# Patient Record
Sex: MALE | Race: WHITE
[De-identification: names, ages, dates, MRNs, and addresses within clinical notes are randomized per-mention and may not be internally consistent; named-entity substitution may affect disease eponyms.]

---

## 2017-03-31 ENCOUNTER — HOSPITAL ENCOUNTER (INPATIENT)
Dept: HOSPITAL 74 - JER | Age: 62
LOS: 7 days | Discharge: NURSING, REHAB OR LONG-TERM CARE | DRG: 312 | End: 2017-04-07
Attending: NURSE PRACTITIONER | Admitting: INTERNAL MEDICINE
Payer: COMMERCIAL

## 2017-03-31 VITALS — BODY MASS INDEX: 27.2 KG/M2

## 2017-03-31 DIAGNOSIS — M48.02: ICD-10-CM

## 2017-03-31 DIAGNOSIS — N17.8: ICD-10-CM

## 2017-03-31 DIAGNOSIS — I12.9: ICD-10-CM

## 2017-03-31 DIAGNOSIS — I95.1: Primary | ICD-10-CM

## 2017-03-31 DIAGNOSIS — N18.9: ICD-10-CM

## 2017-03-31 DIAGNOSIS — Z87.891: ICD-10-CM

## 2017-03-31 DIAGNOSIS — E11.40: ICD-10-CM

## 2017-03-31 DIAGNOSIS — E78.00: ICD-10-CM

## 2017-03-31 DIAGNOSIS — E11.649: ICD-10-CM

## 2017-03-31 DIAGNOSIS — I25.118: ICD-10-CM

## 2017-03-31 DIAGNOSIS — F32.9: ICD-10-CM

## 2017-03-31 DIAGNOSIS — Z79.4: ICD-10-CM

## 2017-03-31 DIAGNOSIS — E11.22: ICD-10-CM

## 2017-03-31 DIAGNOSIS — M21.371: ICD-10-CM

## 2017-03-31 LAB
ALBUMIN SERPL-MCNC: 4 G/DL (ref 3.4–5)
ALP SERPL-CCNC: 89 U/L (ref 45–117)
ALT SERPL-CCNC: 22 U/L (ref 12–78)
ANION GAP SERPL CALC-SCNC: 13 MMOL/L (ref 8–16)
AST SERPL-CCNC: 19 U/L (ref 15–37)
BASOPHILS # BLD: 0.5 % (ref 0–2)
BILIRUB SERPL-MCNC: 0.4 MG/DL (ref 0.2–1)
CALCIUM SERPL-MCNC: 8.5 MG/DL (ref 8.5–10.1)
CO2 SERPL-SCNC: 25 MMOL/L (ref 21–32)
CREAT SERPL-MCNC: 2.9 MG/DL (ref 0.7–1.3)
DEPRECATED RDW RBC AUTO: 13.8 % (ref 11.9–15.9)
EOSINOPHIL # BLD: 2.4 % (ref 0–4.5)
GLUCOSE SERPL-MCNC: 75 MG/DL (ref 74–106)
INR BLD: 0.97 (ref 0.82–1.09)
MCH RBC QN AUTO: 31.4 PG (ref 25.7–33.7)
MCHC RBC AUTO-ENTMCNC: 32.9 G/DL (ref 32–35.9)
MCV RBC: 95.3 FL (ref 80–96)
NEUTROPHILS # BLD: 69.2 % (ref 42.8–82.8)
PLATELET # BLD AUTO: 160 K/MM3 (ref 134–434)
PMV BLD: 10.1 FL (ref 7.5–11.1)
PROT SERPL-MCNC: 8 G/DL (ref 6.4–8.2)
PT PNL PPP: 10.7 SEC (ref 9.98–11.88)
TROPONIN I SERPL-MCNC: 0.18 NG/ML (ref 0–0.05)
TROPONIN I SERPL-MCNC: < 0.02 NG/ML (ref 0–0.05)
WBC # BLD AUTO: 8.5 K/MM3 (ref 4–10)

## 2017-03-31 PROCEDURE — A9502 TC99M TETROFOSMIN: HCPCS

## 2017-03-31 RX ADMIN — INSULIN ASPART SCH UNITS: 100 INJECTION, SOLUTION INTRAVENOUS; SUBCUTANEOUS at 20:59

## 2017-03-31 NOTE — PDOC
History of Present Illness





- General


History Source: Patient, EMS


Exam Limitations: No Limitations





- History of Present Illness


Initial Comments: 





03/31/17 11:03


The patient is a 61 year old male, from [facility], with a significant 

past medical history of Type 2 diabetes(on lantus) HTN, neuropathy, cervical 

spinal stenosis, CKD (RTA4), and depression who presents to the ED, via EMS, s/

p loss of consciousness. The patient reports a blood sugar level of 94 this 

morning and was given lantus. Later the patient reports he was sitting in his 

chair waiting to get breakfast when he had a sudden onset of dizziness and loss 

consciousness. As per EMS, the patient had a blood sugar level of 30 upon their 

arrival to [facility] was urnesponsive and was diaphoretic. EMS gave 

the patient a bag of D10 and the patient became more responsive. Patient 

reports right sided chest pain, tremors and stiffness, that started en route 

to the ED. 


The patient also reports a chronic cough in the evening, but it is not worsened 

than usual. 


Denies changes in vision. Denies neck pain or back pain. Denies shortness of 

breath or palpitations. Denies abdominal pain, nausea, vomiting, or diarrhea. 

Denies fevers or chills. Denies any other symptoms.





<Saud Mallory - Last Filed: 03/31/17 13:28>





<Nathalie Rivera - Last Filed: 03/31/17 21:13>





<Anders Tam - Last Filed: 04/04/17 09:14>





- General


Chief Complaint: Blood Sugar Problem


Stated Complaint: Altered Mental Status


Time Seen by Provider: 03/31/17 10:38





Past History





<Saud Mallory - Last Filed: 03/31/17 13:28>





<Nathalie Rivera - Last Filed: 03/31/17 21:13>





- Past Medical History


Cardiac Disorders: Yes (CAD)


Diabetes: Yes


HTN: Yes


Other medical history: renal tubular acidosis





- Psycho/Social/Smoking Cessation Hx


Suicidal Ideation: No


Smoking History: Unknown if ever smoked


Hx Alcohol Use: Yes (QUIT IN 2006)


Substance Use Type: Alcohol


Hx Substance Use Treatment: No





<Anders Tam - Last Filed: 04/04/17 09:14>





- Past Medical History


Allergies/Adverse Reactions: 


 Allergies











Allergy/AdvReac Type Severity Reaction Status Date / Time


 


No Known Drug Allergies Allergy   Verified 11/16/16 09:26











Home Medications: 


Ambulatory Orders





Amlodipine Besylate [Norvasc -] 10 mg PO DAILY 11/15/16 


Calcitriol [Calcitriol -] 0.25 mcg PO DAILY 11/15/16 


Clonidine HCl 0.1 mg PO TID 11/15/16 


Furosemide [Lasix -] 40 mg PO DAILY 11/15/16 


Gabapentin [Neurontin] 300 mg PO BID 11/15/16 


Hypromellose 0.5% Opth Soln [Artificial Tears] 1 drop OU PRN PRN 11/15/16 


Insulin Glargine,Hum.rec.anlog [Lantus Solostar PEN -] 36 units SQ DAILY 11/15/

16 


Latanoprost 0.005% Eye Drops [Xalatan 0.005% Eye Drops -] 1 drop OU HS 11/15/16 


Meclizine HCl [Antivert -] 12.5 mg PO PRN PRN 11/15/16 


Multivitamins [Multivit ([facility] Formulary)] 1 tab PO DAILY 11/15/16 


Pantoprazole Sodium [Protonix] 40 mg PO DAILY 11/15/16 


Acetaminophen [Tylenol Extra Strength] 500 mg PO PRN PRN #0  11/16/16 


Ammonium Lactate Cream [Lac-Hydrin 12% Cream -] 0.01 unit TP DAILY 03/31/17 


Bacitracin - [Bacitracin Topical Ointment -] 1 applic TP DAILY 03/31/17 


Duloxetine HCl [Cymbalta] 30 mg PO DAILY 03/31/17 


Hydrocodone/Acetaminophen [Hydrocodon-Acetaminoph 7.5-325] 0 each PO Q6H MDD 4 

03/31/17 


Lactulose [Cephulac -] 30 ml PO DAILY 03/31/17 


Linagliptin [Tradjenta] 5 mg PO DAILY 03/31/17 


Sodium Polystyrene Sulfonate 15 gm PO DAILY 03/31/17 


Synalar Ts 0.01% Kit 0.01 drop TP DAILY 03/31/17 











**Review of Systems





- Review of Systems


Able to Perform ROS?: Yes


Comments:: 





03/31/17 11:03


CONSTITUTIONAL:


No reported: Fever, Chills, Diaphoresis, Generalized Weakness, Malaise, Loss of 

Appetite


HEENT:


No reported: Rhinorrhea, Nasal Congestion, Throat Pain, Throat Swelling, 

Difficulty Swallowing, Mouth Swelling, Ear Pain, Eye Pain, Visual Changes


CARDIOVASCULAR: + chest pain 


No reported: Palpitations, Irregular Heart Rate, Lightheadedness, Peripheral 

Edema


RESPIRATORY: +chronic evening cough


No reported:  Shortness of Breath, SOB with Exertion, Orthopnea, Wheezing, 

Stridor, Hemoptysis


GASTROINTESTINAL:


No reported: Abdominal pain, Abdominal Distension, Nausea, Vomiting, Diarrhea, 

Constipation, Melena, Hematochezia


GENITOURINARY:


No reported: Dysuria, Frequency, Urgency, Hesitancy, Flank Pain, Genital Pain


MUSCULOSKELETAL:


No reported: Myalgia, Arthralgia, Joint Swelling, Back pain, Neck Pain


SKIN:


No reported: Rash, Itching, Pallor


HEMEATOLOGIC/IMMUNOLOGIC:


No reported: Easy Bleeding, Easy Bruising, Lymphadenopathy, Frequent infections


ENDOCRINE:


No reported: Unexplained Weight Gain, Unexplained Weight Loss, Heat Intolerance

, Cold Intolerance


NEUROLOGIC: + loss of consciousness, tremors 


No reported: Headache, Focal Weakness, Paresthesias, Vertigo, Lightheadedness, 

Unsteady Gait, Seizure, Mental Status Changes, Incontinence


PSYCHIATRIC:


No reported: Anxiety, Depression  





All Other Systems: Reviewed and Negative





<Saud Mallory - Last Filed: 03/31/17 13:28>





*Physical Exam





- Vital Signs


 Last Vital Signs











Temp Pulse Resp BP Pulse Ox


 


 98.4 F   80   18   165/80   100 


 


 03/31/17 10:33  03/31/17 10:33  03/31/17 10:33  03/31/17 10:33  03/31/17 10:33














- Physical Exam


Comments: 





03/31/17 11:04


GENERAL: The patient is awake, alert, and fully oriented, Nontoxic - in no 

acute distress.


HEAD: Normocephalic, atraumatic.


EYES: extraocular movements intact, sclera anicteric, conjunctiva clear, pupils 

2mm bilaterally.


ENT: Normal voice,  Moist mucous membranes.


NECK: Normal range of motion, supple


LUNGS: Breath sounds equal, clear to auscultation bilaterally.  No wheezes, no 

rhonchi, no rales.


HEART: Regular rate and rhythm, normal S1 and S2 without murmur, rub or gallop.


ABDOMEN: Soft, nontender, normoactive bowel sounds.  No guarding, no rebound.  

. No CVA tenderness


EXTREMITIES: atrophy of intrinsic hand muscles R>L, R foot drop (brace in place)

,


NEUROLOGICAL: No facial assymetry, Normal speech, tremulous, 


PSYCH: Normal mood, normal affect.


SKIN: Warm, Dry, normal turgor,





<Saud Mallory - Last Filed: 03/31/17 13:28>





- Vital Signs


 Last Vital Signs











Temp Pulse Resp BP Pulse Ox


 


 97.7 F   73   19   163/89   97 


 


 03/31/17 19:49  03/31/17 19:49  03/31/17 19:49  03/31/17 19:49  03/31/17 19:49














<Nathalie Rivera - Last Filed: 03/31/17 21:13>





- Vital Signs


 Last Vital Signs











Temp Pulse Resp BP Pulse Ox


 


 98.4 F   80   18   165/80   100 


 


 03/31/17 10:33  03/31/17 10:33  03/31/17 10:33  03/31/17 10:33  03/31/17 10:33














<Anders Tam - Last Filed: 04/04/17 09:14>





**Heart Score/ECG Review





- ECG Impressions


Comment:: 


03/31/17 11:04


Twelve-lead EKG was performed and reviewed by me. 


There is normal sinus rhythm with a normal rate. 


Rate of 75


The axis is normal. 


Q waves in lead 3


T wave inversion in aVL











03/31/17 16:49


Twelve-lead EKG was performed and reviewed by me. 


There is normal sinus rhythm with a normal rate. 


Rate of 75


The axis is normal. 


T wave inversion in aVL


No significant changes when compared with prior EKG





<Anders Tam - Last Filed: 04/04/17 09:14>





ED Treatment Course





- LABORATORY


CBC & Chemistry Diagram: 


 03/31/17 10:43





 03/31/17 10:43





- ADDITIONAL ORDERS


Additional order review: 


 











  03/31/17





  10:43


 


RBC  4.66


 


MCV  95.3


 


MCHC  32.9


 


RDW  13.8


 


MPV  10.1


 


Neutrophils %  69.2


 


Lymphocytes %  20.7


 


Monocytes %  7.2


 


Eosinophils %  2.4


 


Basophils %  0.5














- RADIOLOGY


Radiograph Interpretation: 





03/31/17 11:09


RAD/CHEST X-RAY PORTABLE


Impression: No evidence of active pulmonary disease.


Reported by: Martín Clifford 





<Saud Mallory - Last Filed: 03/31/17 13:28>





- LABORATORY


CBC & Chemistry Diagram: 


 03/31/17 10:43





 03/31/17 10:43





- ADDITIONAL ORDERS


Additional order review: 


 Laboratory  Results











  03/31/17 03/31/17 03/31/17





  20:21 17:00 12:19


 


INR   


 


Sodium   


 


Potassium   


 


Chloride   


 


Carbon Dioxide   


 


Anion Gap   


 


BUN   


 


Creatinine   


 


Creat Clearance w eGFR   


 


POC Glucometer  [phone]   235.03701


 


Random Glucose   


 


Calcium   


 


Magnesium   


 


Total Bilirubin   


 


AST   


 


ALT   


 


Alkaline Phosphatase   


 


Creatine Kinase   140 


 


Creatine Kinase Index   


 


CK-MB (CK-2)   


 


CK-MB (CK-2) Rel Index   


 


Troponin I   0.18 H D 


 


Total Protein   


 


Albumin   














  03/31/17 03/31/17 03/31/17





  10:43 10:43 10:43


 


INR    0.97


 


Sodium   


 


Potassium   


 


Chloride   


 


Carbon Dioxide   


 


Anion Gap   


 


BUN   


 


Creatinine   


 


Creat Clearance w eGFR   


 


POC Glucometer   


 


Random Glucose   


 


Calcium   


 


Magnesium   2.2 


 


Total Bilirubin   


 


AST   


 


ALT   


 


Alkaline Phosphatase   


 


Creatine Kinase   


 


Creatine Kinase Index   


 


CK-MB (CK-2)   


 


CK-MB (CK-2) Rel Index  Cancelled  


 


Troponin I   


 


Total Protein   


 


Albumin   














  03/31/17





  10:43


 


INR 


 


Sodium  141


 


Potassium  3.4 L


 


Chloride  103


 


Carbon Dioxide  25


 


Anion Gap  13


 


BUN  31 H


 


Creatinine  2.9 H


 


Creat Clearance w eGFR  22.31


 


POC Glucometer 


 


Random Glucose  75


 


Calcium  8.5


 


Magnesium 


 


Total Bilirubin  0.4


 


AST  19


 


ALT  22


 


Alkaline Phosphatase  89


 


Creatine Kinase  151


 


Creatine Kinase Index  1.4


 


CK-MB (CK-2)  2.264


 


CK-MB (CK-2) Rel Index 


 


Troponin I  < 0.02


 


Total Protein  8.0


 


Albumin  4.0








 











  03/31/17 03/31/17 03/31/17





  20:21 12:19 10:43


 


RBC    4.66


 


MCV    95.3


 


MCHC    32.9


 


RDW    13.8


 


MPV    10.1


 


Neutrophils %    69.2


 


Lymphocytes %    20.7


 


Monocytes %    7.2


 


Eosinophils %    2.4


 


Basophils %    0.5


 


POC Glucometer  350.06893  235.30007 














- Medications


Given in the ED: 


ED Medications














Discontinued Medications














Generic Name Dose Route Start Last Admin





  Trade Name Freq  PRN Reason Stop Dose Admin


 


Aspirin  162 mg 03/31/17 18:14 03/31/17 18:27





  Asa -  PO 03/31/17 18:15  162 mg





  ONCE ONE   Administration














<Nathalie Rivera - Last Filed: 03/31/17 21:13>





- LABORATORY


CBC & Chemistry Diagram: 


 04/02/17 06:02





 04/04/17 05:35





- RADIOLOGY


Radiology Studies Ordered: 














 Category Date Time Status


 


 CHEST X-RAY PORTABLE* [RAD] Stat Radiology  03/31/17 10:44 Ordered














<Anders Tam - Last Filed: 04/04/17 09:14>





Medical Decision Making





- Medical Decision Making





03/31/17 13:28


Case discussed with Leda Shane at 12:40.





<Saud Mallory - Last Filed: 03/31/17 13:28>





- Medical Decision Making


03/31/17 10:47


61y M hx of spinal stenosis, dm2, depression, glaucoma, RTA4, presents with 

episode of nonresponsiveness. Per EMS and patient report, the patient was found 

unresponsive this morning, upon EMS arrival, his BGM was 30 and he was 

diaphoretic and responded to D10. The patient was noted to be mildly hypoxic to 

93% on RA and he does complain of a chornic night cough but isnt really worse 

than usual, the pt did have some R sided chest pain that started enrout to the 

hospital.  On exam the pt is alert/oriented, had some chronic atrophy of his 

intrinsic hand muscles. Exam otherwise unremarakble.





differential for the patients ams - likely hypoglycemia, unclear trigger -?no 

infectious complaints - but will send a UA, will ck trops, ekg, labs, cxr


will monitor bgm














03/31/17 12:43


The pts blood work is notable for cr of 2.9 - 


ua/tox pending


pts bgm is holding up





case dw dr. Burns (PMD) states pt has ckd and his cr is in the 2-3 range


will obtain 2nd trop, and repeat bgm - if normal, will d/c without patient 

management











03/31/17 18:13


second 2kg unchanged


trop trending higher, at 0.18


will admit to obs for further management


currently cp free





<Anders Tam - Last Filed: 04/04/17 09:14>





*DC/Admit/Observation/Transfer





- Attestations


Scribe Attestion: 





03/31/17 11:04





Documentation prepared by Saud aMllory, acting as medical scribe for Anders Tam MD





<Saud Mallory - Last Filed: 03/31/17 13:28>





- Discharge Dispostion


Admit: Yes





<Nathalie Rivera - Last Filed: 03/31/17 21:13>





<Anders Tam - Last Filed: 04/04/17 09:14>


Diagnosis at time of Disposition: 


 Chest pain, CKD (chronic kidney disease)





Diabetes mellitus


Qualifiers:


 Diabetes mellitus type: type 2 Diabetes mellitus complication status: without 

complication Diabetes mellitus long term insulin use: without long term use 

Qualified Code(s): E11.9 - Type 2 diabetes mellitus without complications





- Referrals

## 2017-04-01 LAB
ANION GAP SERPL CALC-SCNC: 11 MMOL/L (ref 8–16)
APPEARANCE UR: CLEAR
BASOPHILS # BLD: 0.4 % (ref 0–2)
BILIRUB UR STRIP.AUTO-MCNC: NEGATIVE MG/DL
CALCIUM SERPL-MCNC: 8.3 MG/DL (ref 8.5–10.1)
CHOLEST SERPL-MCNC: 127 MG/DL (ref 50–200)
CO2 SERPL-SCNC: 23 MMOL/L (ref 21–32)
COLOR UR: (no result)
CREAT SERPL-MCNC: 2.4 MG/DL (ref 0.7–1.3)
DEPRECATED RDW RBC AUTO: 13.7 % (ref 11.9–15.9)
EOSINOPHIL # BLD: 3 % (ref 0–4.5)
GLUCOSE SERPL-MCNC: 149 MG/DL (ref 74–106)
KETONES UR QL STRIP: NEGATIVE
LDLC SERPL CALC-MCNC: 69 MG/DL (ref 5–100)
LEUKOCYTE ESTERASE UR QL STRIP.AUTO: NEGATIVE
MAGNESIUM SERPL-MCNC: 2 MG/DL (ref 1.8–2.4)
MCH RBC QN AUTO: 32 PG (ref 25.7–33.7)
MCHC RBC AUTO-ENTMCNC: 33.6 G/DL (ref 32–35.9)
MCV RBC: 95.3 FL (ref 80–96)
NEUTROPHILS # BLD: 69.3 % (ref 42.8–82.8)
NITRITE UR QL STRIP: NEGATIVE
PH UR: 6 [PH] (ref 5–8)
PHOSPHATE SERPL-MCNC: 2.5 MG/DL (ref 2.5–4.9)
PLATELET # BLD AUTO: 147 K/MM3 (ref 134–434)
PMV BLD: 10.1 FL (ref 7.5–11.1)
PROT UR QL STRIP: (no result)
PROT UR QL STRIP: NEGATIVE
RBC # UR STRIP: NEGATIVE /UL
SP GR UR: 1 (ref 1–1.03)
TROPONIN I SERPL-MCNC: 0.13 NG/ML (ref 0–0.05)
UROBILINOGEN UR STRIP-MCNC: NEGATIVE E.U./DL (ref 0.2–1)
WBC # BLD AUTO: 7.8 K/MM3 (ref 4–10)

## 2017-04-01 RX ADMIN — PANTOPRAZOLE SODIUM SCH MG: 40 TABLET, DELAYED RELEASE ORAL at 09:03

## 2017-04-01 RX ADMIN — INSULIN ASPART SCH UNITS: 100 INJECTION, SOLUTION INTRAVENOUS; SUBCUTANEOUS at 11:28

## 2017-04-01 RX ADMIN — INSULIN ASPART SCH UNITS: 100 INJECTION, SOLUTION INTRAVENOUS; SUBCUTANEOUS at 16:32

## 2017-04-01 RX ADMIN — INSULIN ASPART SCH: 100 INJECTION, SOLUTION INTRAVENOUS; SUBCUTANEOUS at 06:32

## 2017-04-01 RX ADMIN — AMLODIPINE BESYLATE SCH MG: 10 TABLET ORAL at 09:04

## 2017-04-01 RX ADMIN — LATANOPROST SCH DRP: 50 SOLUTION OPHTHALMIC at 22:11

## 2017-04-01 RX ADMIN — CALCITRIOL SCH MCG: 0.25 CAPSULE ORAL at 09:05

## 2017-04-01 RX ADMIN — CARVEDILOL SCH MG: 6.25 TABLET, FILM COATED ORAL at 11:30

## 2017-04-01 RX ADMIN — CARVEDILOL SCH MG: 6.25 TABLET, FILM COATED ORAL at 22:11

## 2017-04-01 RX ADMIN — HEPARIN SODIUM SCH UNIT: 5000 INJECTION, SOLUTION INTRAVENOUS; SUBCUTANEOUS at 22:11

## 2017-04-01 RX ADMIN — GABAPENTIN SCH MG: 300 CAPSULE ORAL at 22:11

## 2017-04-01 RX ADMIN — INSULIN ASPART SCH UNITS: 100 INJECTION, SOLUTION INTRAVENOUS; SUBCUTANEOUS at 22:11

## 2017-04-01 RX ADMIN — DULOXETINE SCH MG: 30 CAPSULE, DELAYED RELEASE ORAL at 09:04

## 2017-04-01 RX ADMIN — FUROSEMIDE SCH MG: 40 TABLET ORAL at 09:04

## 2017-04-01 RX ADMIN — HEPARIN SODIUM SCH UNIT: 5000 INJECTION, SOLUTION INTRAVENOUS; SUBCUTANEOUS at 09:04

## 2017-04-01 RX ADMIN — ASPIRIN 81 MG SCH MG: 81 TABLET ORAL at 09:04

## 2017-04-01 RX ADMIN — BACITRACIN ZINC SCH APPLIC: 500 OINTMENT TOPICAL at 14:20

## 2017-04-01 RX ADMIN — GABAPENTIN SCH MG: 300 CAPSULE ORAL at 09:03

## 2017-04-01 RX ADMIN — MULTIVITAMIN TABLET SCH TAB: TABLET at 09:02

## 2017-04-01 NOTE — CONS
DATE OF CONSULTATION:  04/01/2017

 

Consultation requested by hospitalist.

 

CHIEF COMPLAINT:  Evaluation of chest discomfort, elevated troponin I.

 

History was obtained from the patient.  A 61-year-old male of  descent, with

known history of probable diastolic left ventricular dysfunction, with class 0 [place]

[place] Heart Association classification left ventricular failure, hypertensive

cardiovascular disease, insulin-dependent diabetes mellitus, hypercholesterolemia,

cervical spinal stenosis, chronic kidney disease, peripheral neuropathy, right foot

drop, who presented to [facility] with unresponsiveness, and he

was noted to have hypoglycemia.  The patient in addition was noted to have elevated

troponin I.  The patient has been complaining of left-sided chest wall discomfort,

which has been noted for the last several days, exacerbated by cough or deep

inspiration.  The patient denied any dyspnea, orthopnea, paroxysmal nocturnal

dyspnea, or peripheral edema.  The patient denied any palpitations, dizziness,

lightheadedness.  The patient reports fatigue and tiredness.

 

PAST MEDICAL HISTORY:  Probable diastolic left ventricular dysfunction, with class 0

[place] Heart Association classification left ventricular failure; hypertensive

cardiovascular disease; diabetes mellitus; hypercholesterolemia; cervical spinal

stenosis; chronic kidney disease; peripheral neuropathy.

 

PAST SURGICAL HISTORY:  Right heel surgery for management of an abscess.

 

SOCIAL HISTORY:  Prior history of tobacco abuse.

 

FAMILY HISTORY:  Positive coronary artery disease.

 

ALLERGIES:  No known medical allergies.

 

MEDICAL THERAPY:  At home included Norvasc 10 mg once a day, calcitriol of 0.25 mcg

once a day, clonidine 0.1 mg 3 times a day, Lasix 40 mg once a day, Neurontin 300 mg

twice a day, insulin as prescribed, meclizine 12.5-mg tablet as needed for benign

positional vertigo, multivitamin 1 tablet once a day, Protonix 40 mg once a day,

Cymbalta 30 mg once a day, hydrocodone/acetaminophen 7.5/325 mg-tablet 1 tablet every

4 hours as needed, Tradjenta 5 mg once a day.

 

REVIEW OF SYSTEMS:

Head and neck:  Denies headache, photophobia, blurring of vision.

Respiratory:  No cough or sputum production.

Cardiovascular:  As noted above.

Gastrointestinal:  Denies nausea, vomiting, diarrhea, abdominal discomfort.

Genitourinary:  No symptoms reported.

Musculoskeletal:  Chest wall discomfort, as noted above.

 

PHYSICAL EXAMINATION:

Vital signs:  Blood pressure is 144/68 mmHg; pulse rate is 70 beats per minute.

Head and neck:  Pupils equal and react to light and accommodation.  Extraocular

muscles are intact.  Anicteric sclerae.  Negative JVD.  No bruit appreciated.

Chest:  Clear to auscultation and percussion.

Cardiovascular:  S1, S2 regular.  No murmur, clicks, or gallops.

Abdomen:  Soft.  Benign.  Normoactive bowel sounds.

Extremities:  Diminished distal pulses.  Bilateral femoral pulses 1+ to 2+.  No calf

tenderness.

 

Electrocardiogram reveals sinus rhythm, with nonspecific T-wave abnormality, and

nondiagnostic inferior Q wave.

 

CBC reveals a white cell count of 7.8, hemoglobin 13.5, platelet count 147.  Basic

metabolic profile reveals sodium 141, potassium 3.4, BUN of 31, creatinine 2.9,

glucose 75.  Troponin initially less than 0.02; repeat 0.18, 0.21 and 0.13.

 

ASSESSMENT:

1.  Chest pain syndrome, coronary artery disease, angina pectoris, with borderline

troponin I elevation.  Not indicative of acute coronary syndrome, clinically, since

there are no electrocardiographic changes, and the above-noted presentation is

atypical.

2.  Diastolic left ventricular dysfunction, with class 0 to 1 [place] Heart

Association classification left ventricular failure.

3.  Insulin-dependent diabetes mellitus.

4.  Hypertensive cardiovascular disease.

5.  Hypercholesterolemia.

6.  Cervical spinal stenosis.

7.  Chronic kidney disease.

8.  History of peripheral neuropathy.

 

RECOMMENDATIONS:

1.  Addition of beta-blockers, Coreg.

2.  Continuation of Norvasc therapy.

3.  Ideally, the patient should be on an ACE inhibitor or angiotensin-receptor

blocker therapy, unless it is absolutely contraindicated, pending renal function

stabilization.

4.  Continuation of clonidine therapy.

5.  Continuation of Lasix therapy.

6.  Continuation of aspirin therapy.

7.  Echocardiography for evaluation of left ventricular size and function.

8.  Pharmacologic myocardial perfusion imaging study for evaluation of coronary

artery disease ischemic defect severity.

 

Thank you for the kind referral.

 

 

CONNIE DOMINGUEZ M.D.

 

[id number]

DD: 04/01/2017 11:04

DT: 04/01/2017 13:57

Job #:  [id number]

## 2017-04-01 NOTE — EKG
Test Reason : 

Blood Pressure : ***/*** mmHG

Vent. Rate : 066 BPM     Atrial Rate : 066 BPM

   P-R Int : 176 ms          QRS Dur : 086 ms

    QT Int : 414 ms       P-R-T Axes : 036 027 098 degrees

   QTc Int : 434 ms

 

NORMAL SINUS RHYTHM

POSSIBLE INFERIOR INFARCT , AGE UNDETERMINED

ABNORMAL ECG

WHEN COMPARED WITH ECG OF 31-MAR-2017 16:41,

BORDERLINE CRITERIA FOR INFERIOR INFARCT ARE NOW PRESENT

Confirmed by SABRINA FARNSWORTH MD ([id number]) on 4/1/2017 12:14:05 PM

 

Referred By:             Confirmed By:SABRINA FARNSWORTH MD

## 2017-04-01 NOTE — EKG
Test Reason : 

Blood Pressure : ***/*** mmHG

Vent. Rate : 075 BPM     Atrial Rate : 075 BPM

   P-R Int : 174 ms          QRS Dur : 086 ms

    QT Int : 394 ms       P-R-T Axes : 040 -06 091 degrees

   QTc Int : 439 ms

 

NORMAL SINUS RHYTHM WITH SINUS ARRHYTHMIA

ABNORMAL QRS-T ANGLE, CONSIDER PRIMARY T WAVE ABNORMALITY

ABNORMAL ECG

WHEN COMPARED WITH ECG OF 31-MAR-2017 10:50,

CRITERIA FOR INFERIOR INFARCT ARE NO LONGER PRESENT

Confirmed by SABRINA FARNSWORTH MD ([id number]) on 4/1/2017 12:20:46 PM

 

Referred By:             Confirmed By:SABRINA FARNSWORTH MD

## 2017-04-01 NOTE — PN
Progress Note (short form)





- Note


Progress Note: 


Chief Complaint: Events noted, notes reviewed, Complaining of left sided chest 

pain, intermittent, denies any dyspnea 





History of Present Illness: 


Seen and examined on telemetry. Full consult 





- Current Medication List





 Current Medications





Acetaminophen (Tylenol -)  500 mg PO Q6H PRN


   PRN Reason: PAIN


Amlodipine Besylate (Norvasc -)  10 mg PO DAILY [facility]


   Last Admin: 04/01/17 09:04 Dose:  10 mg


Aspirin (Asa -)  81 mg PO DAILY [facility]


   Last Admin: 04/01/17 09:04 Dose:  81 mg


Bacitracin (Bacitracin -)  1 applic TP DAILY [facility]


Calcitriol (Rocaltrol -)  0.25 mcg PO DAILY [facility]


   Last Admin: 04/01/17 09:05 Dose:  0.25 mcg


Clonidine (Catapres -)  0.1 mg PO TID [facility]


   Last Admin: 04/01/17 06:09 Dose:  0.1 mg


Duloxetine HCl (Cymbalta -)  30 mg PO DAILY [facility]


   Last Admin: 04/01/17 09:04 Dose:  30 mg


Furosemide (Lasix -)  40 mg PO DAILY [facility]


   Last Admin: 04/01/17 09:04 Dose:  40 mg


Gabapentin (Neurontin -)  300 mg PO BID [facility]


   Last Admin: 04/01/17 09:03 Dose:  300 mg


Heparin Sodium (Porcine) (Heparin -)  5,000 unit SQ BID [facility]


   Last Admin: 04/01/17 09:04 Dose:  5,000 unit


Insulin Aspart (Novolog Vial Sliding Scale -)  0 vial SQ ACHS [facility]


   PRN Reason: Protocol


   Last Admin: 04/01/17 06:32 Dose:  Not Given


Latanoprost (Xalatan 0.005% Eye Drops -)  1 drop OU HS [facility]


Multivitamins/Minerals/Vitamin C (Tab-A-Vit -)  1 tab PO DAILY [facility]


   Last Admin: 04/01/17 09:02 Dose:  1 tab


Pantoprazole Sodium (Protonix -)  40 mg PO DAILY [facility]


   Last Admin: 04/01/17 09:03 Dose:  40 mg





- Review of Systems


Cardiovascular: As noted above


Respiratory: denies: Cough or Sputum Production


Gastrointestinal: denies: Nausea, Vomiting, Diarrhea, Constipation or Abdominal 

Pain  


Musculoskeletal:  No symptoms reported 


Neurological: Foot drop and spinal stenosis 





- Objective


Vital Signs: 





 Last Vital Signs











Temp Pulse Resp BP Pulse Ox


 


 98 F   70   16   144/68   98 


 


 04/01/17 09:30  04/01/17 09:30  04/01/17 09:30  04/01/17 09:30  03/31/17 23:35








Neck: Supple Negative JVD 


Cardiovascular: S1 S2 Regular Rate Rhythm 


Respiratory: Clear to A&P


Gastrointestinal: Soft Benign Normal Bowel Sounds


Ext: Negative Edema





Labs: 





 CBC, BMP





 04/01/17 06:07 





 03/31/17 10:43 





 Hepatic Panel











Total Bilirubin  0.4 mg/dL (0.2-1.0)   03/31/17  10:43    


 


AST  19 U/L (15-37)   03/31/17  10:43    


 


ALT  22 U/L (12-78)   03/31/17  10:43    


 


Alkaline Phosphatase  89 U/L ()   03/31/17  10:43    


 


Albumin  4.0 g/dl (3.4-5.0)   03/31/17  10:43    








 Troponin, BNP











  03/31/17 03/31/17 04/01/17





  10:43 17:00 00:01


 


Troponin I  < 0.02  0.18 H D  0.21 H














  04/01/17 04/01/17





  06:07 06:07


 


Troponin I  0.13 H D  Cancelled











Assessment/Plan





ASSESSMENT:





1. Chest pain syndrome, CAD angina pectoris with borderline Troponin I 

elevation not indicative of ACS clinically, no EKG acute  changes


2. Diastolic LV dysfunction with class 0-I NYHA classification LV failure


3. IDDM


4. HTN


5. Hypercholesterolemia


6. Cervical Spinal Stenosis


7. CKD 


8. Neuropathy 





PLAN:





1. Add B-Blockers, Coreg


2. Continue Norvasc and Clonidine


3. Ideally should be on ACEI or ARBS unless contraindicated, pending renal 

function stabilization


4. Continue PO Lasix


5. Continue ASA


6. Echocardiography for evaluation of LV function


7. Pharmacologic MPI study to evaluate CAD/ischemia severity 














Debibe Bartholomew MD

## 2017-04-01 NOTE — PN
Progress Note (short form)





- Note


Progress Note: 


Subjective: The patient was seen at the bedside, he states he doesn't want to 

talk and asked me to leave the room. 





 Current Medications











Generic Name Dose Route Start Last Admin





  Trade Name Nixon  PRN Reason Stop Dose Admin


 


Acetaminophen  500 mg 04/01/17 00:50  





  Tylenol -  PO   





  Q6H PRN   





  PAIN   


 


Amlodipine Besylate  10 mg 04/01/17 10:00 04/01/17 09:04





  Norvasc -  PO   10 mg





  DAILY VICKIE   Administration


 


Aspirin  81 mg 04/01/17 10:00 04/01/17 09:04





  Asa -  PO   81 mg





  DAILY VICKIE   Administration


 


Bacitracin  1 applic 04/01/17 10:00  





  Bacitracin -  TP   





  DAILY VICKIE   


 


Calcitriol  0.25 mcg 04/01/17 10:00 04/01/17 09:05





  Rocaltrol -  PO   0.25 mcg





  DAILY VICKIE   Administration


 


Carvedilol  6.25 mg 04/01/17 11:15 04/01/17 11:30





  Coreg -  PO   6.25 mg





  BID VICKIE   Administration


 


Clonidine  0.1 mg 04/01/17 22:00  





  Catapres -  PO   





  BID VICKIE   


 


Duloxetine HCl  30 mg 04/01/17 10:00 04/01/17 09:04





  Cymbalta -  PO   30 mg





  DAILY VICKIE   Administration


 


Furosemide  40 mg 04/01/17 10:00 04/01/17 09:04





  Lasix -  PO   40 mg





  DAILY VICKIE   Administration


 


Gabapentin  300 mg 04/01/17 10:00 04/01/17 09:03





  Neurontin -  PO   300 mg





  BID VICKIE   Administration


 


Heparin Sodium (Porcine)  5,000 unit 04/01/17 10:00 04/01/17 09:04





  Heparin -  SQ   5,000 unit





  BID VICKIE   Administration


 


Insulin Aspart  0 vial 03/31/17 22:00 04/01/17 11:28





  Novolog Vial Sliding Scale -  SQ   4 units





  [facility] VICKIE   Administration





  Protocol   


 


Latanoprost  1 drop 04/01/17 22:00  





  Xalatan 0.005% Eye Drops -  OU   





  HS VICKIE   


 


Multivitamins/Minerals/Vitamin C  1 tab 04/01/17 10:00 04/01/17 09:02





  Tab-A-Vit -  PO   1 tab





  DAILY VICKIE   Administration


 


Pantoprazole Sodium  40 mg 04/01/17 10:00 04/01/17 09:03





  Protonix -  PO   40 mg





  DAILY VICKIE   Administration








Objective: 


 Vital Signs











 Period  Temp  Pulse  Resp  BP Sys/Bellamy  Pulse Ox


 


 Last 24 Hr  97.7 F-98.3 F  70-78  16-19  137-163/62-89  96-98








Physical Exam: 


Patient refused





 CBCD











WBC  7.8 K/mm3 (4.0-10.0)   04/01/17  06:07    


 


RBC  4.21 M/mm3 (4.00-5.60)   04/01/17  06:07    


 


Hgb  13.5 GM/dL (11.7-16.9)   04/01/17  06:07    


 


Hct  40.1 % (35.4-49)   04/01/17  06:07    


 


MCV  95.3 fl (80-96)   04/01/17  06:07    


 


MCHC  33.6 g/dl (32.0-35.9)   04/01/17  06:07    


 


RDW  13.7 % (11.9-15.9)   04/01/17  06:07    


 


Plt Count  147 K/MM3 (134-434)   04/01/17  06:07    


 


MPV  10.1 fl (7.5-11.1)   04/01/17  06:07    








 CMP











Sodium  143 mmol/L (136-145)   04/01/17  06:07    


 


Potassium  4.2 mmol/L (3.5-5.1)  D 04/01/17  06:07    


 


Chloride  109 mmol/L ()  H  04/01/17  06:07    


 


Carbon Dioxide  23 mmol/L (21-32)   04/01/17  06:07    


 


Anion Gap  11  (8-16)   04/01/17  06:07    


 


BUN  28 mg/dL (7-18)  H  04/01/17  06:07    


 


Creatinine  2.4 mg/dL (0.7-1.3)  H  04/01/17  06:07    


 


Creat Clearance w eGFR  22.31  (>60)   03/31/17  10:43    


 


Random Glucose  149 mg/dL ()  H D 04/01/17  06:07    


 


Calcium  8.3 mg/dL (8.5-10.1)  L  04/01/17  06:07    


 


Total Bilirubin  0.4 mg/dL (0.2-1.0)   03/31/17  10:43    


 


AST  19 U/L (15-37)   03/31/17  10:43    


 


ALT  22 U/L (12-78)   03/31/17  10:43    


 


Alkaline Phosphatase  89 U/L ()   03/31/17  10:43    


 


Total Protein  8.0 g/dl (6.4-8.2)   03/31/17  10:43    


 


Albumin  4.0 g/dl (3.4-5.0)   03/31/17  10:43    








 CARDIAC ENZYMES











Creatine Kinase  156 IU/L ()   04/01/17  06:07    


 


Troponin I  0.13 ng/ml (0.00-0.05)  H D 04/01/17  06:07    








Assessment: This is a 61 year old male with PMHx of IDDM, HTN, CKD (RTA4), 

cervical spinal stenosis, neuropathy, R- Foot Drop, atrophy, depression who 

presented to the ED after being found unresponsive with a BGM of 30 (responded 

to D10). 





Plan:


1) Cardiology: Chest pain


- Trops elevated, however trending down. Per cardiology, not indicative of ACS 

clinically. No EKG acute changes


- F/u ECHO


- F/u pharmacologic MPI


Diastolic LV dysfunction


- Continue Carvedilol


- Continue Lasix


- Continue Norvasc


- Continue ASA


- Appreciate cardiology consult


HTN


- As above





2) : CKD


- Baseline Cr 2.5-2.6


- Cr today 2.4


- Continue to monitor





3) Endocrine: IDDM


- Period of hypoglycemia requiring D10 prior to ED arrival


- BGM ACHS


- ISS ACHS


- Patient takes Glargine 36u sq daily at home, will hold for now and monitor 

novolog requirements





4) F/E/N:


- Monitor electrolytes


- Sodium controlled diet





5) Prophylaxis: 


- OOB ambulating


- Heparin 5,000u sq bid





6) Dispo:


- Requires continued inpatient care





CODE STATUS: FULL CODE





Visit type





- Emergency Visit


Emergency Visit: Yes


ED Registration Date: 03/31/17


Care time: The patient presented to the Emergency Department on the above date 

and was hospitalized for further evaluation of their emergent condition.





- New Patient


This patient is new to me today: Yes


Date on this admission: 04/01/17





- Critical Care


Critical Care patient: No

## 2017-04-02 LAB
ALBUMIN SERPL-MCNC: 3.5 G/DL (ref 3.4–5)
ALP SERPL-CCNC: 80 U/L (ref 45–117)
ALT SERPL-CCNC: 19 U/L (ref 12–78)
ANION GAP SERPL CALC-SCNC: 9 MMOL/L (ref 8–16)
AST SERPL-CCNC: 17 U/L (ref 15–37)
BASOPHILS # BLD: 0.4 % (ref 0–2)
BILIRUB SERPL-MCNC: 0.6 MG/DL (ref 0.2–1)
CALCIUM SERPL-MCNC: 8.7 MG/DL (ref 8.5–10.1)
CO2 SERPL-SCNC: 28 MMOL/L (ref 21–32)
CREAT SERPL-MCNC: 2.5 MG/DL (ref 0.7–1.3)
DEPRECATED RDW RBC AUTO: 13.7 % (ref 11.9–15.9)
EOSINOPHIL # BLD: 5.7 % (ref 0–4.5)
GLUCOSE SERPL-MCNC: 215 MG/DL (ref 74–106)
MCH RBC QN AUTO: 31.6 PG (ref 25.7–33.7)
MCHC RBC AUTO-ENTMCNC: 33 G/DL (ref 32–35.9)
MCV RBC: 95.6 FL (ref 80–96)
NEUTROPHILS # BLD: 58.7 % (ref 42.8–82.8)
PLATELET # BLD AUTO: 145 K/MM3 (ref 134–434)
PMV BLD: 10.4 FL (ref 7.5–11.1)
PROT SERPL-MCNC: 7.1 G/DL (ref 6.4–8.2)
WBC # BLD AUTO: 5.6 K/MM3 (ref 4–10)

## 2017-04-02 RX ADMIN — HEPARIN SODIUM SCH UNIT: 5000 INJECTION, SOLUTION INTRAVENOUS; SUBCUTANEOUS at 22:30

## 2017-04-02 RX ADMIN — DULOXETINE SCH MG: 30 CAPSULE, DELAYED RELEASE ORAL at 11:38

## 2017-04-02 RX ADMIN — INSULIN ASPART SCH UNITS: 100 INJECTION, SOLUTION INTRAVENOUS; SUBCUTANEOUS at 17:36

## 2017-04-02 RX ADMIN — GABAPENTIN SCH MG: 300 CAPSULE ORAL at 12:10

## 2017-04-02 RX ADMIN — MULTIVITAMIN TABLET SCH TAB: TABLET at 11:39

## 2017-04-02 RX ADMIN — INSULIN ASPART SCH UNITS: 100 INJECTION, SOLUTION INTRAVENOUS; SUBCUTANEOUS at 22:31

## 2017-04-02 RX ADMIN — INSULIN ASPART SCH UNITS: 100 INJECTION, SOLUTION INTRAVENOUS; SUBCUTANEOUS at 06:06

## 2017-04-02 RX ADMIN — FUROSEMIDE SCH MG: 40 TABLET ORAL at 11:40

## 2017-04-02 RX ADMIN — INSULIN ASPART SCH UNITS: 100 INJECTION, SOLUTION INTRAVENOUS; SUBCUTANEOUS at 12:11

## 2017-04-02 RX ADMIN — PANTOPRAZOLE SODIUM SCH MG: 40 TABLET, DELAYED RELEASE ORAL at 11:42

## 2017-04-02 RX ADMIN — HEPARIN SODIUM SCH UNIT: 5000 INJECTION, SOLUTION INTRAVENOUS; SUBCUTANEOUS at 11:42

## 2017-04-02 RX ADMIN — BACITRACIN ZINC SCH APPLIC: 500 OINTMENT TOPICAL at 12:40

## 2017-04-02 RX ADMIN — ASPIRIN 81 MG SCH MG: 81 TABLET ORAL at 11:40

## 2017-04-02 RX ADMIN — CALCITRIOL SCH MCG: 0.25 CAPSULE ORAL at 11:40

## 2017-04-02 RX ADMIN — LATANOPROST SCH DRP: 50 SOLUTION OPHTHALMIC at 22:31

## 2017-04-02 RX ADMIN — AMLODIPINE BESYLATE SCH MG: 10 TABLET ORAL at 11:38

## 2017-04-02 RX ADMIN — CARVEDILOL SCH MG: 12.5 TABLET, FILM COATED ORAL at 22:30

## 2017-04-02 RX ADMIN — CARVEDILOL SCH MG: 12.5 TABLET, FILM COATED ORAL at 11:41

## 2017-04-02 RX ADMIN — GABAPENTIN SCH MG: 300 CAPSULE ORAL at 22:30

## 2017-04-02 NOTE — PN
Progress Note (short form)





- Note


Progress Note: 


Chief Complaint: Events noted, notes reviewed, continues to report vague left 

sided chest pain, intermittent, denies any dyspnea 





History of Present Illness: 


Seen and examined on telemetry. Events noted, notes reviewed, continues to 

report vague left sided chest pain, intermittent, denies any dyspnea 





Plan to proceed with echocardiography and MPI study in AM





- Current Medication List





 Current Medications





Acetaminophen (Tylenol -)  500 mg PO Q6H PRN


   PRN Reason: PAIN


Amlodipine Besylate (Norvasc -)  10 mg PO DAILY [facility]


   Last Admin: 04/01/17 09:04 Dose:  10 mg


Aspirin (Asa -)  81 mg PO DAILY [facility]


   Last Admin: 04/01/17 09:04 Dose:  81 mg


Bacitracin (Bacitracin -)  1 applic TP DAILY [facility]


   Last Admin: 04/01/17 14:20 Dose:  1 applic


Calcitriol (Rocaltrol -)  0.25 mcg PO DAILY [facility]


   Last Admin: 04/01/17 09:05 Dose:  0.25 mcg


Carvedilol (Coreg -)  6.25 mg PO BID [facility]


   Last Admin: 04/01/17 22:11 Dose:  6.25 mg


Clonidine (Catapres -)  0.1 mg PO BID [facility]


   Last Admin: 04/01/17 22:11 Dose:  0.1 mg


Duloxetine HCl (Cymbalta -)  30 mg PO DAILY [facility]


   Last Admin: 04/01/17 09:04 Dose:  30 mg


Furosemide (Lasix -)  40 mg PO DAILY [facility]


   Last Admin: 04/01/17 09:04 Dose:  40 mg


Gabapentin (Neurontin -)  300 mg PO BID [facility]


   Last Admin: 04/01/17 22:11 Dose:  300 mg


Heparin Sodium (Porcine) (Heparin -)  5,000 unit SQ BID [facility]


   Last Admin: 04/01/17 22:11 Dose:  5,000 unit


Insulin Aspart (Novolog Vial Sliding Scale -)  0 vial SQ ACHS [facility]


   PRN Reason: Protocol


   Last Admin: 04/02/17 06:06 Dose:  4 units


Latanoprost (Xalatan 0.005% Eye Drops -)  1 drop OU HS [facility]


   Last Admin: 04/01/17 22:11 Dose:  1 drp


Multivitamins/Minerals/Vitamin C (Tab-A-Vit -)  1 tab PO DAILY [facility]


   Last Admin: 04/01/17 09:02 Dose:  1 tab


Pantoprazole Sodium (Protonix -)  40 mg PO DAILY [facility]


   Last Admin: 04/01/17 09:03 Dose:  40 mg





- Review of Systems


Cardiovascular: As noted above


Respiratory: denies: Cough or Sputum Production


Gastrointestinal: denies: Nausea, Vomiting, Diarrhea, Constipation or Abdominal 

Pain  


Musculoskeletal:  No symptoms reported 


Neurological: Foot drop and spinal stenosis 





- Objective


Vital Signs: 





 Last Vital Signs











Temp Pulse Resp BP Pulse Ox


 


 98.4 F   85   20   139/66   96 


 


 04/02/17 06:00  04/02/17 08:14  04/02/17 08:14  04/02/17 08:14  04/01/17 21:00











Neck: Supple Negative JVD 


Cardiovascular: S1 S2 Regular Rate Rhythm 


Respiratory: Clear to A&P


Gastrointestinal: Soft Benign Normal Bowel Sounds


Ext: Negative Edema





Labs: 





 CBC, BMP





 04/02/17 06:02 





 04/02/17 06:02 





 INR, PTT











INR  0.97  (0.82-1.09)   03/31/17  10:43    








 Troponin, BNP











  04/01/17 04/01/17





  06:07 06:07


 


Troponin I  0.13 H D  Cancelled











Assessment/Plan





ASSESSMENT:





1. Chest pain syndrome, CAD angina pectoris with borderline Troponin I 

elevation not indicative of ACS clinically, no acute EKG changes


2. Diastolic LV dysfunction with class 0-I NYHA classification LV failure


3. HTN


4. IDDM


5. Hypercholesterolemia


6. Cervical Spinal Stenosis


7. CKD 


8. Neuropathy 





PLAN:





1. Continue Coreg and titrate dosage as tolerated, hemodynamics permitting


2. Continue Norvasc and Clonidine (down titrate)


3. Ideally should be on ACEI or ARBS unless contraindicated, pending renal 

function improvement and stabilization


4. Continue PO Lasix


5. Continue ASA


6. Echocardiography for evaluation of LV function


7. Pharmacologic MPI study to evaluate CAD/ischemia severity 














Debbie Bartholomew MD

## 2017-04-02 NOTE — PN
Progress Note (short form)





- Note


Progress Note: 


Subjective: The patient was seen at the bedside, he states he did not sleep 

well last night because it was cold in his room and that he is tired. 





 


 Current Medications











Generic Name Dose Route Start Last Admin





  Trade Name Freq  PRN Reason Stop Dose Admin


 


Acetaminophen  500 mg 04/01/17 00:50  





  Tylenol -  PO   





  Q6H PRN   





  PAIN   


 


Amlodipine Besylate  10 mg 04/01/17 10:00 04/01/17 09:04





  Norvasc -  PO   10 mg





  DAILY VICKIE   Administration


 


Aspirin  81 mg 04/01/17 10:00 04/01/17 09:04





  Asa -  PO   81 mg





  DAILY VICKIE   Administration


 


Bacitracin  1 applic 04/01/17 10:00 04/01/17 14:20





  Bacitracin -  TP   1 applic





  DAILY VICKIE   Administration


 


Calcitriol  0.25 mcg 04/01/17 10:00 04/01/17 09:05





  Rocaltrol -  PO   0.25 mcg





  DAILY VICKIE   Administration


 


Carvedilol  6.25 mg 04/01/17 11:15 04/01/17 22:11





  Coreg -  PO   6.25 mg





  BID VICKIE   Administration


 


Clonidine  0.1 mg 04/01/17 22:00 04/01/17 22:11





  Catapres -  PO   0.1 mg





  BID VICKIE   Administration


 


Duloxetine HCl  30 mg 04/01/17 10:00 04/01/17 09:04





  Cymbalta -  PO   30 mg





  DAILY VICKIE   Administration


 


Furosemide  40 mg 04/01/17 10:00 04/01/17 09:04





  Lasix -  PO   40 mg





  DAILY VICKIE   Administration


 


Gabapentin  300 mg 04/01/17 10:00 04/01/17 22:11





  Neurontin -  PO   300 mg





  BID VICKIE   Administration


 


Heparin Sodium (Porcine)  5,000 unit 04/01/17 10:00 04/01/17 22:11





  Heparin -  SQ   5,000 unit





  BID VICKIE   Administration


 


Insulin Aspart  0 vial 03/31/17 22:00 04/02/17 06:06





  Novolog Vial Sliding Scale -  SQ   4 units





  [facility] VICK[facility]   Administration





  Protocol   


 


Latanoprost  1 drop 04/01/17 22:00 04/01/17 22:11





  Xalatan 0.005% Eye Drops -  OU   1 drp





  HS VICKIE   Administration


 


Multivitamins/Minerals/Vitamin C  1 tab 04/01/17 10:00 04/01/17 09:02





  Tab-A-Vit -  PO   1 tab





  DAILY VICKIE   Administration


 


Pantoprazole Sodium  40 mg 04/01/17 10:00 04/01/17 09:03





  Protonix -  PO   40 mg





  DAILY VICKIE   Administration








Objective: 


 


 Vital Signs











 Period  Temp  Pulse  Resp  BP Sys/Bellamy  Pulse Ox


 


 Last 24 Hr  97.6 F-98.4 F  58-85  16-20  116-148/66-88  96








Physical Exam: 


General: NAD, A&Ox3


Lungs: CTA bilaterally


Heart: RRR, S1S2


Abd: Soft, non-tender, non-distended. Normoactive bowel sounds


Ext: Warm, well-perfused. 2+ DP/PT bilaterally


Neuro: Patient refused neuro exam





 


 CBCD











WBC  5.6 K/mm3 (4.0-10.0)   04/02/17  06:02    


 


RBC  4.36 M/mm3 (4.00-5.60)   04/02/17  06:02    


 


Hgb  13.7 GM/dL (11.7-16.9)   04/02/17  06:02    


 


Hct  41.7 % (35.4-49)   04/02/17  06:02    


 


MCV  95.6 fl (80-96)   04/02/17  06:02    


 


MCHC  33.0 g/dl (32.0-35.9)   04/02/17  06:02    


 


RDW  13.7 % (11.9-15.9)   04/02/17  06:02    


 


Plt Count  145 K/MM3 (134-434)   04/02/17  06:02    


 


MPV  10.4 fl (7.5-11.1)   04/02/17  06:02    








 CMP











Sodium  140 mmol/L (136-145)   04/02/17  06:02    


 


Potassium  4.4 mmol/L (3.5-5.1)   04/02/17  06:02    


 


Chloride  103 mmol/L ()   04/02/17  06:02    


 


Carbon Dioxide  28 mmol/L (21-32)  D 04/02/17  06:02    


 


Anion Gap  9  (8-16)   04/02/17  06:02    


 


BUN  33 mg/dL (7-18)  H  04/02/17  06:02    


 


Creatinine  2.5 mg/dL (0.7-1.3)  H  04/02/17  06:02    


 


Creat Clearance w eGFR  26.48  (>60)   04/02/17  06:02    


 


Random Glucose  215 mg/dL ()  H D 04/02/17  06:02    


 


Calcium  8.7 mg/dL (8.5-10.1)   04/02/17  06:02    


 


Total Bilirubin  0.6 mg/dL (0.2-1.0)  D 04/02/17  06:02    


 


AST  17 U/L (15-37)   04/02/17  06:02    


 


ALT  19 U/L (12-78)   04/02/17  06:02    


 


Alkaline Phosphatase  80 U/L ()   04/02/17  06:02    


 


Total Protein  7.1 g/dl (6.4-8.2)   04/02/17  06:02    


 


Albumin  3.5 g/dl (3.4-5.0)   04/02/17  06:02    








 CARDIAC ENZYMES











Creatine Kinase  156 IU/L ()   04/01/17  06:07    


 


Troponin I  0.13 ng/ml (0.00-0.05)  H D 04/01/17  06:07    











Assessment: This is a 61 year old male with PMHx of IDDM, HTN, CKD (RTA4), 

cervical spinal stenosis, neuropathy, R- Foot Drop, atrophy, depression who 

presented to the ED after being found unresponsive with a BGM of 30 (responded 

to D10). 





Plan:


1) Cardiology: Chest pain


- Trops elevated, however trended down. Per cardiology, not indicative of ACS 

clinically. No EKG acute changes


- F/u ECHO


- F/u pharmacologic MPI


Diastolic LV dysfunction


- Continue Carvedilol


- Continue Lasix


- Continue Norvasc


- Continue ASA


- Appreciate cardiology consult


HTN


- As above





2) : CKD


- Baseline Cr 2.5-2.6


- Cr today 2.5


- Continue to monitor


- F/u outpatient nephrologist





3) Endocrine: IDDM


- Period of hypoglycemia requiring D10 prior to ED arrival


- BGM ACHS


- ISS ACHS


- Patient takes Glargine 36u sq daily at home, will hold for now and monitor 

novolog requirements





4) F/E/N:


- Monitor electrolytes


- Sodium controlled diet





5) Prophylaxis: 


- OOB ambulating


- Heparin 5,000u sq bid





6) Dispo:


- Requires continued inpatient care





CODE STATUS: FULL CODE





Visit type





- Emergency Visit


Emergency Visit: Yes


ED Registration Date: 03/31/17


Care time: The patient presented to the Emergency Department on the above date 

and was hospitalized for further evaluation of their emergent condition.





- New Patient


This patient is new to me today: No





- Critical Care


Critical Care patient: No

## 2017-04-03 LAB
ANION GAP SERPL CALC-SCNC: 11 MMOL/L (ref 8–16)
ANION GAP SERPL CALC-SCNC: 12 MMOL/L (ref 8–16)
CALCIUM SERPL-MCNC: 8.6 MG/DL (ref 8.5–10.1)
CALCIUM SERPL-MCNC: 9 MG/DL (ref 8.5–10.1)
CO2 SERPL-SCNC: 24 MMOL/L (ref 21–32)
CO2 SERPL-SCNC: 25 MMOL/L (ref 21–32)
CREAT SERPL-MCNC: 2.9 MG/DL (ref 0.7–1.3)
CREAT SERPL-MCNC: 3.3 MG/DL (ref 0.7–1.3)
GLUCOSE SERPL-MCNC: 298 MG/DL (ref 74–106)
GLUCOSE SERPL-MCNC: 304 MG/DL (ref 74–106)

## 2017-04-03 RX ADMIN — ASPIRIN 81 MG SCH MG: 81 TABLET ORAL at 13:01

## 2017-04-03 RX ADMIN — BACITRACIN ZINC SCH APPLIC: 500 OINTMENT TOPICAL at 13:01

## 2017-04-03 RX ADMIN — INSULIN ASPART SCH UNITS: 100 INJECTION, SOLUTION INTRAVENOUS; SUBCUTANEOUS at 17:12

## 2017-04-03 RX ADMIN — DULOXETINE SCH MG: 30 CAPSULE, DELAYED RELEASE ORAL at 13:01

## 2017-04-03 RX ADMIN — HEPARIN SODIUM SCH UNIT: 5000 INJECTION, SOLUTION INTRAVENOUS; SUBCUTANEOUS at 13:02

## 2017-04-03 RX ADMIN — PANTOPRAZOLE SODIUM SCH MG: 40 TABLET, DELAYED RELEASE ORAL at 12:59

## 2017-04-03 RX ADMIN — INSULIN ASPART SCH: 100 INJECTION, SOLUTION INTRAVENOUS; SUBCUTANEOUS at 07:15

## 2017-04-03 RX ADMIN — CARVEDILOL SCH MG: 12.5 TABLET, FILM COATED ORAL at 22:19

## 2017-04-03 RX ADMIN — CALCITRIOL SCH MCG: 0.25 CAPSULE ORAL at 13:00

## 2017-04-03 RX ADMIN — FUROSEMIDE SCH: 40 TABLET ORAL at 14:00

## 2017-04-03 RX ADMIN — AMLODIPINE BESYLATE SCH: 10 TABLET ORAL at 14:00

## 2017-04-03 RX ADMIN — LATANOPROST SCH DRP: 50 SOLUTION OPHTHALMIC at 22:27

## 2017-04-03 RX ADMIN — GABAPENTIN SCH MG: 300 CAPSULE ORAL at 13:00

## 2017-04-03 RX ADMIN — HEPARIN SODIUM SCH UNIT: 5000 INJECTION, SOLUTION INTRAVENOUS; SUBCUTANEOUS at 22:19

## 2017-04-03 RX ADMIN — INSULIN ASPART SCH UNITS: 100 INJECTION, SOLUTION INTRAVENOUS; SUBCUTANEOUS at 13:00

## 2017-04-03 RX ADMIN — GABAPENTIN SCH MG: 300 CAPSULE ORAL at 22:20

## 2017-04-03 RX ADMIN — MULTIVITAMIN TABLET SCH TAB: TABLET at 12:59

## 2017-04-03 RX ADMIN — INSULIN ASPART SCH UNITS: 100 INJECTION, SOLUTION INTRAVENOUS; SUBCUTANEOUS at 22:27

## 2017-04-03 RX ADMIN — CARVEDILOL SCH: 12.5 TABLET, FILM COATED ORAL at 10:00

## 2017-04-03 NOTE — PN
[id number] has just returned from stress test at which he became hypotensive 

and had an episode of vomiting. IV fluid bolus given with improvement in his 

BP. He reports feeling better now. 


Awaiting ECHO report


Stress test overall negative persantine stress, EF 67%


 


 Current Medications











Generic Name Dose Route Start Last Admin





  Trade Name Freq  PRN Reason Stop Dose Admin


 


Acetaminophen  500 mg 04/01/17 00:50 04/02/17 12:40





  Tylenol -  PO   500 mg





  Q6H PRN   Administration





  PAIN   


 


Amlodipine Besylate  10 mg 04/01/17 10:00 04/03/17 14:00





  Norvasc -  PO   Not Given





  DAILY VICKIE   


 


Aspirin  81 mg 04/01/17 10:00 04/03/17 13:01





  Asa -  PO   81 mg





  DAILY VICKIE   Administration


 


Bacitracin  1 applic 04/01/17 10:00 04/03/17 13:01





  Bacitracin -  TP   1 applic





  DAILY VICKIE   Administration


 


Calcitriol  0.25 mcg 04/01/17 10:00 04/03/17 13:00





  Rocaltrol -  PO   0.25 mcg





  DAILY VICKIE   Administration


 


Carvedilol  12.5 mg 04/02/17 09:59 04/03/17 10:00





  Coreg -  PO   Not Given





  BID [facility]   


 


Clonidine  0.1 mg 04/02/17 10:00 04/03/17 17:48





  Catapres -  PO   Not Given





  DAILY VICKIE   


 


Duloxetine HCl  30 mg 04/01/17 10:00 04/03/17 13:01





  Cymbalta -  PO   30 mg





  DAILY VICKIE   Administration


 


Furosemide  40 mg 04/01/17 10:00 04/03/17 14:00





  Lasix -  PO   Not Given





  DAILY VICKIE   


 


Gabapentin  300 mg 04/01/17 10:00 04/03/17 13:00





  Neurontin -  PO   300 mg





  BID [facility]   Administration


 


Heparin Sodium (Porcine)  5,000 unit 04/01/17 10:00 04/03/17 13:02





  Heparin -  SQ   5,000 unit





  BID [facility]   Administration


 


Insulin Aspart  0 vial 03/31/17 22:00 04/03/17 17:12





  Novolog Vial Sliding Scale -  SQ   8 units





  [facility]   Administration





  Protocol   


 


Insulin Detemir  10 units 04/03/17 22:00  





  Levemir Vial  SQ   





  HS VICKIE   


 


Latanoprost  1 drop 04/01/17 22:00 04/02/17 22:31





  Xalatan 0.005% Eye Drops -  OU   1 drp





  HS [facility]   Administration


 


Multivitamins/Minerals/Vitamin C  1 tab 04/01/17 10:00 04/03/17 12:59





  Tab-A-Vit -  PO   1 tab





  DAILY [facility]   Administration


 


Pantoprazole Sodium  40 mg 04/01/17 10:00 04/03/17 12:59





  Protonix -  PO   40 mg





  DAILY VICKIE   Administration








Objective: 


 


 


 Vital Signs











 Period  Temp  Pulse  Resp  BP Sys/Bellamy  Pulse Ox


 


 Last 24 Hr  97.6 F-98.2 F  57-96  16-20  /60-82  97-98








Physical Exam: 


General: NAD, A&Ox3


Lungs: CTA bilaterally


Heart: RRR, S1S2


Abd: Soft, non-tender, non-distended. Normoactive bowel sounds


Ext: Warm, well-perfused. 2+ DP/PT bilaterally


Neuro: CN 2-12 intact





 


 CBCD











WBC  5.6 K/mm3 (4.0-10.0)   04/02/17  06:02    


 


RBC  4.36 M/mm3 (4.00-5.60)   04/02/17  06:02    


 


Hgb  13.7 GM/dL (11.7-16.9)   04/02/17  06:02    


 


Hct  41.7 % (35.4-49)   04/02/17  06:02    


 


MCV  95.6 fl (80-96)   04/02/17  06:02    


 


MCHC  33.0 g/dl (32.0-35.9)   04/02/17  06:02    


 


RDW  13.7 % (11.9-15.9)   04/02/17  06:02    


 


Plt Count  145 K/MM3 (134-434)   04/02/17  06:02    


 


MPV  10.4 fl (7.5-11.1)   04/02/17  06:02    








 CMP











Sodium  136 mmol/L (136-145)   04/03/17  15:15    


 


Potassium  5.0 mmol/L (3.5-5.1)   04/03/17  15:15    


 


Chloride  100 mmol/L ()   04/03/17  15:15    


 


Carbon Dioxide  24 mmol/L (21-32)   04/03/17  15:15    


 


Anion Gap  12  (8-16)   04/03/17  15:15    


 


BUN  45 mg/dL (7-18)  H  04/03/17  15:15    


 


Creatinine  3.3 mg/dL (0.7-1.3)  H  04/03/17  15:15    


 


Creat Clearance w eGFR  26.48  (>60)   04/02/17  06:02    


 


Random Glucose  304 mg/dL ()  H*  04/03/17  15:15    


 


Calcium  8.6 mg/dL (8.5-10.1)   04/03/17  15:15    


 


Total Bilirubin  0.6 mg/dL (0.2-1.0)  D 04/02/17  06:02    


 


AST  17 U/L (15-37)   04/02/17  06:02    


 


ALT  19 U/L (12-78)   04/02/17  06:02    


 


Alkaline Phosphatase  80 U/L ()   04/02/17  06:02    


 


Total Protein  7.1 g/dl (6.4-8.2)   04/02/17  06:02    


 


Albumin  3.5 g/dl (3.4-5.0)   04/02/17  06:02    








 CARDIAC ENZYMES











Creatine Kinase  156 IU/L ()   04/01/17  06:07    


 


Troponin I  0.13 ng/ml (0.00-0.05)  H D 04/01/17  06:07    











Assessment: This is a 61 year old male with PMHx of IDDM, HTN, CKD (RTA4), 

cervical spinal stenosis, neuropathy, R- Foot Drop, atrophy, depression who 

presented to the ED after being found unresponsive with a BGM of 30 (responded 

to D10). 





Plan:


1) Cardiology: Chest pain


- Trops elevated, however trended down. Per cardiology, not indicative of ACS 

clinically. No EKG acute changes


- F/u ECHO (performed today)


- Persantine stress overall negative. EF 67%


Diastolic LV dysfunction


- Continue Carvedilol


- Continue Lasix


- Continue Norvasc


- Continue ASA


- Appreciate cardiology consult


HTN


- As above





2) : CKD


- Baseline Cr 2.5-2.6


- Cr today increasing 2.9->3.3 (patient reports urinating with no issues)


- Called [facility] for outpatient 

nephrologist information and to discuss outpatient creatinine


- Monitor kidney function overnight, if continues to worsen, f/u kidney/bladder 

ultrasound, urine studies


- F/u outpatient nephrologist





3) Endocrine: IDDM


- Period of hypoglycemia requiring D10 prior to ED arrival


- BGM ACHS


- ISS ACHS


- Patient takes Glargine 36u sq daily at home, will start Levemir 10u sq qhs 

and monitor bgms





4) F/E/N:


- Monitor electrolytes


- Sodium controlled diet





5) Prophylaxis: 


- OOB ambulating


- Heparin 5,000u sq bid





6) Dispo:


- Requires continued inpatient care





CODE STATUS: FULL CODE





Visit type





- Emergency Visit


Emergency Visit: Yes


ED Registration Date: 03/31/17


Care time: The patient presented to the Emergency Department on the above date 

and was hospitalized for further evaluation of their emergent condition.





- New Patient


This patient is new to me today: No





- Critical Care


Critical Care patient: No

## 2017-04-03 NOTE — PN
Progress Note, Physician


Chief Complaint: 


Events noted


Complains of left sided chest pain


Episode of hypotension and sweating prior to nuclear stress test


Patient was given IV fluids and it resolved. ECG did not reveal any changes


History of Present Illness: 


Patient was seen and examined. Awake and alert. Chart was reviewed


As outlined. Episode of chest discomfort





- Current Medication List


Current Medications: 


Active Medications





Acetaminophen (Tylenol -)  500 mg PO Q6H PRN


   PRN Reason: PAIN


   Last Admin: 04/02/17 12:40 Dose:  500 mg


Amlodipine Besylate (Norvasc -)  10 mg PO DAILY [facility]


   Last Admin: 04/02/17 11:38 Dose:  10 mg


Aspirin (Asa -)  81 mg PO DAILY [facility]


   Last Admin: 04/03/17 13:01 Dose:  81 mg


Bacitracin (Bacitracin -)  1 applic TP DAILY [facility]


   Last Admin: 04/03/17 13:01 Dose:  1 applic


Calcitriol (Rocaltrol -)  0.25 mcg PO DAILY [facility]


   Last Admin: 04/03/17 13:00 Dose:  0.25 mcg


Carvedilol (Coreg -)  12.5 mg PO BID [facility]


   Last Admin: 04/02/17 22:30 Dose:  12.5 mg


Clonidine (Catapres -)  0.1 mg PO DAILY [facility]


   Last Admin: 04/02/17 11:41 Dose:  0.1 mg


Duloxetine HCl (Cymbalta -)  30 mg PO DAILY [facility]


   Last Admin: 04/03/17 13:01 Dose:  30 mg


Furosemide (Lasix -)  40 mg PO DAILY [facility]


   Last Admin: 04/02/17 11:40 Dose:  40 mg


Gabapentin (Neurontin -)  300 mg PO BID [facility]


   Last Admin: 04/03/17 13:00 Dose:  300 mg


Heparin Sodium (Porcine) (Heparin -)  5,000 unit SQ BID [facility]


   Last Admin: 04/03/17 13:02 Dose:  5,000 unit


Insulin Aspart (Novolog Vial Sliding Scale -)  0 vial SQ ACHS [facility]


   PRN Reason: Protocol


   Last Admin: 04/03/17 13:00 Dose:  8 units


Latanoprost (Xalatan 0.005% Eye Drops -)  1 drop OU HS [facility]


   Last Admin: 04/02/17 22:31 Dose:  1 drp


Multivitamins/Minerals/Vitamin C (Tab-A-Vit -)  1 tab PO DAILY [facility]


   Last Admin: 04/03/17 12:59 Dose:  1 tab


Pantoprazole Sodium (Protonix -)  40 mg PO DAILY [facility]


   Last Admin: 04/03/17 12:59 Dose:  40 mg











- Objective


Vital Signs: 


 Vital Signs











Temperature  97.8 F   04/03/17 13:46


 


Pulse Rate  57 L  04/03/17 13:46


 


Respiratory Rate  20   04/03/17 13:46


 


Blood Pressure  132/60   04/03/17 13:46


 


O2 Sat by Pulse Oximetry (%)  98   04/03/17 09:00











Neck: Yes: Supple


Cardiovascular: Yes: Regular Rate and Rhythm, S1, S2


Respiratory: Yes: Regular, CTA Bilaterally


Gastrointestinal: Yes: Normal Bowel Sounds, Soft.  No: Tenderness


Edema: No


Additional Findings/Remarks: 








- Review of Systems


Cardiovascular: As noted above


Respiratory: denies: Cough or Sputum Production


Gastrointestinal: denies: Nausea, Vomiting, Diarrhea, Constipation or Abdominal 

Pain  


Musculoskeletal:  No symptoms reported 


Neurological: Foot drop and spinal stenosis 








Labs: 


 CBC, BMP





 04/02/17 06:02 





 04/03/17 06:00 





 INR, PTT











INR  0.97  (0.82-1.09)   03/31/17  10:43    














Problem List





- Problems


(1) CKD (chronic kidney disease)


Code(s): N18.9 - CHRONIC KIDNEY DISEASE, UNSPECIFIED   





(2) Chest pain


Code(s): R07.9 - CHEST PAIN, UNSPECIFIED   





(3) Diabetes mellitus


Code(s): E11.9 - TYPE 2 DIABETES MELLITUS WITHOUT COMPLICATIONS   Qualifiers: 


     Diabetes mellitus type: type 2     Diabetes mellitus complication status: 

without complication     Diabetes mellitus long term insulin use: without long 

term use        Qualified Code(s): E11.9 - Type 2 diabetes mellitus without 

complications  





(4) HTN (hypertension)


Code(s): I10 - ESSENTIAL (PRIMARY) HYPERTENSION   Qualifiers: 


     Hypertension type: essential hypertension        Qualified Code(s): I10 - 

Essential (primary) hypertension  





(5) Neuropathy due to type 2 diabetes mellitus


Code(s): E11.40 - TYPE 2 DIABETES MELLITUS WITH DIABETIC NEUROPATHY, UNSP








Assessment/Plan


1. Chest pain syndrome, CAD angina pectoris with borderline Troponin I 

elevation not indicative of ACS clinically


2. Diastolic LV dysfunction with class 0-I NYHA classification LV failure


3. HTN


4. IDDM


5. Hypercholesterolemia


6. Cervical Spinal Stenosis


7. CKD 


8. Neuropathy 





PLAN:


1. Continue Coreg and titrate dosage as tolerated and continue Norvasc and 

Clonidine (down titrate)


2. Ideally should be on ACEI or ARB unless contraindicated, pending renal 

function improvement and stabilization


3. Continue PO Lasix


4. Continue ASA


5. Transthoracic echocardiography for evaluation of LV and valvular function


6. Pharmacologic nuclear myocardial perfusion study to evaluate CAD/ischemia 

severity





Further plans are to follow


Mejia Nunez MD

## 2017-04-04 LAB
ANION GAP SERPL CALC-SCNC: 13 MMOL/L (ref 8–16)
APPEARANCE UR: CLEAR
BASOPHILS # BLD: 0.5 % (ref 0–2)
BILIRUB UR STRIP.AUTO-MCNC: NEGATIVE MG/DL
CALCIUM SERPL-MCNC: 8.8 MG/DL (ref 8.5–10.1)
CO2 SERPL-SCNC: 25 MMOL/L (ref 21–32)
COLOR UR: (no result)
CREAT SERPL-MCNC: 3.3 MG/DL (ref 0.7–1.3)
DEPRECATED RDW RBC AUTO: 13.8 % (ref 11.9–15.9)
EOSINOPHIL # BLD: 3.5 % (ref 0–4.5)
GLUCOSE SERPL-MCNC: 261 MG/DL (ref 74–106)
KETONES UR QL STRIP: NEGATIVE
LEUKOCYTE ESTERASE UR QL STRIP.AUTO: NEGATIVE
MCH RBC QN AUTO: 31.7 PG (ref 25.7–33.7)
MCHC RBC AUTO-ENTMCNC: 32.6 G/DL (ref 32–35.9)
MCV RBC: 97.1 FL (ref 80–96)
NEUTROPHILS # BLD: 64.9 % (ref 42.8–82.8)
NITRITE UR QL STRIP: NEGATIVE
PH UR: 5 [PH] (ref 5–8)
PLATELET # BLD AUTO: 138 K/MM3 (ref 134–434)
PMV BLD: 10.2 FL (ref 7.5–11.1)
PROT UR QL STRIP: (no result)
PROT UR QL STRIP: NEGATIVE
RBC # UR STRIP: NEGATIVE /UL
SP GR UR: 1 (ref 1–1.03)
UROBILINOGEN UR STRIP-MCNC: NEGATIVE E.U./DL (ref 0.2–1)
WBC # BLD AUTO: 6.4 K/MM3 (ref 4–10)

## 2017-04-04 RX ADMIN — INSULIN DETEMIR SCH UNITS: 100 INJECTION, SOLUTION SUBCUTANEOUS at 22:16

## 2017-04-04 RX ADMIN — DULOXETINE SCH MG: 30 CAPSULE, DELAYED RELEASE ORAL at 10:01

## 2017-04-04 RX ADMIN — INSULIN ASPART SCH UNITS: 100 INJECTION, SOLUTION INTRAVENOUS; SUBCUTANEOUS at 18:01

## 2017-04-04 RX ADMIN — FUROSEMIDE SCH MG: 40 TABLET ORAL at 10:02

## 2017-04-04 RX ADMIN — AMLODIPINE BESYLATE SCH MG: 10 TABLET ORAL at 10:01

## 2017-04-04 RX ADMIN — CARVEDILOL SCH MG: 12.5 TABLET, FILM COATED ORAL at 22:15

## 2017-04-04 RX ADMIN — INSULIN ASPART SCH UNITS: 100 INJECTION, SOLUTION INTRAVENOUS; SUBCUTANEOUS at 22:16

## 2017-04-04 RX ADMIN — GABAPENTIN SCH MG: 300 CAPSULE ORAL at 22:15

## 2017-04-04 RX ADMIN — BACITRACIN ZINC SCH APPLIC: 500 OINTMENT TOPICAL at 10:02

## 2017-04-04 RX ADMIN — HEPARIN SODIUM SCH UNIT: 5000 INJECTION, SOLUTION INTRAVENOUS; SUBCUTANEOUS at 22:16

## 2017-04-04 RX ADMIN — HEPARIN SODIUM SCH UNIT: 5000 INJECTION, SOLUTION INTRAVENOUS; SUBCUTANEOUS at 10:02

## 2017-04-04 RX ADMIN — INSULIN ASPART SCH UNITS: 100 INJECTION, SOLUTION INTRAVENOUS; SUBCUTANEOUS at 06:03

## 2017-04-04 RX ADMIN — CARVEDILOL SCH MG: 12.5 TABLET, FILM COATED ORAL at 10:01

## 2017-04-04 RX ADMIN — GABAPENTIN SCH MG: 300 CAPSULE ORAL at 10:01

## 2017-04-04 RX ADMIN — ASPIRIN 81 MG SCH MG: 81 TABLET ORAL at 10:01

## 2017-04-04 RX ADMIN — MULTIVITAMIN TABLET SCH TAB: TABLET at 10:01

## 2017-04-04 RX ADMIN — CALCITRIOL SCH MCG: 0.25 CAPSULE ORAL at 10:03

## 2017-04-04 RX ADMIN — PANTOPRAZOLE SODIUM SCH MG: 40 TABLET, DELAYED RELEASE ORAL at 10:02

## 2017-04-04 RX ADMIN — INSULIN ASPART SCH UNITS: 100 INJECTION, SOLUTION INTRAVENOUS; SUBCUTANEOUS at 12:32

## 2017-04-04 RX ADMIN — LATANOPROST SCH: 50 SOLUTION OPHTHALMIC at 23:45

## 2017-04-04 NOTE — PN
Physical Exam: 


SUBJECTIVE: Patient seen and examined. Reports dizzy spell just prior to my 

attending him. Tele monitor checked, no acute events noted. Pt reports he 

became dizzy while standing at the sink fixing his hair after getting up from 

the toilet. He had voided and had a BM at the time. Received a TC from lab, 

ammonia level 255, but is from 3/31.








OBJECTIVE:


Vital Signs - 24 hr





 3





  17





  18:00 21:00 22:00


 


Temperature 97.7 F  97.8 F


 


Pulse Rate 96 H  72


 


Respiratory 20  20





Rate   


 


Blood Pressure 109/66  140/89


 


O2 Sat by Pulse  98 





Oximetry (%)   








 3





  17





  02:00 05:52 10:00


 


Temperature  98 F 97.0 F L


 


Pulse Rate 68 72 76


 


Respiratory 20 18 18





Rate   


 


Blood Pressure 136/72 112/54 155/88


 


O2 Sat by Pulse   





Oximetry (%)   








Orthostatic vitals:


layin/74, P65


sitting 124/83, P74


standing 79/51, P76





GENERAL: The patient is awake, alert, and fully oriented, in no acute distress.


HEAD: Normal with no signs of trauma.


EYES: PERRL, extraocular movements intact, sclera anicteric, conjunctiva clear. 

No ptosis. 


ENT: Ears normal, nares patent, oropharynx clear without exudates, moist mucous 


membranes.


NECK: Trachea midline, full range of motion, supple. 


LUNGS: Breath sounds equal, clear to auscultation bilaterally, no wheezes, no 

crackles, no 


accessory muscle use. 


HEART: Regular rate and rhythm, S1, S2 without murmur, rub or gallop.


ABDOMEN: Soft, nontender, nondistended, normoactive bowel sounds, no guarding, 

no 


rebound, no hepatosplenomegaly, no masses.


EXTREMITIES: 2+ pulses, warm, well-perfused, no edema. 


NEUROLOGICAL: Cranial nerves II through XII grossly intact. Normal speech, gait 

not 


observed.


PSYCH: Normal mood, normal affect.


SKIN: Warm, dry, normal turgor, no rashes or lesions noted





Laboratory Results - last 24 hr





 3





  17





  15:15 15:36 22:21


 


WBC   


 


RBC   


 


Hgb   


 


Hct   


 


MCV   


 


MCHC   


 


RDW   


 


Plt Count   


 


MPV   


 


Neutrophils %   


 


Lymphocytes %   


 


Monocytes %   


 


Eosinophils %   


 


Basophils %   


 


Sodium  136  


 


Potassium  5.0  


 


Chloride  100  


 


Carbon Dioxide  24  


 


Anion Gap  12  


 


BUN  45 H  


 


Creatinine  3.3 H  


 


POC Glucometer   333  209


 


Random Glucose  304 H*  


 


Calcium  8.6  








 3





  17





  05:35 06:01 12:14


 


WBC   


 


RBC   


 


Hgb   


 


Hct   


 


MCV   


 


MCHC   


 


RDW   


 


Plt Count   


 


MPV   


 


Neutrophils %   


 


Lymphocytes %   


 


Monocytes %   


 


Eosinophils %   


 


Basophils %   


 


Sodium  137  


 


Potassium  4.4  


 


Chloride  99  


 


Carbon Dioxide  25  


 


Anion Gap  13  


 


BUN  47 H  


 


Creatinine  3.3 H  


 


POC Glucometer   264  247


 


Random Glucose  261 H  


 


Calcium  8.8  








 3





  17





  12:45


 


WBC  6.4


 


RBC  4.44


 


Hgb  14.1


 


Hct  43.1


 


MCV  97.1 H


 


MCHC  32.6


 


RDW  13.8


 


Plt Count  138


 


MPV  10.2


 


Neutrophils %  64.9


 


Lymphocytes %  23.2


 


Monocytes %  7.9


 


Eosinophils %  3.5


 


Basophils %  0.5


 


Sodium 


 


Potassium 


 


Chloride 


 


Carbon Dioxide 


 


Anion Gap 


 


BUN 


 


Creatinine 


 


POC Glucometer 


 


Random Glucose 


 


Calcium 








Active Medications





 3





Generic Name Dose Route Start Last Admin





  Trade Name Freq  PRN Reason Stop Dose Admin


 


Acetaminophen  500 mg 17 00:50 17 12:40





  Tylenol -  PO   500 mg





  Q6H PRN   Administration





  PAIN   


 


Amlodipine Besylate  10 mg 17 10:00 17 10:01





  Norvasc -  PO   10 mg





  DAILY [facility]   Administration


 


Aspirin  81 mg 17 10:00 17 10:01





  Asa -  PO   81 mg





  DAILY [facility]   Administration


 


Bacitracin  1 applic 17 10:00 17 10:02





  Bacitracin -  TP   1 applic





  DAILY [facility]   Administration


 


Calcitriol  0.25 mcg 17 10:00 17 10:03





  Rocaltrol -  PO   0.25 mcg





  DAILY [facility]   Administration


 


Carvedilol  12.5 mg 17 09:59 17 10:01





  Coreg -  PO   12.5 mg





  BID [facility]   Administration


 


Clonidine  0.1 mg 17 10:00 17 10:02





  Catapres -  PO   0.1 mg





  DAILY [facility]   Administration


 


Duloxetine HCl  30 mg 17 10:00 17 10:01





  Cymbalta -  PO   30 mg





  DAILY [facility]   Administration


 


Furosemide  40 mg 17 10:00 17 10:02





  Lasix -  PO   40 mg





  DAILY [facility]   Administration


 


Gabapentin  300 mg 17 10:00 17 10:01





  Neurontin -  PO   300 mg





  BID [facility]   Administration


 


Heparin Sodium (Porcine)  5,000 unit 17 10:00 17 10:02





  Heparin -  SQ   5,000 unit





  BID [facility]   Administration


 


Insulin Aspart  0 vial 17 22:00 17 12:32





  Novolog Vial Sliding Scale -  SQ   4 units





  [facility]   Administration





  Protocol   


 


Insulin Detemir  10 units 17 22:00 17 22:27





  Levemir Vial  SQ   10 units





  HS [facility]   Administration


 


Latanoprost  1 drop 17 22:00 17 22:27





  Xalatan 0.005% Eye Drops -  OU   1 drp





  HS [facility]   Administration


 


Multivitamins/Minerals/Vitamin C  1 tab 17 10:00 17 10:01





  Tab-A-Vit -  PO   1 tab





  DAILY [facility]   Administration


 


Pantoprazole Sodium  40 mg 17 10:00 17 10:02





  Protonix -  PO   40 mg





  DAILY [facility]   Administration








Echo 4/3/17:


Interpretation Summary


The left ventricle is normal in size


LV syst function is normal


No wall motion abnl


Suggesion of impaired LV relaxation


Mild mitral annular calcification


mild tricuspid regurgitation


no pericardial effusion








ASSESSMENT/PLAN:


This is a 61 year old male with PMHx of DM, HTN, CKD (RTA4), cervical spinal 

stenosis, neuropathy, R- Foot Drop, atrophy, depression who presented to the ED 

after being found unresponsive with a BGM of 30 (responded to D10). He also 

reported chest pain and thus was admitted for further evaluation. 





Orthostatic hypotension


   - dc clonidine, decrease norvasc to 5mg QD, maintain coreg for now


   - repeat orthostatics in AM. 


   - cardiology consulted, aware of findings and in agreement with plan.





DM


   - hypoglycemic episode resolved


   - restarted levemir 10u last night, BGM remain in 200s. Will increase to BID 

and follow sugars. (previously on lantus 36u)


   - cont novolog SS





chest pain


   - ACS ruled out, stress test grossly normal


   - cont coreg, ASA





CKD (RTA4) with LUIS ENRIQUE


   - baseline Cr 2.5-2.6 as per nurse at [facility], no improvement, 

renal consult ordered


   - hold lasix as recommended


   - sono as ordered.





elevated ammonia level


   - doubt accurate given pt mental status is fine


   - repeat ordered stat





Depression


   - cont home meds.





FEN


   - tolerating po


   - monitor BMP in am


   - sodium/diabetic diet





Dispo: Pt continues to require inpatient care. 





Visit type





- Emergency Visit


Emergency Visit: Yes


ED Registration Date: 17


Care time: The patient presented to the Emergency Department on the above date 

and was hospitalized for further evaluation of their emergent condition.





- New Patient


This patient is new to me today: Yes


Date on this admission: 17





- Critical Care


Critical Care patient: No





- Discharge Referral


Referred to [facility] No

## 2017-04-04 NOTE — CONSULT
Consult


Consult Specialty:: Nephrology


Reason for Consultation:: CKD





- History of Present Illness


Chief Complaint: initially presented s/p LOC


History of Present Illness: 


Pt is a 61 year old male with pmhx of HTN, DM, CKD, liver disease, and 

depression who presented to the ER after LOC. He also remembers having chest 

pain. He had a cardiac workup while in the hospital. His creatinine however has 

been steadily rising. He says she does have history of kidney disease which he 

says is attributed to his DM. He has seen a nephrologist in the [place] in the 

past. He does not recall getting a kidney biopsy. He denies hematuria or 

dysuria. 





- History Source


History Provided By: Patient, Medical Record





- Past Medical History


Cardio/Vascular: Yes: HTN


Renal/: Yes: Renal Inusuff


Endocrine: Yes: Diabetes Mellitus





- Alcohol/Substance Use


Hx Alcohol Use: Yes (QUIT IN 2006)





- Smoking History


Smoking history: Unknown if ever smoked





Home Medications





- Allergies


Allergies/Adverse Reactions: 


 Allergies











Allergy/AdvReac Type Severity Reaction Status Date / Time


 


No Known Drug Allergies Allergy   Verified 11/16/16 09:26














- Home Medications


Home Medications: 


Ambulatory Orders





Amlodipine Besylate [Norvasc -] 10 mg PO DAILY 11/15/16 


Calcitriol [Calcitriol -] 0.25 mcg PO DAILY 11/15/16 


Clonidine HCl 0.1 mg PO TID 11/15/16 


Furosemide [Lasix -] 40 mg PO DAILY 11/15/16 


Gabapentin [Neurontin] 300 mg PO BID 11/15/16 


Hypromellose 0.5% Opth Soln [Artificial Tears] 1 drop OU PRN PRN 11/15/16 


Insulin Glargine,Hum.rec.anlog [Lantus Solostar PEN -] 36 units SQ DAILY 11/15/

16 


Latanoprost 0.005% Eye Drops [Xalatan 0.005% Eye Drops -] 1 drop OU HS 11/15/16 


Meclizine HCl [Antivert -] 12.5 mg PO PRN PRN 11/15/16 


Multivitamins [Multivit ([facility] Formulary)] 1 tab PO DAILY 11/15/16 


Pantoprazole Sodium [Protonix] 40 mg PO DAILY 11/15/16 


Acetaminophen [Tylenol Extra Strength] 500 mg PO PRN PRN #0  11/16/16 


Ammonium Lactate Cream [Lac-Hydrin 12% Cream -] 0.01 unit TP DAILY 03/31/17 


Bacitracin - [Bacitracin Topical Ointment -] 1 applic TP DAILY 03/31/17 


Duloxetine HCl [Cymbalta] 30 mg PO DAILY 03/31/17 


Hydrocodone/Acetaminophen [Hydrocodon-Acetaminoph 7.5-325] 0 each PO Q6H MDD 4 

03/31/17 


Lactulose [Cephulac -] 30 ml PO DAILY 03/31/17 


Linagliptin [Tradjenta] 5 mg PO DAILY 03/31/17 


Sodium Polystyrene Sulfonate 15 gm PO DAILY 03/31/17 


Synalar Ts 0.01% Kit 0.01 drop TP DAILY 03/31/17 











Family Disease History





- Family Disease History


Family History: Denies





Review of Systems





- Review of Systems


Constitutional: reports: No Symptoms


Eyes: reports: No Symptoms


HENT: reports: No Symptoms


Neck: reports: No Symptoms


Cardiovascular: reports: No Symptoms


Respiratory: reports: No Symptoms


Gastrointestinal: reports: No Symptoms


Genitourinary: reports: No Symptoms


Musculoskeletal: reports: No Symptoms


Integumentary: reports: No Symptoms


Neurological: reports: No Symptoms


Endocrine: reports: No Symptoms


Hematology/Lymphatic: reports: No Symptoms


Psychiatric: reports: No Symptoms





Physical Exam


Vital Signs: 


 Vital Signs











Temperature  97.0 F L  04/04/17 10:00


 


Pulse Rate  76   04/04/17 10:00


 


Respiratory Rate  18   04/04/17 10:00


 


Blood Pressure  155/88   04/04/17 10:00


 


O2 Sat by Pulse Oximetry (%)  98   04/03/17 21:00











Constitutional: Yes: Calm


Eyes: Yes: Conjunctiva Clear


HENT: Yes: Atraumatic


Neck: Yes: Supple


Cardiovascular: Yes: S1, S2


Respiratory: Yes: CTA Bilaterally


Gastrointestinal: Yes: Normal Bowel Sounds, Soft


Renal/: Yes: WNL


Musculoskeletal: Yes: WNL


Edema: No


Neurological: Yes: Oriented


Psychiatric: Yes: Oriented


Labs: 


 CBC, BMP





 04/04/17 12:45 





 04/04/17 05:35 





 Laboratory Tests











  03/31/17 03/31/17 04/01/17





  10:43 23:40 06:07


 


WBC   


 


Hgb   


 


MPV   


 


Creatinine  2.9 H   2.4 H


 


Urine Color   Straw 


 


Urine Appearance   Clear 


 


Urine pH   6.0 


 


Ur Specific Gravity   1.005 


 


Urine Protein   Negative 


 


Urine Glucose (UA)   3+ H 


 


Urine Ketones   Negative 


 


Urine Blood   Negative 


 


Urine Nitrite   Negative 


 


Urine Bilirubin   Negative 


 


Urine Urobilinogen   Negative 


 


Ur Leukocyte Esterase   Negative 














  04/02/17 04/03/17 04/03/17





  06:02 06:00 15:15


 


WBC   


 


Hgb   


 


MPV   


 


Creatinine  2.5 H  2.9 H  3.3 H


 


Urine Color   


 


Urine Appearance   


 


Urine pH   


 


Ur Specific Gravity   


 


Urine Protein   


 


Urine Glucose (UA)   


 


Urine Ketones   


 


Urine Blood   


 


Urine Nitrite   


 


Urine Bilirubin   


 


Urine Urobilinogen   


 


Ur Leukocyte Esterase   














  04/04/17 04/04/17





  05:35 12:45


 


WBC   6.4


 


Hgb   14.1


 


MPV   10.2


 


Creatinine  3.3 H 


 


Urine Color  


 


Urine Appearance  


 


Urine pH  


 


Ur Specific Gravity  


 


Urine Protein  


 


Urine Glucose (UA)  


 


Urine Ketones  


 


Urine Blood  


 


Urine Nitrite  


 


Urine Bilirubin  


 


Urine Urobilinogen  


 


Ur Leukocyte Esterase  














Problem List





- Problems


(1) CKD (chronic kidney disease)


Code(s): N18.9 - CHRONIC KIDNEY DISEASE, UNSPECIFIED   





(2) Diabetes mellitus


Code(s): E11.9 - TYPE 2 DIABETES MELLITUS WITHOUT COMPLICATIONS   Qualifiers: 


     Diabetes mellitus type: type 2     Diabetes mellitus complication status: 

without complication     Diabetes mellitus long term insulin use: without long 

term use        Qualified Code(s): E11.9 - Type 2 diabetes mellitus without 

complications  





(3) HTN (hypertension)


Code(s): I10 - ESSENTIAL (PRIMARY) HYPERTENSION   Qualifiers: 


     Hypertension type: essential hypertension        Qualified Code(s): I10 - 

Essential (primary) hypertension  








Assessment/Plan


 Current Medications











Generic Name Dose Route Start Last Admin





  Trade Name Freq  PRN Reason Stop Dose Admin


 


Acetaminophen  500 mg 04/01/17 00:50 04/02/17 12:40





  Tylenol -  PO   500 mg





  Q6H PRN   Administration





  PAIN   


 


Amlodipine Besylate  10 mg 04/01/17 10:00 04/04/17 10:01





  Norvasc -  PO   10 mg





  DAILY [facility]   Administration


 


Aspirin  81 mg 04/01/17 10:00 04/04/17 10:01





  Asa -  PO   81 mg





  DAILY [facility]   Administration


 


Bacitracin  1 applic 04/01/17 10:00 04/04/17 10:02





  Bacitracin -  TP   1 applic





  DAILY [facility]   Administration


 


Calcitriol  0.25 mcg 04/01/17 10:00 04/04/17 10:03





  Rocaltrol -  PO   0.25 mcg





  DAILY [facility]   Administration


 


Carvedilol  12.5 mg 04/02/17 09:59 04/04/17 10:01





  Coreg -  PO   12.5 mg





  BID [facility]   Administration


 


Clonidine  0.1 mg 04/02/17 10:00 04/04/17 10:02





  Catapres -  PO   0.1 mg





  DAILY [facility]   Administration


 


Duloxetine HCl  30 mg 04/01/17 10:00 04/04/17 10:01





  Cymbalta -  PO   30 mg





  DAILY [facility]   Administration


 


Furosemide  40 mg 04/01/17 10:00 04/04/17 10:02





  Lasix -  PO   40 mg





  DAILY [facility]   Administration


 


Gabapentin  300 mg 04/01/17 10:00 04/04/17 10:01





  Neurontin -  PO   300 mg





  BID [facility]   Administration


 


Heparin Sodium (Porcine)  5,000 unit 04/01/17 10:00 04/04/17 10:02





  Heparin -  SQ   5,000 unit





  BID [facility]   Administration


 


Insulin Aspart  0 vial 03/31/17 22:00 04/04/17 12:32





  Novolog Vial Sliding Scale -  SQ   4 units





  [facility]   Administration





  Protocol   


 


Insulin Detemir  10 units 04/03/17 22:00 04/03/17 22:27





  Levemir Vial  SQ   10 units





  HS [facility]   Administration


 


Latanoprost  1 drop 04/01/17 22:00 04/03/17 22:27





  Xalatan 0.005% Eye Drops -  OU   1 drp





  HS [facility]   Administration


 


Multivitamins/Minerals/Vitamin C  1 tab 04/01/17 10:00 04/04/17 10:01





  Tab-A-Vit -  PO   1 tab





  DAILY [facility]   Administration


 


Pantoprazole Sodium  40 mg 04/01/17 10:00 04/04/17 10:02





  Protonix -  PO   40 mg





  DAILY VICKIE   Administration








Impression


1. CKD


2. HTN


3. hyperlipidemia


4. r/o acs


5. DM





Plan


- unclear what pts baseline renal function is


- can monitor for now and if stable will see as outpt


- check bladder ultrasound


- cont current meds


- can hold lasix tomorrow until labs reviewed


- put a call out to PMD


- unclear what liver status is, he says he has a history of etoh abuse many 

years ago

## 2017-04-04 NOTE — EKG
Test Reason : 

Blood Pressure : ***/*** mmHG

Vent. Rate : 056 BPM     Atrial Rate : 056 BPM

   P-R Int : 136 ms          QRS Dur : 080 ms

    QT Int : 436 ms       P-R-T Axes : 018 008 081 degrees

   QTc Int : 420 ms

 

SINUS BRADYCARDIA

WHEN COMPARED WITH ECG OF 01-APR-2017 02:53,

T WAVE VARIATION

Confirmed by LISSETH LLANOS MD ([id number]) on 4/4/2017 5:40:01 PM

 

Referred By: LISSETH LLANOS           Confirmed By:LISSETH LLANOS MD

## 2017-04-04 NOTE — PN
Progress Note (short form)





- Note


Progress Note: 


Chief Complaint: Events noted, notes reviewed, continues to report vague left 

sided chest pain, intermittent, denies any dyspnea 





History of Present Illness: 


Seen and examined on telemetry. Events noted, notes reviewed, continues to 

report vague left sided chest pain, intermittent, denies any dyspnea 





Echocardiography revealed normal LV size and function, with mild TR





MPI study revealed inferior attenuation with normal LV function, LV EF of 67%





- Current Medication List





 Current Medications





Acetaminophen (Tylenol -)  500 mg PO Q6H PRN


   PRN Reason: PAIN


   Last Admin: 04/02/17 12:40 Dose:  500 mg


Amlodipine Besylate (Norvasc -)  10 mg PO DAILY [facility]


   Last Admin: 04/03/17 14:00 Dose:  Not Given


Aspirin (Asa -)  81 mg PO DAILY [facility]


   Last Admin: 04/03/17 13:01 Dose:  81 mg


Bacitracin (Bacitracin -)  1 applic TP DAILY [facility]


   Last Admin: 04/03/17 13:01 Dose:  1 applic


Calcitriol (Rocaltrol -)  0.25 mcg PO DAILY [facility]


   Last Admin: 04/03/17 13:00 Dose:  0.25 mcg


Carvedilol (Coreg -)  12.5 mg PO BID [facility]


   Last Admin: 04/03/17 22:19 Dose:  12.5 mg


Clonidine (Catapres -)  0.1 mg PO DAILY [facility]


   Last Admin: 04/03/17 17:48 Dose:  Not Given


Duloxetine HCl (Cymbalta -)  30 mg PO DAILY [facility]


   Last Admin: 04/03/17 13:01 Dose:  30 mg


Furosemide (Lasix -)  40 mg PO DAILY [facility]


   Last Admin: 04/03/17 14:00 Dose:  Not Given


Gabapentin (Neurontin -)  300 mg PO BID [facility]


   Last Admin: 04/03/17 22:20 Dose:  300 mg


Heparin Sodium (Porcine) (Heparin -)  5,000 unit SQ BID [facility]


   Last Admin: 04/03/17 22:19 Dose:  5,000 unit


Insulin Aspart (Novolog Vial Sliding Scale -)  0 vial SQ ACHS [facility]


   PRN Reason: Protocol


   Last Admin: 04/04/17 06:03 Dose:  6 units


Insulin Detemir (Levemir Vial)  10 units SQ HS [facility]


   Last Admin: 04/03/17 22:27 Dose:  10 units


Latanoprost (Xalatan 0.005% Eye Drops -)  1 drop OU HS [facility]


   Last Admin: 04/03/17 22:27 Dose:  1 drp


Multivitamins/Minerals/Vitamin C (Tab-A-Vit -)  1 tab PO DAILY [facility]


   Last Admin: 04/03/17 12:59 Dose:  1 tab


Pantoprazole Sodium (Protonix -)  40 mg PO DAILY [facility]


   Last Admin: 04/03/17 12:59 Dose:  40 mg





- Review of Systems


Cardiovascular: As noted above


Respiratory: denies: Cough or Sputum Production


Gastrointestinal: denies: Nausea, Vomiting, Diarrhea, Constipation or Abdominal 

Pain  


Musculoskeletal:  No symptoms reported 


Neurological: Foot drop and spinal stenosis 





- Objective


Vital Signs: 





 Last Vital Signs











Temp Pulse Resp BP Pulse Ox


 


 98 F   72   18   112/54   98 


 


 04/04/17 05:52  04/04/17 05:52  04/04/17 05:52  04/04/17 05:52  04/03/17 21:00











Neck: Supple Negative JVD 


Cardiovascular: S1 S2 Regular Rate Rhythm 


Respiratory: Clear to A&P


Gastrointestinal: Soft Benign Normal Bowel Sounds


Ext: Negative Edema





Labs: 





 CBC, BMP





 04/02/17 06:02 





 04/04/17 05:35 





 INR, PTT











INR  0.97  (0.82-1.09)   03/31/17  10:43    











Assessment/Plan





ASSESSMENT:





1. Chest pain syndrome, CAD angina pectoris with borderline Troponin I 

elevation not indicative of ACS clinically, negative MPI study for medical 

management


2. Diastolic LV dysfunction with class 0-I NYHA classification LV failure


3. HTN


4. IDDM


5. Hypercholesterolemia


6. Cervical Spinal Stenosis


7. CKD 


8. Neuropathy 





PLAN:





1. Continue Coreg 


2. Continue Norvasc and Clonidine


3. Ideally should be on ACEI or ARBS unless contraindicated, pending renal 

function improvement and stabilization, can be done as outpatient


4. Continue PO Lasix


5. Continue ASA


6. May be D/C home from cardiovascular point of view














Debbie Bartholomew MD

## 2017-04-05 LAB
ANION GAP SERPL CALC-SCNC: 11 MMOL/L (ref 8–16)
CALCIUM SERPL-MCNC: 8.3 MG/DL (ref 8.5–10.1)
CO2 SERPL-SCNC: 24 MMOL/L (ref 21–32)
CREAT SERPL-MCNC: 3.1 MG/DL (ref 0.7–1.3)
GLUCOSE SERPL-MCNC: 326 MG/DL (ref 74–106)

## 2017-04-05 RX ADMIN — INSULIN DETEMIR SCH UNITS: 100 INJECTION, SOLUTION SUBCUTANEOUS at 06:49

## 2017-04-05 RX ADMIN — CARVEDILOL SCH MG: 12.5 TABLET, FILM COATED ORAL at 22:07

## 2017-04-05 RX ADMIN — LATANOPROST SCH: 50 SOLUTION OPHTHALMIC at 22:08

## 2017-04-05 RX ADMIN — BACITRACIN ZINC SCH APPLIC: 500 OINTMENT TOPICAL at 09:42

## 2017-04-05 RX ADMIN — LATANOPROST SCH: 50 SOLUTION OPHTHALMIC at 22:17

## 2017-04-05 RX ADMIN — INSULIN ASPART SCH: 100 INJECTION, SOLUTION INTRAVENOUS; SUBCUTANEOUS at 16:42

## 2017-04-05 RX ADMIN — ASPIRIN 81 MG SCH MG: 81 TABLET ORAL at 09:42

## 2017-04-05 RX ADMIN — PANTOPRAZOLE SODIUM SCH MG: 40 TABLET, DELAYED RELEASE ORAL at 09:42

## 2017-04-05 RX ADMIN — CARVEDILOL SCH MG: 12.5 TABLET, FILM COATED ORAL at 09:42

## 2017-04-05 RX ADMIN — INSULIN ASPART SCH UNITS: 100 INJECTION, SOLUTION INTRAVENOUS; SUBCUTANEOUS at 06:49

## 2017-04-05 RX ADMIN — DULOXETINE SCH MG: 30 CAPSULE, DELAYED RELEASE ORAL at 09:42

## 2017-04-05 RX ADMIN — HEPARIN SODIUM SCH UNIT: 5000 INJECTION, SOLUTION INTRAVENOUS; SUBCUTANEOUS at 22:07

## 2017-04-05 RX ADMIN — CALCITRIOL SCH MCG: 0.25 CAPSULE ORAL at 09:43

## 2017-04-05 RX ADMIN — HEPARIN SODIUM SCH UNIT: 5000 INJECTION, SOLUTION INTRAVENOUS; SUBCUTANEOUS at 09:43

## 2017-04-05 RX ADMIN — INSULIN ASPART SCH UNITS: 100 INJECTION, SOLUTION INTRAVENOUS; SUBCUTANEOUS at 12:12

## 2017-04-05 RX ADMIN — GABAPENTIN SCH MG: 300 CAPSULE ORAL at 22:07

## 2017-04-05 RX ADMIN — INSULIN ASPART SCH UNITS: 100 INJECTION, SOLUTION INTRAVENOUS; SUBCUTANEOUS at 22:08

## 2017-04-05 RX ADMIN — MULTIVITAMIN TABLET SCH TAB: TABLET at 09:42

## 2017-04-05 RX ADMIN — INSULIN DETEMIR SCH UNITS: 100 INJECTION, SOLUTION SUBCUTANEOUS at 22:07

## 2017-04-05 RX ADMIN — GABAPENTIN SCH MG: 300 CAPSULE ORAL at 09:42

## 2017-04-05 NOTE — PN
Progress Note, Physician


Chief Complaint: 


Events noted


Denies chest pain


History of Present Illness: 


Patient was seen and examined. Awake and alert. Chart was reviewed


Denies chest pain, SOB or palpitations


Echocardiography and nuclear myocardial perfusion imaging result noted.


Orthostasis yesterday and medication was adjusted





- Current Medication List


Current Medications: 


Active Medications





Acetaminophen (Tylenol -)  500 mg PO Q6H PRN


   PRN Reason: PAIN


   Last Admin: 04/02/17 12:40 Dose:  500 mg


Amlodipine Besylate (Norvasc -)  5 mg PO DAILY [facility]


Aspirin (Asa -)  81 mg PO DAILY [facility]


   Last Admin: 04/04/17 10:01 Dose:  81 mg


Bacitracin (Bacitracin -)  1 applic TP DAILY [facility]


   Last Admin: 04/04/17 10:02 Dose:  1 applic


Calcitriol (Rocaltrol -)  0.25 mcg PO DAILY [facility]


   Last Admin: 04/04/17 10:03 Dose:  0.25 mcg


Carvedilol (Coreg -)  12.5 mg PO BID [facility]


   Last Admin: 04/04/17 22:15 Dose:  12.5 mg


Duloxetine HCl (Cymbalta -)  30 mg PO DAILY [facility]


   Last Admin: 04/04/17 10:01 Dose:  30 mg


Furosemide (Lasix -)  40 mg PO DAILY [facility]


   Last Admin: 04/04/17 10:02 Dose:  40 mg


Gabapentin (Neurontin -)  300 mg PO BID [facility]


   Last Admin: 04/04/17 22:15 Dose:  300 mg


Heparin Sodium (Porcine) (Heparin -)  5,000 unit SQ BID [facility]


   Last Admin: 04/04/17 22:16 Dose:  5,000 unit


Insulin Aspart (Novolog Vial Sliding Scale -)  0 vial SQ ACHS [facility]


   PRN Reason: Protocol


   Last Admin: 04/05/17 06:49 Dose:  6 units


Insulin Detemir (Levemir Vial)  10 units SQ BID@0700,2200 [facility]


   Last Admin: 04/05/17 06:49 Dose:  10 units


Latanoprost (Xalatan 0.005% Eye Drops -)  1 drop OU HS [facility]


   Last Admin: 04/04/17 23:45 Dose:  Not Given


Multivitamins/Minerals/Vitamin C (Tab-A-Vit -)  1 tab PO DAILY [facility]


   Last Admin: 04/04/17 10:01 Dose:  1 tab


Pantoprazole Sodium (Protonix -)  40 mg PO DAILY [facility]


   Last Admin: 04/04/17 10:02 Dose:  40 mg











- Objective


Vital Signs: 


 Vital Signs











Temperature  98.1 F   04/05/17 06:00


 


Pulse Rate  64   04/05/17 06:00


 


Respiratory Rate  18   04/05/17 06:00


 


Blood Pressure  136/75   04/05/17 06:00


 


O2 Sat by Pulse Oximetry (%)  94 L  04/04/17 21:00











Neck: Yes: Supple


Cardiovascular: Yes: Regular Rate and Rhythm, S1, S2


Respiratory: Yes: CTA Bilaterally


Gastrointestinal: Yes: Normal Bowel Sounds, Soft.  No: Tenderness


Edema: No


Additional Findings/Remarks: 








- Review of Systems


Cardiovascular: As noted above


Respiratory: denies: Cough or Sputum Production


Gastrointestinal: denies: Nausea, Vomiting, Diarrhea, Constipation or Abdominal 

Pain  


Musculoskeletal:  No symptoms reported 


Neurological: Foot drop and spinal stenosis 











Labs: 


 CBC, BMP





 04/04/17 12:45 





 INR, PTT











INR  0.97  (0.82-1.09)   03/31/17  10:43    














Problem List





- Problems


(1) CKD (chronic kidney disease)


Code(s): N18.9 - CHRONIC KIDNEY DISEASE, UNSPECIFIED   





(2) Chest pain


Code(s): R07.9 - CHEST PAIN, UNSPECIFIED   Qualifiers: 


     Chest pain type: unspecified        Qualified Code(s): R07.9 - Chest pain, 

unspecified  





(3) Diabetes mellitus


Code(s): E11.9 - TYPE 2 DIABETES MELLITUS WITHOUT COMPLICATIONS   Qualifiers: 


     Diabetes mellitus type: type 2     Diabetes mellitus complication status: 

without complication     Diabetes mellitus long term insulin use: without long 

term use        Qualified Code(s): E11.9 - Type 2 diabetes mellitus without 

complications  





(4) HTN (hypertension)


Code(s): I10 - ESSENTIAL (PRIMARY) HYPERTENSION   Qualifiers: 


     Hypertension type: essential hypertension        Qualified Code(s): I10 - 

Essential (primary) hypertension  





(5) Neuropathy due to type 2 diabetes mellitus


Code(s): E11.40 - TYPE 2 DIABETES MELLITUS WITH DIABETIC NEUROPATHY, UNSP








Assessment/Plan


1. Chest pain syndrome, CAD angina pectoris with borderline Troponin I 

elevation not indicative of ACS clinically


2. Diastolic LV dysfunction with class 0-I NYHA classification LV failure


3. HTN


4. IDDM


5. Hypercholesterolemia


6. Cervical Spinal Stenosis


7. CKD 


8. Neuropathy 


9. Orthostasis





PLAN:


1. Continue Coreg and titrate dosage as tolerated and continue Norvasc at a 

lower dose. Clonidine was stopped


2. Ideally should be on ACEI or ARB unless contraindicated, pending renal 

function improvement and stabilization and if BP permits


3. Continue PO Lasix as tolerated


4. Continue ASA


5. Cardiac testing result reviewed





Further plans are to follow. If not orthostatic, discharge planning


Mejia Nunez MD

## 2017-04-05 NOTE — PN
Progress Note, Physician


History of Present Illness: 


Pt seen and examined at bedside. He is awake and alert. He denies shortness of 

breath. 





- Current Medication List


Current Medications: 


Active Medications





Acetaminophen (Tylenol -)  500 mg PO Q6H PRN


   PRN Reason: PAIN


   Last Admin: 04/02/17 12:40 Dose:  500 mg


Amlodipine Besylate (Norvasc -)  5 mg PO DAILY [facility]


   Last Admin: 04/05/17 09:42 Dose:  5 mg


Aspirin (Asa -)  81 mg PO DAILY [facility]


   Last Admin: 04/05/17 09:42 Dose:  81 mg


Bacitracin (Bacitracin -)  1 applic TP DAILY [facility]


   Last Admin: 04/05/17 09:42 Dose:  1 applic


Calcitriol (Rocaltrol -)  0.25 mcg PO DAILY [facility]


   Last Admin: 04/05/17 09:43 Dose:  0.25 mcg


Carvedilol (Coreg -)  12.5 mg PO BID [facility]


   Last Admin: 04/05/17 09:42 Dose:  12.5 mg


Duloxetine HCl (Cymbalta -)  30 mg PO DAILY [facility]


   Last Admin: 04/05/17 09:42 Dose:  30 mg


Furosemide (Lasix -)  40 mg PO DAILY [facility]


   Last Admin: 04/04/17 10:02 Dose:  40 mg


Gabapentin (Neurontin -)  300 mg PO BID [facility]


   Last Admin: 04/05/17 09:42 Dose:  300 mg


Heparin Sodium (Porcine) (Heparin -)  5,000 unit SQ BID [facility]


   Last Admin: 04/05/17 09:43 Dose:  5,000 unit


Insulin Aspart (Novolog Vial Sliding Scale -)  0 vial SQ ACHS [facility]


   PRN Reason: Protocol


   Last Admin: 04/05/17 06:49 Dose:  6 units


Insulin Detemir (Levemir Vial)  10 units SQ BID@0700,2200 [facility]


   Last Admin: 04/05/17 06:49 Dose:  10 units


Latanoprost (Xalatan 0.005% Eye Drops -)  1 drop OU HS [facility]


   Last Admin: 04/04/17 23:45 Dose:  Not Given


Multivitamins/Minerals/Vitamin C (Tab-A-Vit -)  1 tab PO DAILY [facility]


   Last Admin: 04/05/17 09:42 Dose:  1 tab


Pantoprazole Sodium (Protonix -)  40 mg PO DAILY [facility]


   Last Admin: 04/05/17 09:42 Dose:  40 mg











- Objective


Vital Signs: 


 Vital Signs











Temperature  98.1 F   04/05/17 06:00


 


Pulse Rate  64   04/05/17 06:00


 


Respiratory Rate  18   04/05/17 06:00


 


Blood Pressure  136/75   04/05/17 06:00


 


O2 Sat by Pulse Oximetry (%)  94 L  04/04/17 21:00











Constitutional: Yes: Calm


Eyes: Yes: WNL


Cardiovascular: Yes: S1, S2


Respiratory: Yes: CTA Bilaterally


Gastrointestinal: Yes: Soft


Genitourinary: Yes: WNL


Musculoskeletal: Yes: WNL


Extremities: Yes: WNL


Edema: No


Neurological: Yes: Oriented


Psychiatric: Yes: Oriented


Labs: 


 CBC, BMP





 04/04/17 12:45 





 04/05/17 05:35 





 INR, PTT











INR  0.97  (0.82-1.09)   03/31/17  10:43    














Problem List





- Problems


(1) CKD (chronic kidney disease)


Code(s): N18.9 - CHRONIC KIDNEY DISEASE, UNSPECIFIED   





(2) Diabetes mellitus


Code(s): E11.9 - TYPE 2 DIABETES MELLITUS WITHOUT COMPLICATIONS   Qualifiers: 


     Diabetes mellitus type: type 2     Diabetes mellitus complication status: 

without complication     Diabetes mellitus long term insulin use: without long 

term use        Qualified Code(s): E11.9 - Type 2 diabetes mellitus without 

complications  





(3) HTN (hypertension)


Code(s): I10 - ESSENTIAL (PRIMARY) HYPERTENSION   Qualifiers: 


     Hypertension type: essential hypertension        Qualified Code(s): I10 - 

Essential (primary) hypertension  








Assessment/Plan


 Current Medications











Generic Name Dose Route Start Last Admin





  Trade Name Freq  PRN Reason Stop Dose Admin


 


Acetaminophen  500 mg 04/01/17 00:50 04/02/17 12:40





  Tylenol -  PO   500 mg





  Q6H PRN   Administration





  PAIN   


 


Amlodipine Besylate  5 mg 04/05/17 10:00 04/05/17 09:42





  Norvasc -  PO   5 mg





  DAILY VICKIE   Administration


 


Aspirin  81 mg 04/01/17 10:00 04/05/17 09:42





  Asa -  PO   81 mg





  DAILY VICKIE   Administration


 


Bacitracin  1 applic 04/01/17 10:00 04/05/17 09:42





  Bacitracin -  TP   1 applic





  DAILY VICKIE   Administration


 


Calcitriol  0.25 mcg 04/01/17 10:00 04/05/17 09:43





  Rocaltrol -  PO   0.25 mcg





  DAILY VICKIE   Administration


 


Carvedilol  12.5 mg 04/02/17 09:59 04/05/17 09:42





  Coreg -  PO   12.5 mg





  BID VICKIE   Administration


 


Duloxetine HCl  30 mg 04/01/17 10:00 04/05/17 09:42





  Cymbalta -  PO   30 mg





  DAILY VICKIE   Administration


 


Furosemide  40 mg 04/01/17 10:00 04/04/17 10:02





  Lasix -  PO   40 mg





  DAILY VICKIE   Administration


 


Gabapentin  300 mg 04/01/17 10:00 04/05/17 09:42





  Neurontin -  PO   300 mg





  BID [facility]ICK[facility]   Administration


 


Heparin Sodium (Porcine)  5,000 unit 04/01/17 10:00 04/05/17 09:43





  Heparin -  SQ   5,000 unit





  BID [facility]ICKIE   Administration


 


Insulin Aspart  0 vial 03/31/17 22:00 04/05/17 12:12





  Novolog Vial Sliding Scale -  SQ   8 units





  [facility]   Administration





  Protocol   


 


Insulin Detemir  10 units 04/04/17 22:00 04/05/17 06:49





  Levemir Vial  SQ   10 units





  BID@0700,2200 [facility]   Administration


 


Latanoprost  1 drop 04/01/17 22:00 04/04/17 23:45





  Xalatan 0.005% Eye Drops -  OU   Not Given





  HS [facility]   


 


Multivitamins/Minerals/Vitamin C  1 tab 04/01/17 10:00 04/05/17 09:42





  Tab-A-Vit -  PO   1 tab





  DAILY VICKIE   Administration


 


Pantoprazole Sodium  40 mg 04/01/17 10:00 04/05/17 09:42





  Protonix -  PO   40 mg





  DAILY VICKIE   Administration











Impression


1. CKD


2. HTN


3. hyperlipidemia


4. r/o acs


5. DM





Plan


- pt has baseline creatinine of about 2.5


- renal function is better today


- will need outpt follow up


- resume lasix


- renal ultrasound reviewed, consistent with CKD


- cardio input appreciated


Dr Welsh

## 2017-04-05 NOTE — PN
Physical Exam: 


SUBJECTIVE: Patient seen and examined. Pt reports feeling much better. Denies 

dizziness upon standing. Reports still with some left side/rib pain at times.








OBJECTIVE:


Vital Signs - 24 hr





 3





  17





  16:16 17:00 17:13


 


Temperature 98.2 F 97.7 F 


 


Pulse Rate 72 63 


 


Pulse Rate [   74





Left side   





Sitting]   


 


Pulse Rate [   76





Left side   





Standing]   


 


Pulse Rate [   78





Left side   





Supine]   


 


Respiratory 20 18 





Rate   


 


Blood Pressure 130/81 120/70 


 


Blood Pressure   124/83





[Left side   





Sitting]   


 


Blood Pressure   79/51





[Left side   





Standing]   


 


Blood Pressure   137/74





[Left side   





Supine]   


 


O2 Sat by Pulse   





Oximetry (%)   








 3





  17





  20:00 22:00 02:00 06:00 14:54


 


Temperature 97.2 F L 97.7 F 97.2 98.1 97.8 F


 


Pulse Rate 70 62 69 64 65


 


Pulse Rate [     





Left side     





Sitting]     


 


Pulse Rate [     





Left side     





Standing]     


 


Pulse Rate [     





Left side     





Supine]     


 


Respiratory 20 18   20





Rate   18 18 


 


Blood Pressure 92/65 112/65 100/54 136/75 138/60


 


Blood Pressure     





[Left side     





Sitting]     


 


Blood Pressure     





[Left side     





Standing]     


 


Blood Pressure     





[Left side     





Supine]     


 


O2 Sat by Pulse     





Oximetry (%)     








Orthostatics: 4PM


layin/77 P62


sitting 135/66 P67


Standin/40 P72





GENERAL: The patient is awake, alert, and fully oriented, in no acute distress.


HEAD: Normal with no signs of trauma.


EYES: PERRL, extraocular movements intact, sclera anicteric, conjunctiva clear. 

No ptosis. 


ENT: Ears normal, nares patent, oropharynx clear without exudates, moist mucous 


membranes.


NECK: Trachea midline, full range of motion, supple. 


LUNGS: Breath sounds equal, clear to auscultation bilaterally, no wheezes, no 

crackles, no 


accessory muscle use. 


HEART: Regular rate and rhythm, S1, S2 without murmur, rub or gallop.


ABDOMEN: Soft, nontender, nondistended, normoactive bowel sounds, no guarding, 

no 


rebound, no hepatosplenomegaly, no masses.


EXTREMITIES: 2+ pulses, warm, well-perfused, no edema. 


NEUROLOGICAL: Cranial nerves II through XII grossly intact. Normal speech, gait 

not 


observed.


PSYCH: Normal mood, normal affect.


SKIN: Warm, dry, normal turgor, no rashes or lesions noted





Laboratory Results - last 24 hr





 3





  17





  11:35 17:31 22:14


 


Sodium   


 


Potassium   


 


Chloride   


 


Carbon Dioxide   


 


Anion Gap   


 


BUN   


 


Creatinine   


 


POC Glucometer   299  226


 


Random Glucose   


 


Calcium   


 


Ammonia  < 9 L  


 


Urine Color   


 


Urine Appearance   


 


Urine pH   


 


Ur Specific Gravity   


 


Urine Protein   


 


Urine Glucose (UA)   


 


Urine Ketones   


 


Urine Blood   


 


Urine Nitrite   


 


Urine Bilirubin   


 


Urine Urobilinogen   


 


Ur Leukocyte Esterase   








 3





  17





  23:01 05:35 05:59 11:59


 


Sodium   134 L  


 


Potassium   4.4  


 


Chloride   99  


 


Carbon Dioxide   24  


 


Anion Gap   11  


 


BUN   50 H  


 


Creatinine   3.1 H  


 


POC Glucometer    282  321


 


Random Glucose   326 H* D  


 


Calcium   8.3 L  


 


Ammonia    


 


Urine Color  Straw   


 


Urine Appearance  Clear   


 


Urine pH  5.0   


 


Ur Specific Gravity  1.004   


 


Urine Protein  Negative   


 


Urine Glucose (UA)  1+ H   


 


Urine Ketones  Negative   


 


Urine Blood  Negative   


 


Urine Nitrite  Negative   


 


Urine Bilirubin  Negative   


 


Urine Urobilinogen  Negative   


 


Ur Leukocyte Esterase  Negative   








Active Medications





 3





Generic Name Dose Route Start Last Admin





  Trade Name Freq  PRN Reason Stop Dose Admin


 


Acetaminophen  500 mg 17 00:50 17 12:40





  Tylenol -  PO   500 mg





  Q6H PRN   Administration





  PAIN   


 


Amlodipine Besylate  5 mg 17 10:00 17 09:42





  Norvasc -  PO   5 mg





  DAILY VICKIE   Administration


 


Aspirin  81 mg 17 10:00 17 09:42





  Asa -  PO   81 mg





  DAILY VICKIE   Administration


 


Bacitracin  1 applic 17 10:00 17 09:42





  Bacitracin -  TP   1 applic





  DAILY VICKIE   Administration


 


Calcitriol  0.25 mcg 17 10:00 17 09:43





  Rocaltrol -  PO   0.25 mcg





  DAILY VICKIE   Administration


 


Carvedilol  12.5 mg 17 09:59 17 09:42





  Coreg -  PO   12.5 mg





  BID [facility]IE   Administration


 


Duloxetine HCl  30 mg 17 10:00 17 09:42





  Cymbalta -  PO   30 mg





  DAILY VICKIE   Administration


 


Furosemide  40 mg 17 10:00 17 10:02





  Lasix -  PO   40 mg





  DAILY VICKIE   Administration


 


Gabapentin  300 mg 17 10:00 17 09:42





  Neurontin -  PO   300 mg





  BID [facility]   Administration


 


Heparin Sodium (Porcine)  5,000 unit 17 10:00 17 09:43





  Heparin -  SQ   5,000 unit





  BID [facility]   Administration


 


Insulin Aspart  0 vial 17 22:00 17 12:12





  Novolog Vial Sliding Scale -  SQ   8 units





  [facility]   Administration





  Protocol   


 


Insulin Detemir  10 units 17 22:00 17 06:49





  Levemir Vial  SQ   10 units





  BID@0700,2200 [facility]   Administration


 


Latanoprost  1 drop 17 22:00 17 23:45





  Xalatan 0.005% Eye Drops -  OU   Not Given





  HS [facility]   


 


Multivitamins/Minerals/Vitamin C  1 tab 17 10:00 17 09:42





  Tab-A-Vit -  PO   1 tab





  DAILY VICKIE   Administration


 


Pantoprazole Sodium  40 mg 17 10:00 17 09:42





  Protonix -  PO   40 mg





  DAILY VICKIE   Administration











ASSESSMENT/PLAN:


This is a 61 year old male with PMHx of DM, HTN, CKD (RTA4), cervical spinal 

stenosis, neuropathy, R- Foot Drop, atrophy, depression who presented to the ED 

after being found unresponsive with a BGM of 30 (responded to D10). He also 

reported chest pain and thus was admitted for further evaluation. 





Orthostatic hypotension


   - clonidine DC yesterday, norvasc decreased to 5 yesterday


   - remains orthostatic despite medication changes yesterday. Will dc norvasc


   - repeat orthostatics in AM. 


   - cardiology consulted, aware of findings and in agreement with plan.





DM


   - hypoglycemic episode resolved


   - blood sugars remain in 200s, will increase levemir to 12u BID


   - cont to monitor BGM


   - cont novolog SS





chest pain


   - ACS ruled out, stress test grossly normal


   - cont coreg, ASA





CKD (RTA4) with LUIS ENRIQUE


   - baseline Cr 2.5-2.6 as per nurse at [facility], slight improvement

, renal consult appreciated


   - creatinine trending down off lasix. No s/s fluid overload. COnt to hold 

lasix


   - renal sono c/w CKD


   - will need outpatient renal f/u upon DC





elevated ammonia level


   - repeat normal, no further testing needed unless change in mental status





Depression


   - cont home meds.





FEN


   - tolerating po


   - monitor BMP in am


   - sodium/diabetic diet





Dispo: Pt continues to require inpatient care. 





Visit type





- Emergency Visit


Emergency Visit: Yes


ED Registration Date: 17


Care time: The patient presented to the Emergency Department on the above date 

and was hospitalized for further evaluation of their emergent condition.





- New Patient


This patient is new to me today: No





- Critical Care


Critical Care patient: No





- Discharge Referral


Referred to [facility] No

## 2017-04-06 LAB
ANION GAP SERPL CALC-SCNC: 13 MMOL/L (ref 8–16)
BASOPHILS # BLD: 0.5 % (ref 0–2)
CALCIUM SERPL-MCNC: 8.7 MG/DL (ref 8.5–10.1)
CO2 SERPL-SCNC: 22 MMOL/L (ref 21–32)
CREAT SERPL-MCNC: 3.2 MG/DL (ref 0.7–1.3)
DEPRECATED RDW RBC AUTO: 13.9 % (ref 11.9–15.9)
EOSINOPHIL # BLD: 3.5 % (ref 0–4.5)
GLUCOSE SERPL-MCNC: 267 MG/DL (ref 74–106)
MAGNESIUM SERPL-MCNC: 2.3 MG/DL (ref 1.8–2.4)
MCH RBC QN AUTO: 31.9 PG (ref 25.7–33.7)
MCHC RBC AUTO-ENTMCNC: 33.2 G/DL (ref 32–35.9)
MCV RBC: 96.1 FL (ref 80–96)
NEUTROPHILS # BLD: 67.1 % (ref 42.8–82.8)
PHOSPHATE SERPL-MCNC: 3.2 MG/DL (ref 2.5–4.9)
PLATELET # BLD AUTO: 142 K/MM3 (ref 134–434)
PMV BLD: 10.5 FL (ref 7.5–11.1)
WBC # BLD AUTO: 7.1 K/MM3 (ref 4–10)

## 2017-04-06 RX ADMIN — MULTIVITAMIN TABLET SCH TAB: TABLET at 09:55

## 2017-04-06 RX ADMIN — LATANOPROST SCH DRP: 50 SOLUTION OPHTHALMIC at 22:55

## 2017-04-06 RX ADMIN — INSULIN ASPART SCH: 100 INJECTION, SOLUTION INTRAVENOUS; SUBCUTANEOUS at 18:05

## 2017-04-06 RX ADMIN — PANTOPRAZOLE SODIUM SCH MG: 40 TABLET, DELAYED RELEASE ORAL at 09:54

## 2017-04-06 RX ADMIN — BACITRACIN ZINC SCH APPLIC: 500 OINTMENT TOPICAL at 09:55

## 2017-04-06 RX ADMIN — INSULIN ASPART SCH UNITS: 100 INJECTION, SOLUTION INTRAVENOUS; SUBCUTANEOUS at 12:45

## 2017-04-06 RX ADMIN — CARVEDILOL SCH: 12.5 TABLET, FILM COATED ORAL at 22:48

## 2017-04-06 RX ADMIN — INSULIN DETEMIR SCH UNITS: 100 INJECTION, SOLUTION SUBCUTANEOUS at 22:49

## 2017-04-06 RX ADMIN — DULOXETINE SCH MG: 30 CAPSULE, DELAYED RELEASE ORAL at 09:54

## 2017-04-06 RX ADMIN — GABAPENTIN SCH MG: 300 CAPSULE ORAL at 09:54

## 2017-04-06 RX ADMIN — GABAPENTIN SCH MG: 300 CAPSULE ORAL at 22:49

## 2017-04-06 RX ADMIN — INSULIN DETEMIR SCH UNITS: 100 INJECTION, SOLUTION SUBCUTANEOUS at 06:33

## 2017-04-06 RX ADMIN — INSULIN ASPART SCH UNITS: 100 INJECTION, SOLUTION INTRAVENOUS; SUBCUTANEOUS at 22:49

## 2017-04-06 RX ADMIN — ASPIRIN 81 MG SCH MG: 81 TABLET ORAL at 09:54

## 2017-04-06 RX ADMIN — INSULIN ASPART SCH UNITS: 100 INJECTION, SOLUTION INTRAVENOUS; SUBCUTANEOUS at 06:34

## 2017-04-06 RX ADMIN — HEPARIN SODIUM SCH UNIT: 5000 INJECTION, SOLUTION INTRAVENOUS; SUBCUTANEOUS at 09:55

## 2017-04-06 RX ADMIN — CALCITRIOL SCH MCG: 0.25 CAPSULE ORAL at 09:55

## 2017-04-06 NOTE — PN
Progress Note, Physician


History of Present Illness: 


Pt seen and examined at bedside. He was hypotensive yesterday and BP meds 

adjusted. He is awake and alert. He denies dysuria. 





- Current Medication List


Current Medications: 


Active Medications





Acetaminophen (Tylenol -)  500 mg PO Q6H PRN


   PRN Reason: PAIN


   Last Admin: 04/02/17 12:40 Dose:  500 mg


Aspirin (Asa -)  81 mg PO DAILY [facility]


   Last Admin: 04/06/17 09:54 Dose:  81 mg


Bacitracin (Bacitracin -)  1 applic TP DAILY [facility]


   Last Admin: 04/06/17 09:55 Dose:  1 applic


Calcitriol (Rocaltrol -)  0.25 mcg PO DAILY [facility]


   Last Admin: 04/06/17 09:55 Dose:  0.25 mcg


Carvedilol (Coreg -)  12.5 mg PO BID [facility]


   Last Admin: 04/05/17 22:07 Dose:  12.5 mg


Gabapentin (Neurontin -)  300 mg PO BID [facility]


   Last Admin: 04/06/17 09:54 Dose:  300 mg


Insulin Aspart (Novolog Vial Sliding Scale -)  0 vial SQ ACHS [facility]


   PRN Reason: Protocol


   Last Admin: 04/06/17 12:45 Dose:  6 units


Insulin Detemir (Levemir Vial)  12 units SQ BID@0700,2200 [facility]


   Last Admin: 04/06/17 06:33 Dose:  12 units


Latanoprost (Xalatan 0.005% Eye Drops -)  1 drop OU HS [facility]


   Last Admin: 04/05/17 22:17 Dose:  Not Given


Multivitamins/Minerals/Vitamin C (Tab-A-Vit -)  1 tab PO DAILY [facility]


   Last Admin: 04/06/17 09:55 Dose:  1 tab


Pantoprazole Sodium (Protonix -)  40 mg PO DAILY [facility]


   Last Admin: 04/06/17 09:54 Dose:  40 mg











- Objective


Vital Signs: 


 Vital Signs











Temperature  98 F   04/06/17 05:00


 


Pulse Rate  66   04/06/17 09:23


 


Respiratory Rate  18   04/06/17 05:00


 


Blood Pressure  123/66   04/06/17 09:23


 


O2 Sat by Pulse Oximetry (%)  95   04/05/17 21:00











Constitutional: Yes: Calm


Eyes: Yes: Conjunctiva Clear


HENT: Yes: Atraumatic


Neck: Yes: Supple


Cardiovascular: Yes: S1, S2


Respiratory: Yes: CTA Bilaterally


Gastrointestinal: Yes: Soft


Musculoskeletal: Yes: WNL


Edema: No


Neurological: Yes: Oriented


Psychiatric: Yes: Oriented


Labs: 


 CBC, BMP





 04/06/17 05:50 





 04/06/17 05:50 





 INR, PTT











INR  0.97  (0.82-1.09)   03/31/17  10:43    














Problem List





- Problems


(1) CKD (chronic kidney disease)


Code(s): N18.9 - CHRONIC KIDNEY DISEASE, UNSPECIFIED   





(2) Diabetes mellitus


Code(s): E11.9 - TYPE 2 DIABETES MELLITUS WITHOUT COMPLICATIONS   Qualifiers: 


     Diabetes mellitus type: type 2     Diabetes mellitus complication status: 

without complication     Diabetes mellitus long term insulin use: without long 

term use        Qualified Code(s): E11.9 - Type 2 diabetes mellitus without 

complications  





(3) HTN (hypertension)


Code(s): I10 - ESSENTIAL (PRIMARY) HYPERTENSION   Qualifiers: 


     Hypertension type: essential hypertension        Qualified Code(s): I10 - 

Essential (primary) hypertension  








Assessment/Plan


 Current Medications











Generic Name Dose Route Start Last Admin





  Trade Name Freq  PRN Reason Stop Dose Admin


 


Acetaminophen  500 mg 04/01/17 00:50 04/02/17 12:40





  Tylenol -  PO   500 mg





  Q6H PRN   Administration





  PAIN   


 


Aspirin  81 mg 04/01/17 10:00 04/06/17 09:54





  Asa -  PO   81 mg





  DAILY VICKIE   Administration


 


Bacitracin  1 applic 04/01/17 10:00 04/06/17 09:55





  Bacitracin -  TP   1 applic





  DAILY VICKIE   Administration


 


Calcitriol  0.25 mcg 04/01/17 10:00 04/06/17 09:55





  Rocaltrol -  PO   0.25 mcg





  DAILY VICKIE   Administration


 


Carvedilol  12.5 mg 04/02/17 09:59 04/05/17 22:07





  Coreg -  PO   12.5 mg





  BID V[facility]   Administration


 


Gabapentin  300 mg 04/01/17 10:00 04/06/17 09:54





  Neurontin -  PO   300 mg





  BID [facility]   Administration


 


Insulin Aspart  0 vial 03/31/17 22:00 04/06/17 12:45





  Novolog Vial Sliding Scale -  SQ   6 units





  [facility]   Administration





  Protocol   


 


Insulin Detemir  12 units 04/05/17 16:20 04/06/17 06:33





  Levemir Vial  SQ   12 units





  BID@0700,2200 [facility]   Administration


 


Latanoprost  1 drop 04/01/17 22:00 04/05/17 22:17





  Xalatan 0.005% Eye Drops -  OU   Not Given





  HS [facility]   


 


Multivitamins/Minerals/Vitamin C  1 tab 04/01/17 10:00 04/06/17 09:55





  Tab-A-Vit -  PO   1 tab





  DAILY VICKIE   Administration


 


Pantoprazole Sodium  40 mg 04/01/17 10:00 04/06/17 09:54





  Protonix -  PO   40 mg





  DAILY VICKIE   Administration














Impression


1. CKD


2. HTN


3. hyperlipidemia


4. r/o acs


5. DM





Plan


- clonidine and norvasc held as pt was hypotensive


- cont with coreg and can decrease dose if needed


- lasix on hold for now


- monitor renal function, he will need outpt follow up


- will follow pt while in hospital


- cont to monitor orthostatic vitals


Dr Welsh

## 2017-04-06 NOTE — PN
Progress Note (short form)





- Note


Progress Note: 


Subjective: The patient was seen at the bedside, he states he felt "woozy" this 

AM. 


Remains orthostatic - supine 123/66 mmHg -> sitting 88/61 mmHg -> standing 57/

39 mmHg 


 


 Current Medications











Generic Name Dose Route Start Last Admin





  Trade Name Freq  PRN Reason Stop Dose Admin


 


Acetaminophen  500 mg 04/01/17 00:50 04/02/17 12:40





  Tylenol -  PO   500 mg





  Q6H PRN   Administration





  PAIN   


 


Aspirin  81 mg 04/01/17 10:00 04/06/17 09:54





  Asa -  PO   81 mg





  DAILY VICKIE   Administration


 


Bacitracin  1 applic 04/01/17 10:00 04/06/17 09:55





  Bacitracin -  TP   1 applic





  DAILY VICKIE   Administration


 


Calcitriol  0.25 mcg 04/01/17 10:00 04/06/17 09:55





  Rocaltrol -  PO   0.25 mcg





  DAILY VICKIE   Administration


 


Carvedilol  12.5 mg 04/02/17 09:59 04/05/17 22:07





  Coreg -  PO   12.5 mg





  BID [facility]   Administration


 


Gabapentin  300 mg 04/01/17 10:00 04/06/17 09:54





  Neurontin -  PO   300 mg





  BID [facility]   Administration


 


Insulin Aspart  0 vial 03/31/17 22:00 04/06/17 06:34





  Novolog Vial Sliding Scale -  SQ   4 units





  [facility]   Administration





  Protocol   


 


Insulin Detemir  12 units 04/05/17 16:20 04/06/17 06:33





  Levemir Vial  SQ   12 units





  BID@0700,2200 [facility]   Administration


 


Latanoprost  1 drop 04/01/17 22:00 04/05/17 22:17





  Xalatan 0.005% Eye Drops -  OU   Not Given





  HS [facility]   


 


Multivitamins/Minerals/Vitamin C  1 tab 04/01/17 10:00 04/06/17 09:55





  Tab-A-Vit -  PO   1 tab





  DAILY VICKIE   Administration


 


Pantoprazole Sodium  40 mg 04/01/17 10:00 04/06/17 09:54





  Protonix -  PO   40 mg





  DAILY VICKIE   Administration








Objective: 


 


 


 Vital Signs











 Period  Temp  Pulse  Resp  BP Sys/Bellamy  Pulse Ox


 


 Last 24 Hr  97.8 F-98.8 F  59-80  17-20  /39-74  95








Physical Exam: 


General: NAD, A&Ox3


Lungs: CTA bilaterally


Heart: RRR, S1S2


Abd: Soft, non-tender, non-distended. Normoactive bowel sounds


Ext: Warm, well-perfused. 2+ DP/PT bilaterally


Neuro: CN 2-12 intact





 


 


 CBCD











WBC  7.1 K/mm3 (4.0-10.0)   04/06/17  05:50    


 


RBC  4.25 M/mm3 (4.00-5.60)   04/06/17  05:50    


 


Hgb  13.5 GM/dL (11.7-16.9)   04/06/17  05:50    


 


Hct  40.8 % (35.4-49)   04/06/17  05:50    


 


MCV  96.1 fl (80-96)  H  04/06/17  05:50    


 


MCHC  33.2 g/dl (32.0-35.9)   04/06/17  05:50    


 


RDW  13.9 % (11.9-15.9)   04/06/17  05:50    


 


Plt Count  142 K/MM3 (134-434)   04/06/17  05:50    


 


MPV  10.5 fl (7.5-11.1)   04/06/17  05:50    








 CMP











Sodium  136 mmol/L (136-145)   04/06/17  05:50    


 


Potassium  4.2 mmol/L (3.5-5.1)   04/06/17  05:50    


 


Chloride  101 mmol/L ()   04/06/17  05:50    


 


Carbon Dioxide  22 mmol/L (21-32)   04/06/17  05:50    


 


Anion Gap  13  (8-16)   04/06/17  05:50    


 


BUN  53 mg/dL (7-18)  H  04/06/17  05:50    


 


Creatinine  3.2 mg/dL (0.7-1.3)  H  04/06/17  05:50    


 


Creat Clearance w eGFR  26.48  (>60)   04/02/17  06:02    


 


Random Glucose  267 mg/dL ()  H  04/06/17  05:50    


 


Calcium  8.7 mg/dL (8.5-10.1)   04/06/17  05:50    


 


Total Bilirubin  0.6 mg/dL (0.2-1.0)  D 04/02/17  06:02    


 


AST  17 U/L (15-37)   04/02/17  06:02    


 


ALT  19 U/L (12-78)   04/02/17  06:02    


 


Alkaline Phosphatase  80 U/L ()   04/02/17  06:02    


 


Total Protein  7.1 g/dl (6.4-8.2)   04/02/17  06:02    


 


Albumin  3.5 g/dl (3.4-5.0)   04/02/17  06:02    








 CARDIAC ENZYMES











Creatine Kinase  156 IU/L ()   04/01/17  06:07    


 


Troponin I  0.13 ng/ml (0.00-0.05)  H D 04/01/17  06:07    








Assessment: This is a 61 year old male with PMHx of IDDM, HTN, CKD (RTA4), 

cervical spinal stenosis, neuropathy, R- Foot Drop, atrophy, depression who 

presented to the ED after being found unresponsive with a BGM of 30 (responded 

to D10). 





Plan:


1) Cardiology: Chest pain


- Trops elevated, however trended down. Per cardiology, not indicative of ACS 

clinically. No EKG acute changes


- ECHO reviewed


- Persantine stress overall negative. EF 67%


Diastolic LV dysfunction


- Hold Coreg, Lasix, Norvasc, Clonidine 2/2 significant orthostatic hypotension


- Continue ASA


- Appreciate cardiology consult


HTN


- As above





2) : CKD


- Baseline Cr 2.5-2.6


- Cr remains elevated 3.2 (patient reports urinating with no issues)


- Renal ultrasound consistent with CKD


- Continue to monitor


- Appreciate nephrology consult





3) Endocrine: IDDM


- BGM ACHS


- ISS ACHS


- Continue Levemir 12u sq bid





4) F/E/N:


- Monitor electrolytes


- Sodium controlled diet





5) Prophylaxis: 


- OOB ambulating


- Heparin 5,000u sq bid





6) Dispo:


- Requires continued inpatient care





CODE STATUS: FULL CODE





Visit type





- Emergency Visit


Emergency Visit: Yes


ED Registration Date: 03/31/17


Care time: The patient presented to the Emergency Department on the above date 

and was hospitalized for further evaluation of their emergent condition.





- New Patient


This patient is new to me today: No





- Critical Care


Critical Care patient: No

## 2017-04-06 NOTE — PN
Progress Note, Physician


Chief Complaint: 


Events noted


Remains orthostatic - supine 123/66 mmHg --> sitting 88/61 mmHg --> standing 57/

39 mmHg


History of Present Illness: 


Patient was seen and examined. Awake and alert. Chart was reviewed


As outlined. Denies chest pain, SOB or palpitation





- Current Medication List


Current Medications: 


Active Medications





Acetaminophen (Tylenol -)  500 mg PO Q6H PRN


   PRN Reason: PAIN


   Last Admin: 04/02/17 12:40 Dose:  500 mg


Aspirin (Asa -)  81 mg PO DAILY [facility]


   Last Admin: 04/06/17 09:54 Dose:  81 mg


Bacitracin (Bacitracin -)  1 applic TP DAILY [facility]


   Last Admin: 04/06/17 09:55 Dose:  1 applic


Calcitriol (Rocaltrol -)  0.25 mcg PO DAILY [facility]


   Last Admin: 04/06/17 09:55 Dose:  0.25 mcg


Carvedilol (Coreg -)  12.5 mg PO BID [facility]


   Last Admin: 04/05/17 22:07 Dose:  12.5 mg


Duloxetine HCl (Cymbalta -)  30 mg PO DAILY [facility]


   Last Admin: 04/06/17 09:54 Dose:  30 mg


Gabapentin (Neurontin -)  300 mg PO BID [facility]


   Last Admin: 04/06/17 09:54 Dose:  300 mg


Heparin Sodium (Porcine) (Heparin -)  5,000 unit SQ BID [facility]


   Last Admin: 04/06/17 09:55 Dose:  5,000 unit


Insulin Aspart (Novolog Vial Sliding Scale -)  0 vial SQ ACHS [facility]


   PRN Reason: Protocol


   Last Admin: 04/06/17 06:34 Dose:  4 units


Insulin Detemir (Levemir Vial)  12 units SQ BID@0700,2200 [facility]


   Last Admin: 04/06/17 06:33 Dose:  12 units


Latanoprost (Xalatan 0.005% Eye Drops -)  1 drop OU HS [facility]


   Last Admin: 04/05/17 22:17 Dose:  Not Given


Multivitamins/Minerals/Vitamin C (Tab-A-Vit -)  1 tab PO DAILY [facility]


   Last Admin: 04/06/17 09:55 Dose:  1 tab


Pantoprazole Sodium (Protonix -)  40 mg PO DAILY [facility]


   Last Admin: 04/06/17 09:54 Dose:  40 mg











- Objective


Vital Signs: 


 Vital Signs











Temperature  98 F   04/06/17 05:00


 


Pulse Rate  65   04/06/17 05:00


 


Respiratory Rate  18   04/06/17 05:00


 


Blood Pressure  109/59   04/06/17 05:00


 


O2 Sat by Pulse Oximetry (%)  95   04/05/17 21:00











Neck: Yes: Supple


Cardiovascular: Yes: Regular Rate and Rhythm, S1, S2


Respiratory: Yes: CTA Bilaterally


Gastrointestinal: Yes: Normal Bowel Sounds, Soft.  No: Tenderness


Edema: No


Additional Findings/Remarks: 








- Review of Systems


Cardiovascular: As noted above


Respiratory: denies: Cough or Sputum Production


Gastrointestinal: denies: Nausea, Vomiting, Diarrhea, Constipation or Abdominal 

Pain  


Musculoskeletal:  No symptoms reported 


Neurological: Foot drop and spinal stenosis 











Labs: 


 CBC, BMP





 04/06/17 05:50 





 04/06/17 05:50 














Problem List





- Problems


(1) CKD (chronic kidney disease)


Code(s): N18.9 - CHRONIC KIDNEY DISEASE, UNSPECIFIED   





(2) Chest pain


Code(s): R07.9 - CHEST PAIN, UNSPECIFIED   Qualifiers: 


     Chest pain type: unspecified        Qualified Code(s): R07.9 - Chest pain, 

unspecified  





(3) Diabetes mellitus


Code(s): E11.9 - TYPE 2 DIABETES MELLITUS WITHOUT COMPLICATIONS   Qualifiers: 


     Diabetes mellitus type: type 2     Diabetes mellitus complication status: 

without complication     Diabetes mellitus long term insulin use: without long 

term use        Qualified Code(s): E11.9 - Type 2 diabetes mellitus without 

complications  





(4) HTN (hypertension)


Code(s): I10 - ESSENTIAL (PRIMARY) HYPERTENSION   Qualifiers: 


     Hypertension type: essential hypertension        Qualified Code(s): I10 - 

Essential (primary) hypertension  





(5) Neuropathy due to type 2 diabetes mellitus


Code(s): E11.40 - TYPE 2 DIABETES MELLITUS WITH DIABETIC NEUROPATHY, UNSP








Assessment/Plan


1. Chest pain syndrome, CAD angina pectoris with borderline Troponin I 

elevation not indicative of ACS clinically - now resolved


2. Orthostatic hypotension - etiology to be determined


3. Diastolic LV dysfunction with class 0-I NYHA classification LV failure


4. HTN


5. IDDM


6. Hypercholesterolemia


7. Cervical Spinal Stenosis


8. CKD 


9. Neuropathy 





PLAN:


1. Hold Coreg today and consider reducing dose. Amlodipine and Clonidine has 

been stopped.


2. Discontinue Heparin SQ


3. Discontinue Cymbalta as it may cause orthostasis


4. Continue ASA


5. Continue monitoring for orthostasis.  Discharge planning when stable





Further plans are to follow


Mejia Nunez MD

## 2017-04-07 VITALS — TEMPERATURE: 97.8 F

## 2017-04-07 VITALS — DIASTOLIC BLOOD PRESSURE: 92 MMHG | HEART RATE: 71 BPM | SYSTOLIC BLOOD PRESSURE: 160 MMHG

## 2017-04-07 LAB
ALBUMIN SERPL-MCNC: 3.6 G/DL (ref 3.4–5)
ALP SERPL-CCNC: 81 U/L (ref 45–117)
ALT SERPL-CCNC: 25 U/L (ref 12–78)
ANION GAP SERPL CALC-SCNC: 10 MMOL/L (ref 8–16)
AST SERPL-CCNC: 17 U/L (ref 15–37)
BASOPHILS # BLD: 0.6 % (ref 0–2)
BILIRUB SERPL-MCNC: 0.4 MG/DL (ref 0.2–1)
CALCIUM SERPL-MCNC: 8.7 MG/DL (ref 8.5–10.1)
CO2 SERPL-SCNC: 25 MMOL/L (ref 21–32)
COCKROFT - GAULT: 33.02
CREAT SERPL-MCNC: 2.9 MG/DL (ref 0.7–1.3)
DEPRECATED RDW RBC AUTO: 13.8 % (ref 11.9–15.9)
EOSINOPHIL # BLD: 5.5 % (ref 0–4.5)
GLUCOSE SERPL-MCNC: 217 MG/DL (ref 74–106)
MCH RBC QN AUTO: 31.7 PG (ref 25.7–33.7)
MCHC RBC AUTO-ENTMCNC: 33.1 G/DL (ref 32–35.9)
MCV RBC: 95.9 FL (ref 80–96)
NEUTROPHILS # BLD: 60.2 % (ref 42.8–82.8)
PLATELET # BLD AUTO: 147 K/MM3 (ref 134–434)
PMV BLD: 10.8 FL (ref 7.5–11.1)
PROT SERPL-MCNC: 7.1 G/DL (ref 6.4–8.2)
WBC # BLD AUTO: 6.4 K/MM3 (ref 4–10)

## 2017-04-07 RX ADMIN — MULTIVITAMIN TABLET SCH TAB: TABLET at 09:46

## 2017-04-07 RX ADMIN — GABAPENTIN SCH MG: 300 CAPSULE ORAL at 09:46

## 2017-04-07 RX ADMIN — INSULIN ASPART SCH UNITS: 100 INJECTION, SOLUTION INTRAVENOUS; SUBCUTANEOUS at 12:12

## 2017-04-07 RX ADMIN — ASPIRIN 81 MG SCH MG: 81 TABLET ORAL at 09:46

## 2017-04-07 RX ADMIN — BACITRACIN ZINC SCH APPLIC: 500 OINTMENT TOPICAL at 09:47

## 2017-04-07 RX ADMIN — INSULIN ASPART SCH: 100 INJECTION, SOLUTION INTRAVENOUS; SUBCUTANEOUS at 06:42

## 2017-04-07 RX ADMIN — INSULIN DETEMIR SCH UNITS: 100 INJECTION, SOLUTION SUBCUTANEOUS at 06:42

## 2017-04-07 RX ADMIN — PANTOPRAZOLE SODIUM SCH MG: 40 TABLET, DELAYED RELEASE ORAL at 09:46

## 2017-04-07 RX ADMIN — CALCITRIOL SCH MCG: 0.25 CAPSULE ORAL at 09:47

## 2017-04-07 NOTE — PN
Progress Note (short form)





- Note


Progress Note: 


Subjective: The patient was seen at the bedside, he reports feeling better 

today. Denies any dizziness


F/u orthostatics today  


 





 Current Medications











Generic Name Dose Route Start Last Admin





  Trade Name Nixon  PRN Reason Stop Dose Admin


 


Acetaminophen  500 mg 04/01/17 00:50 04/02/17 12:40





  Tylenol -  PO   500 mg





  Q6H PRN   Administration





  PAIN   


 


Aspirin  81 mg 04/01/17 10:00 04/06/17 09:54





  Asa -  PO   81 mg





  DAILY VICKIE   Administration


 


Bacitracin  1 applic 04/01/17 10:00 04/06/17 09:55





  Bacitracin -  TP   1 applic





  DAILY VICKIE   Administration


 


Calcitriol  0.25 mcg 04/01/17 10:00 04/06/17 09:55





  Rocaltrol -  PO   0.25 mcg





  DAILY VICKIE   Administration


 


Carvedilol  12.5 mg 04/02/17 09:59 04/06/17 22:48





  Coreg -  PO   Not Given





  BID VICKIE   


 


Gabapentin  300 mg 04/01/17 10:00 04/06/17 22:49





  Neurontin -  PO   300 mg





  BID VICKIE   Administration


 


Insulin Aspart  0 vial 03/31/17 22:00 04/07/17 06:42





  Novolog Vial Sliding Scale -  SQ   Not Given





  [facility]   





  Protocol   


 


Insulin Detemir  12 units 04/05/17 16:20 04/07/17 06:42





  Levemir Vial  SQ   12 units





  BID@0700,2200 VICK[facility]   Administration


 


Latanoprost  1 drop 04/01/17 22:00 04/06/17 22:55





  Xalatan 0.005% Eye Drops -  OU   1 drp





  HS VICKIE   Administration


 


Multivitamins/Minerals/Vitamin C  1 tab 04/01/17 10:00 04/06/17 09:55





  Tab-A-Vit -  PO   1 tab





  DAILY VICKIE   Administration


 


Pantoprazole Sodium  40 mg 04/01/17 10:00 04/06/17 09:54





  Protonix -  PO   40 mg





  DAILY VICKIE   Administration








Objective: 


 


 Vital Signs











 Period  Temp  Pulse  Resp  BP Sys/Bellamy  Pulse Ox


 


 Last 24 Hr  98.0 F-98.8 F  66-84  18-20  /39-93  98








Physical Exam: 


General: NAD, A&Ox3


Lungs: CTA bilaterally


Heart: RRR, S1S2


Abd: Soft, non-tender, non-distended. Normoactive bowel sounds


Ext: Warm, well-perfused. 2+ DP/PT bilaterally


Neuro: CN 2-12 intact





 


 CBCD











WBC  6.4 K/mm3 (4.0-10.0)   04/07/17  05:35    


 


RBC  4.32 M/mm3 (4.00-5.60)   04/07/17  05:35    


 


Hgb  13.7 GM/dL (11.7-16.9)   04/07/17  05:35    


 


Hct  41.4 % (35.4-49)   04/07/17  05:35    


 


MCV  95.9 fl (80-96)   04/07/17  05:35    


 


MCHC  33.1 g/dl (32.0-35.9)   04/07/17  05:35    


 


RDW  13.8 % (11.9-15.9)   04/07/17  05:35    


 


Plt Count  147 K/MM3 (134-434)   04/07/17  05:35    


 


MPV  10.8 fl (7.5-11.1)   04/07/17  05:35    








 CMP











Sodium  138 mmol/L (136-145)   04/07/17  05:35    


 


Potassium  4.3 mmol/L (3.5-5.1)   04/07/17  05:35    


 


Chloride  103 mmol/L ()   04/07/17  05:35    


 


Carbon Dioxide  25 mmol/L (21-32)   04/07/17  05:35    


 


Anion Gap  10  (8-16)   04/07/17  05:35    


 


BUN  52 mg/dL (7-18)  H  04/07/17  05:35    


 


Creatinine  2.9 mg/dL (0.7-1.3)  H  04/07/17  05:35    


 


Creat Clearance w eGFR  22.31  (>60)   04/07/17  05:35    


 


Random Glucose  217 mg/dL ()  H  04/07/17  05:35    


 


Calcium  8.7 mg/dL (8.5-10.1)   04/07/17  05:35    


 


Total Bilirubin  0.4 mg/dL (0.2-1.0)  D 04/07/17  05:35    


 


AST  17 U/L (15-37)   04/07/17  05:35    


 


ALT  25 U/L (12-78)  D 04/07/17  05:35    


 


Alkaline Phosphatase  81 U/L ()   04/07/17  05:35    


 


Total Protein  7.1 g/dl (6.4-8.2)   04/07/17  05:35    


 


Albumin  3.6 g/dl (3.4-5.0)   04/07/17  05:35    








 CARDIAC ENZYMES











Creatine Kinase  156 IU/L ()   04/01/17  06:07    


 


Troponin I  0.13 ng/ml (0.00-0.05)  H D 04/01/17  06:07    











Assessment: This is a 61 year old male with PMHx of IDDM, HTN, CKD (RTA4), 

cervical spinal stenosis, neuropathy, R- Foot Drop, atrophy, depression who 

presented to the ED after being found unresponsive with a BGM of 30 (responded 

to D10). 





Plan:


1) Cardiology: Chest pain


- Trops elevated, however trended down. Per cardiology, not indicative of ACS 

clinically. No EKG acute changes


- ECHO reviewed


- Persantine stress overall negative. EF 67%


Diastolic LV dysfunction


- Hold Coreg, Lasix, Norvasc, Clonidine 2/2 significant orthostatic hypotension


- Continue ASA


- Appreciate cardiology consult


HTN


- As above





2) : CKD


- Baseline Cr 2.5-2.6


- Cr remains elevated however trending down to 2.9


- Renal ultrasound consistent with CKD


- Continue to monitor


- Appreciate nephrology consult





3) Endocrine: IDDM


- BGM ACHS


- ISS ACHS


- Continue Levemir 12u sq bid





4) F/E/N:


- Monitor electrolytes


- Sodium controlled diet





5) Prophylaxis: 


- OOB ambulating


- Heparin 5,000u sq bid





6) Dispo:


- If orthostatics improve today, will possibly discharge





CODE STATUS: FULL CODE





Visit type





- Emergency Visit


Emergency Visit: Yes


ED Registration Date: 03/31/17


Care time: The patient presented to the Emergency Department on the above date 

and was hospitalized for further evaluation of their emergent condition.





- New Patient


This patient is new to me today: No





- Critical Care


Critical Care patient: No

## 2017-04-07 NOTE — PN
Progress Note, Physician


History of Present Illness: 


No further dizziness, syncope or chest pain, reports peripheral neuropathy on 

Neurontin.





- Current Medication List


Current Medications: 


Active Medications





Acetaminophen (Tylenol -)  500 mg PO Q6H PRN


   PRN Reason: PAIN


   Last Admin: 04/02/17 12:40 Dose:  500 mg


Aspirin (Asa -)  81 mg PO DAILY [facility]


   Last Admin: 04/06/17 09:54 Dose:  81 mg


Bacitracin (Bacitracin -)  1 applic TP DAILY [facility]


   Last Admin: 04/06/17 09:55 Dose:  1 applic


Calcitriol (Rocaltrol -)  0.25 mcg PO DAILY [facility]


   Last Admin: 04/06/17 09:55 Dose:  0.25 mcg


Carvedilol (Coreg -)  12.5 mg PO BID [facility]


   Last Admin: 04/06/17 22:48 Dose:  Not Given


Gabapentin (Neurontin -)  300 mg PO BID [facility]


   Last Admin: 04/06/17 22:49 Dose:  300 mg


Insulin Aspart (Novolog Vial Sliding Scale -)  0 vial SQ ACHS [facility]


   PRN Reason: Protocol


   Last Admin: 04/07/17 06:42 Dose:  Not Given


Insulin Detemir (Levemir Vial)  12 units SQ BID@0700,2200 [facility]


   Last Admin: 04/07/17 06:42 Dose:  12 units


Latanoprost (Xalatan 0.005% Eye Drops -)  1 drop OU HS [facility]


   Last Admin: 04/06/17 22:55 Dose:  1 drp


Multivitamins/Minerals/Vitamin C (Tab-A-Vit -)  1 tab PO DAILY [facility]


   Last Admin: 04/06/17 09:55 Dose:  1 tab


Pantoprazole Sodium (Protonix -)  40 mg PO DAILY [facility]


   Last Admin: 04/06/17 09:54 Dose:  40 mg











- Objective


Vital Signs: 


 Vital Signs











Temperature  98.2 F   04/07/17 01:00


 


Pulse Rate  68   04/07/17 05:00


 


Respiratory Rate  20   04/07/17 05:00


 


Blood Pressure  119/60   04/07/17 05:00


 


O2 Sat by Pulse Oximetry (%)  98   04/06/17 21:00











Constitutional: Yes: No Distress, Calm


Neck: Yes: Supple


Cardiovascular: Yes: Regular Rate and Rhythm


Respiratory: Yes: Regular, CTA Bilaterally


Gastrointestinal: Yes: Normal Bowel Sounds, Soft


Edema: No


Labs: 


 CBC, BMP





 04/07/17 05:35 





 04/07/17 05:35 





 INR, PTT











INR  0.97  (0.82-1.09)   03/31/17  10:43    














Problem List





- Problems


(1) CKD (chronic kidney disease)


Code(s): N18.9 - CHRONIC KIDNEY DISEASE, UNSPECIFIED   Qualifiers: 


     Chronic kidney disease stage: stage 3 (moderate)        Qualified Code(s): 

N18.3 - Chronic kidney disease, stage 3 (moderate)  





(2) Chest pain


Code(s): R07.9 - CHEST PAIN, UNSPECIFIED   Qualifiers: 


     Chest pain type: unspecified        Qualified Code(s): R07.9 - Chest pain, 

unspecified  





(3) Diabetes mellitus


Code(s): E11.9 - TYPE 2 DIABETES MELLITUS WITHOUT COMPLICATIONS   Qualifiers: 


     Diabetes mellitus type: type 2     Diabetes mellitus complication status: 

without complication     Diabetes mellitus long term insulin use: without long 

term use        Qualified Code(s): E11.9 - Type 2 diabetes mellitus without 

complications  





(4) HTN (hypertension)


Code(s): I10 - ESSENTIAL (PRIMARY) HYPERTENSION   Qualifiers: 


     Hypertension type: essential hypertension        Qualified Code(s): I10 - 

Essential (primary) hypertension  





(5) Neuropathy due to type 2 diabetes mellitus


Code(s): E11.40 - TYPE 2 DIABETES MELLITUS WITH DIABETIC NEUROPATHY, UNSP





(6) Orthostatic hypotension


Code(s): I95.1 - ORTHOSTATIC HYPOTENSION





(7) Diastolic dysfunction


Code(s): I51.9 - HEART DISEASE, UNSPECIFIED








Assessment/Plan


Echocardiography revealed normal LV size and function, with mild TR


MPI study revealed inferior attenuation with normal LV function, LV EF of 67%





1. Chest pain syndrome, CAD angina pectoris 


2. Orthostatic hypotension since resolved


3. Diastolic LV dysfunction with class 0-I NYHA classification LV failure


4. HTN


5. IDDM


6. Hypercholesterolemia


7. Cervical Spinal Stenosis


8. CKD 


9. Diabetic neuropathy 





PLAN:


1. Resume carvedilol 6.25 bid


2. ACE-I/ARB once renal fxn stable


3. Discontinued Cymbalta as it may cause orthostasis


4. Continue ASA 81 qd


5. Discharge planning today

## 2017-04-07 NOTE — DS
[id number] Rate  20   04/07/17 05:00


 


Blood Pressure  160/92   04/07/17 08:12


 


O2 Sat by Pulse Oximetry (%)  98   04/06/17 21:00











Findings/Remarks: 


Physical Exam: 


General: NAD, A&Ox3


Lungs: CTA bilaterally


Heart: RRR, S1S2


Abd: Soft, non-tender, non-distended. Normoactive bowel sounds


Ext: Warm, well-perfused. 2+ DP/PT bilaterally


Neuro: CN 2-12 intact





Labs: 


 CBC, BMP





 04/07/17 05:35 





 04/07/17 05:35 











Discharge Summary


Reason For Visit: DIABETES MELLITUS/CHEST PAIN


Current Active Problems





CKD (chronic kidney disease) (Acute) 


Cervical spinal stenosis (Acute) 


Chest pain (Acute) 


DVT prophylaxis (Acute) 


Depression (Acute) 


Diabetes mellitus (Acute) 


Diastolic dysfunction (Acute) 


HTN (hypertension) (Acute) 


Hypoglycemia (Acute) 


Neuropathy due to type 2 diabetes mellitus (Acute) 


Orthostatic hypotension (Acute) 








Hospital Course: 


This is a 61 year old male with PMHx of IDDM, HTN, CKD (RTA4), cervical spinal 

stenosis, neuropathy, R- Foot Drop, atrophy, depression who presented to the ED 

after being found unresponsive with a BGM of 30 (responded to D10). 





Plan:


1) Cardiology: Chest pain


- Trops elevated, however trended down. Per cardiology, not indicative of ACS 

clinically. No EKG acute changes


- ECHO reviewed


- Persantine stress overall negative. EF 67%


Diastolic LV dysfunction


- Resume Carvedilol 6.25mg bid


- Continue ASA


- Appreciate cardiology consult


HTN


- As above





2) : CKD


- Baseline Cr 2.5-2.6


- Cr remains elevated however trending down to 2.9


- Renal ultrasound consistent with CKD


- Continue to monitor


- Appreciate nephrology consult





3) Endocrine: IDDM


- BGM ACHS


- ISS ACHS


- Continue Levemir 12u sq bid


Condition: Improved





- Instructions


Diet, Activity, Other Instructions: 


Please return to the ED with new, persistent, or worsening symptoms. Please 

follow-up with providers as indicated. 





Please have your orthostatic vital signs checked daily. 


Referrals: 


Yesenia Burns MD [Primary Care Provider] - 1 Week


Javi Soliz MD [Staff Physician] - 1 Week


Casey Welsh MD [Staff Physician] -  (Please follow-up with nephrology 

within 1 week to have your kidney function re-checked. )


Disposition: SKILLED NURSING FACILITY





- Home Medications


Comprehensive Discharge Medication List: 


Ambulatory Orders





Calcitriol [Calcitriol -] 0.25 mcg PO DAILY 11/15/16 


Gabapentin [Neurontin] 300 mg PO BID 11/15/16 


Hypromellose 0.5% Opth Soln [Artificial Tears] 1 drop OU PRN PRN 11/15/16 


Latanoprost 0.005% Eye Drops [Xalatan 0.005% Eye Drops -] 1 drop OU HS 11/15/16 


Multivitamins [Multivit ([facility] Formulary)] 1 tab PO DAILY 11/15/16 


Pantoprazole Sodium [Protonix] 40 mg PO DAILY 11/15/16 


Acetaminophen [Tylenol Extra Strength] 500 mg PO PRN PRN #0  11/16/16 


Ammonium Lactate Cream [Lac-Hydrin 12% Cream -] 0.01 unit TP DAILY 03/31/17 


Bacitracin - [Bacitracin Topical Ointment -] 1 applic TP DAILY 03/31/17 


Hydrocodone/Acetaminophen [Hydrocodon-Acetaminoph 7.5-325] 0 each PO Q6H MDD 4 

03/31/17 


Lactulose [Cephulac -] 30 ml PO DAILY 03/31/17 


Linagliptin [Tradjenta] 5 mg PO DAILY 03/31/17 


Sodium Polystyrene Sulfonate 15 gm PO DAILY 03/31/17 


Synalar Ts 0.01% Kit 0.01 drop TP DAILY 03/31/17 


Aspirin [ASA -] 81 mg PO DAILY #30 tab.chew 04/07/17 


Carvedilol [Coreg -] 6.25 mg PO BID #60 tablet 04/07/17 


Insulin (Levemir) [Levemir Vial] 12 units SQ BID@0700,2200 #1 ml 04/07/17 








This patient is new to me today: No


Emergency Visit: Yes


ED Registration Date: 03/31/17


Care time: The patient presented to the Emergency Department on the above date 

and was hospitalized for further evaluation of their emergent condition.


Critical Care patient: No





- Discharge Referral


Referred to [facility] No

## 2017-12-20 ENCOUNTER — HOSPITAL ENCOUNTER (EMERGENCY)
Dept: HOSPITAL 74 - JER | Age: 62
Discharge: HOME | End: 2017-12-20
Payer: COMMERCIAL

## 2017-12-20 VITALS — HEART RATE: 72 BPM | TEMPERATURE: 98.4 F | DIASTOLIC BLOOD PRESSURE: 86 MMHG | SYSTOLIC BLOOD PRESSURE: 128 MMHG

## 2017-12-20 VITALS — BODY MASS INDEX: 27.3 KG/M2

## 2017-12-20 DIAGNOSIS — N18.9: ICD-10-CM

## 2017-12-20 DIAGNOSIS — Z79.4: ICD-10-CM

## 2017-12-20 DIAGNOSIS — E74.39: Primary | ICD-10-CM

## 2017-12-20 DIAGNOSIS — I12.9: ICD-10-CM

## 2017-12-20 LAB
ACETONE SERPL-MCNC: NEGATIVE MG/DL
ALBUMIN SERPL-MCNC: 3.6 G/DL (ref 3.4–5)
ALP SERPL-CCNC: 78 U/L (ref 45–117)
ALT SERPL-CCNC: 20 U/L (ref 12–78)
ANION GAP SERPL CALC-SCNC: 9 MMOL/L (ref 8–16)
AST SERPL-CCNC: 19 U/L (ref 15–37)
BASOPHILS # BLD: 0.1 % (ref 0–2)
BILIRUB SERPL-MCNC: 0.7 MG/DL (ref 0.2–1)
BUN SERPL-MCNC: 37 MG/DL (ref 7–18)
CALCIUM SERPL-MCNC: 8.6 MG/DL (ref 8.5–10.1)
CHLORIDE SERPL-SCNC: 98 MMOL/L (ref 98–107)
CO2 SERPL-SCNC: 26 MMOL/L (ref 21–32)
CREAT SERPL-MCNC: 2.8 MG/DL (ref 0.7–1.3)
DEPRECATED RDW RBC AUTO: 13.5 % (ref 11.9–15.9)
EOSINOPHIL # BLD: 0 % (ref 0–4.5)
GLUCOSE SERPL-MCNC: 443 MG/DL (ref 74–106)
HCT VFR BLD CALC: 39.9 % (ref 35.4–49)
HGB BLD-MCNC: 12.9 GM/DL (ref 11.7–16.9)
INR BLD: 1.04 (ref 0.82–1.09)
LYMPHOCYTES # BLD: 9.9 % (ref 8–40)
MCH RBC QN AUTO: 31.3 PG (ref 25.7–33.7)
MCHC RBC AUTO-ENTMCNC: 32.5 G/DL (ref 32–35.9)
MCV RBC: 96.5 FL (ref 80–96)
MONOCYTES # BLD AUTO: 9.6 % (ref 3.8–10.2)
NEUTROPHILS # BLD: 80.4 % (ref 42.8–82.8)
PLATELET # BLD AUTO: 159 K/MM3 (ref 134–434)
PMV BLD: 9.9 FL (ref 7.5–11.1)
POTASSIUM SERPLBLD-SCNC: 4.5 MMOL/L (ref 3.5–5.1)
PROT SERPL-MCNC: 7.1 G/DL (ref 6.4–8.2)
PT PNL PPP: 11.7 SEC (ref 9.98–11.88)
RBC # BLD AUTO: 4.13 M/MM3 (ref 4–5.6)
SODIUM SERPL-SCNC: 133 MMOL/L (ref 136–145)
WBC # BLD AUTO: 12.9 K/MM3 (ref 4–10)

## 2017-12-20 PROCEDURE — 3E033VG INTRODUCTION OF INSULIN INTO PERIPHERAL VEIN, PERCUTANEOUS APPROACH: ICD-10-PCS

## 2017-12-20 PROCEDURE — 3E0337Z INTRODUCTION OF ELECTROLYTIC AND WATER BALANCE SUBSTANCE INTO PERIPHERAL VEIN, PERCUTANEOUS APPROACH: ICD-10-PCS

## 2017-12-20 NOTE — PDOC
History of Present Illness





- General


History Source: Patient


Exam Limitations: No Limitations





- History of Present Illness


Initial Comments: 





12/20/17 11:30


The patient is a 62 year old male with a significant PMH of insulin-dependent 

diabetes (w/associated neuropathy), HTN, cervical spinal stenosis, CKD, and 

depression who presents to the emergency department from [facility] for evaluation of elevated blood glucose and subjective 

fever. The patient reports that his nurse checked his blood glucose level this 

morning and noted it was over 460 mg/dL. The patient denies taking his insulin 

this morning prior to his nurse checking his sugar. He reports that the nurse 

administered insulin before calling EMS. The patient also notes intermittent 

cough and subjective fevers and chills beginning approximately 3 days ago. He 

notes recently being at [facility] ED for evaluation of bronchitis. The 

patient reports getting his flu shot this year.


 


The patient denies chest pain, shortness of breath, headache and dizziness. 


Denies nausea, vomit, diarrhea and constipation. 


Denies dysuria, frequency, urgency and hematuria.





Allergies: NKA


Past surgical history: None reported.


Social history: Former alcohol use. No reported cigarette or drug use.


PCP: Dr. Burns








<Apollo Hernandez - Last Filed: 12/20/17 12:41>





<Kalpesh Dorman - Last Filed: 12/20/17 16:33>





- General


Chief Complaint: Pain


Stated Complaint: NAUSEA


Time Seen by Provider: 12/20/17 10:50





Past History





<Apollo Hernandez - Last Filed: 12/20/17 12:41>





- Past Medical History


Cardiac Disorders: Yes (CAD)


COPD: No


Diabetes: Yes


HTN: Yes





- Suicide/Smoking/Psychosocial Hx


Smoking History: Never smoked


Have you smoked in the past 12 months: No


Information on smoking cessation initiated: No


Hx Alcohol Use: No (past)


Drug/Substance Use Hx: No


Substance Use Type: Alcohol


Hx Substance Use Treatment: No





<Kalpesh Dorman - Last Filed: 12/20/17 16:33>





- Past Medical History


Allergies/Adverse Reactions: 


 Allergies











Allergy/AdvReac Type Severity Reaction Status Date / Time


 


No Known Drug Allergies Allergy   Verified 11/16/16 09:26











Home Medications: 


Ambulatory Orders





Calcitriol [Calcitriol -] 0.25 mcg PO DAILY 11/15/16 


Gabapentin [Neurontin] 300 mg PO BID 11/15/16 


Hypromellose 0.5% Opth Soln [Artificial Tears] 1 drop OU PRN PRN 11/15/16 


Latanoprost 0.005% Eye Drops [Xalatan 0.005% Eye Drops -] 1 drop OU HS 11/15/16 


Multivitamins [Multivit ([facility] Formulary)] 1 tab PO DAILY 11/15/16 


Pantoprazole Sodium [Protonix] 40 mg PO DAILY 11/15/16 


Ammonium Lactate Cream [Lac-Hydrin 12% Cream -] 0.01 unit TP DAILY 03/31/17 


Sodium Polystyrene Sulfonate 15 gm PO DAILY 03/31/17 


Aspirin [ASA -] 81 mg PO DAILY #30 tab.chew 04/07/17 


Carvedilol [Coreg -] 6.25 mg PO BID #60 tablet 04/07/17 


Fluoxetine HCl 3 tab PO DAILY 12/20/17 


Insulin (Levemir) [Levemir Vial] 18 units SQ BID 12/20/17 


Insulin Sliding Scale [Novolog Vial Sliding Scale -] 0 units SQ TIDAC 12/20/17 











**Review of Systems





- Review of Systems


Able to Perform ROS?: Yes


Comments:: 





12/20/17 11:30


GENERAL/CONSTITUTIONAL: (+) Subjective fever. (+) Chills. No weakness.


HEAD, EYES, EARS, NOSE AND THROAT: No change in vision. No ear pain or 

discharge. No sore throat.


CARDIOVASCULAR: No chest pain or shortness of breath.


RESPIRATORY: No cough, wheezing, or hemoptysis.


GASTROINTESTINAL: No nausea, vomiting, diarrhea or constipation.


GENITOURINARY: No dysuria, frequency, or change in urination.


MUSCULOSKELETAL: No joint or muscle swelling or pain. No neck or back pain.


SKIN: No rash


NEUROLOGIC: No headache, vertigo, loss of consciousness, or change in strength/

sensation.


ENDOCRINE: No increased thirst. No abnormal weight change.


HEMATOLOGIC/LYMPHATIC: No anemia, easy bleeding, or history of blood clots.


ALLERGIC/IMMUNOLOGIC: No hives or skin allergy.


 








<Apollo Hernandez - Last Filed: 12/20/17 12:41>





*Physical Exam





- Vital Signs


 Last Vital Signs











Temp Pulse Resp BP Pulse Ox


 


 98.3 F   71   20   150/86   98 


 


 12/20/17 10:26  12/20/17 10:26  12/20/17 10:26  12/20/17 10:26  12/20/17 10:26














- Physical Exam


Comments: 





12/20/17 11:30


GENERAL: Awake, alert, and fully oriented, in no acute distress


HEAD: No signs of trauma


EYES: PERRLA, EOMI, sclera anicteric, conjunctiva clear


ENT: Auricles normal inspection, hearing grossly normal, nares patent, 

oropharynx clear without exudates. Moist mucosa


NECK: Normal ROM, supple, no lymphadenopathy, JVD, or masses


LUNGS: Breath sounds equal, clear to auscultation bilaterally.  No wheezes, and 

no crackles


HEART: Regular rate and rhythm, normal S1 and S2, no murmurs, rubs or gallops


ABDOMEN: Soft, nontender, normoactive bowel sounds.  No guarding, no rebound.  

No masses


EXTREMITIES: Normal range of motion, no edema.  No clubbing or cyanosis. No 

cords, erythema, or tenderness


NEUROLOGICAL: Cranial nerves II through XII grossly intact.  Normal speech, 

normal gait


SKIN: Warm, Dry, normal turgor, no rashes or lesions noted.








<Apollo Hernandez - Last Filed: 12/20/17 12:41>





- Vital Signs


 Last Vital Signs











Temp Pulse Resp BP Pulse Ox


 


 98.3 F   71   20   150/86   98 


 


 12/20/17 10:26  12/20/17 10:26  12/20/17 10:26  12/20/17 10:26  12/20/17 10:26














<Kalpesh Dorman - Last Filed: 12/20/17 16:33>





**Heart Score/ECG Review


  ** #1





12/20/17 12:41


Vent rate 68 bpm





**Poor data quality, interpretation may be adversely affected**


Sinus rhythm with premature atrial complexes


Nonspecific ST & T wave abnormality


Abnormal ECG





<Apollo Hernandez - Last Filed: 12/20/17 12:41>





ED Treatment Course





- LABORATORY


CBC & Chemistry Diagram: 


 12/20/17 11:15





 12/20/17 11:15





- ADDITIONAL ORDERS


Additional order review: 


 











  12/20/17





  11:15


 


RBC  4.13


 


MCV  96.5 H


 


MCHC  32.5


 


RDW  13.5


 


MPV  9.9


 


Neutrophils %  80.4  D


 


Lymphocytes %  9.9  D


 


Monocytes %  9.6


 


Eosinophils %  0.0  D


 


Basophils %  0.1














<Apollo Hernandez - Last Filed: 12/20/17 12:41>





- LABORATORY


CBC & Chemistry Diagram: 


 12/20/17 11:15





 12/20/17 11:15





<Kalpesh Dorman - Last Filed: 12/20/17 16:33>





*DC/Admit/Observation/Transfer





- Attestations


Scribe Attestion: 





12/20/17 11:31





Documentation prepared by Apollo Hernandez, acting as medical scribe for Kalpesh Dorman DO.





<Apollo Hernandez - Last Filed: 12/20/17 12:41>





- Discharge Dispostion


Admit: No





- Attestations


Physician Attestion: 





12/20/17 10:51








I, Dr. Kalpesh Dorman, attest that this document has been prepared under my 

direction and personally reviewed by me in its entirety.   I further attest, 

that it accurately reflects all work, treatment, procedures and medical decision

-making performed by me.  





<Kalpesh Dorman - Last Filed: 12/20/17 16:33>


Diagnosis at time of Disposition: 


 Labile blood glucose








- Discharge Dispostion


Disposition: HOME


Condition at time of disposition: Improved





- Referrals


Referrals: 


Yesenia Burns MD [Primary Care Provider] - 





- Patient Instructions


Additional Instructions: 


Return to us if any problems at all.  All medications as before.





- Post Discharge Activity

## 2017-12-30 NOTE — EKG
Test Reason : 

Blood Pressure : ***/*** mmHG

Vent. Rate : 068 BPM     Atrial Rate : 068 BPM

   P-R Int : 150 ms          QRS Dur : 082 ms

    QT Int : 432 ms       P-R-T Axes : 034 022 071 degrees

   QTc Int : 459 ms

 

SINUS RHYTHM WITH PREMATURE ATRIAL COMPLEXES

NONSPECIFIC ST AND T WAVE ABNORMALITY

ABNORMAL ECG

WHEN COMPARED WITH ECG OF 03-APR-2017 10:02,

PREMATURE ATRIAL COMPLEXES ARE NOW PRESENT BASELINE ARTIFACT

Confirmed by ANGELICA TURCIOS, CONNIE ([id number]) on 12/30/2017 11:23:36 AM

 

Referred By:             Confirmed By:CONNIE DOMINGUEZ MD

## 2018-09-29 ENCOUNTER — HOSPITAL ENCOUNTER (EMERGENCY)
Dept: HOSPITAL 74 - JER | Age: 63
LOS: 1 days | Discharge: HOME | End: 2018-09-30
Payer: COMMERCIAL

## 2018-09-29 VITALS — TEMPERATURE: 97.8 F

## 2018-09-29 VITALS — SYSTOLIC BLOOD PRESSURE: 156 MMHG | DIASTOLIC BLOOD PRESSURE: 76 MMHG | HEART RATE: 63 BPM

## 2018-09-29 VITALS — BODY MASS INDEX: 31.7 KG/M2

## 2018-09-29 DIAGNOSIS — E11.649: Primary | ICD-10-CM

## 2018-09-29 DIAGNOSIS — F32.9: ICD-10-CM

## 2018-09-29 DIAGNOSIS — Z79.4: ICD-10-CM

## 2018-09-29 DIAGNOSIS — I10: ICD-10-CM

## 2018-09-29 LAB
ALBUMIN SERPL-MCNC: 4 G/DL (ref 3.4–5)
ALP SERPL-CCNC: 101 U/L (ref 45–117)
ALT SERPL-CCNC: 25 U/L (ref 13–61)
ANION GAP SERPL CALC-SCNC: 12 MMOL/L (ref 8–16)
AST SERPL-CCNC: 24 U/L (ref 15–37)
BASOPHILS # BLD: 0.3 % (ref 0–2)
BILIRUB SERPL-MCNC: 0.5 MG/DL (ref 0.2–1)
BUN SERPL-MCNC: 25 MG/DL (ref 7–18)
CALCIUM SERPL-MCNC: 9.3 MG/DL (ref 8.5–10.1)
CHLORIDE SERPL-SCNC: 100 MMOL/L (ref 98–107)
CO2 SERPL-SCNC: 25 MMOL/L (ref 21–32)
CREAT SERPL-MCNC: 2.2 MG/DL (ref 0.55–1.3)
DEPRECATED RDW RBC AUTO: 13.5 % (ref 11.9–15.9)
EOSINOPHIL # BLD: 1.5 % (ref 0–4.5)
GLUCOSE SERPL-MCNC: 38 MG/DL (ref 74–106)
HCT VFR BLD CALC: 41.4 % (ref 35.4–49)
HGB BLD-MCNC: 14 GM/DL (ref 11.7–16.9)
LYMPHOCYTES # BLD: 17.3 % (ref 8–40)
MCH RBC QN AUTO: 32 PG (ref 25.7–33.7)
MCHC RBC AUTO-ENTMCNC: 33.8 G/DL (ref 32–35.9)
MCV RBC: 94.7 FL (ref 80–96)
MONOCYTES # BLD AUTO: 9.9 % (ref 3.8–10.2)
NEUTROPHILS # BLD: 71 % (ref 42.8–82.8)
PLATELET # BLD AUTO: 188 K/MM3 (ref 134–434)
PMV BLD: 9.1 FL (ref 7.5–11.1)
POTASSIUM SERPLBLD-SCNC: 4 MMOL/L (ref 3.5–5.1)
PROT SERPL-MCNC: 8 G/DL (ref 6.4–8.2)
RBC # BLD AUTO: 4.37 M/MM3 (ref 4–5.6)
SODIUM SERPL-SCNC: 138 MMOL/L (ref 136–145)
WBC # BLD AUTO: 6.7 K/MM3 (ref 4–10)

## 2018-09-29 PROCEDURE — 3E0337Z INTRODUCTION OF ELECTROLYTIC AND WATER BALANCE SUBSTANCE INTO PERIPHERAL VEIN, PERCUTANEOUS APPROACH: ICD-10-PCS | Performed by: EMERGENCY MEDICINE

## 2018-09-29 NOTE — PDOC
Attending Attestation





- HPI


HPI: 








09/29/18 19:53


The patient is a 63 year old male , from [facility] living 

[facility],with a significant PMH of diabetes who presents to the emergency 

department with low blood sugar prior to arrival to the ED. The patient reports 

that he was given his insulin shot tonight and his blood sugar decreased 

prompting him to come into the ED. the patient reports that he had not eaten 

since breakfast. The patient reports some right rib pain. He denies any other 

symptoms. He denies any fever, chills, nausea, vomiting, diarrhea, constipation 

or urinary symptoms. He denies any chest pain, shortness of breath, headache, 

or dizziness. The patient denies any other complaints. 








- Physicial Exam


PE: 








09/29/18 19:55


GENERAL: Awake, alert, and fully oriented (X3), in no acute distress


HEAD: No signs of trauma


EYES: PERRLA, EOMI, sclera anicteric, conjunctiva clear


ENT: Auricles normal inspection, hearing grossly normal, nares patent, 

oropharynx clear without exudates. Moist mucosa


NECK: Normal ROM, supple, no lymphadenopathy, JVD, or masses


LUNGS: Breath sounds equal, clear to auscultation bilaterally.  No wheezes, and 

no crackles


HEART: Regular rate and rhythm, normal S1 and S2, no murmurs, rubs or gallops


ABDOMEN: (+)diffuse abdominal tenderness , pain is distractible. Normally obese 

abdomen that felt full. Soft, normoactive bowel sounds.  Right rib pain over T9-

T12.  No guarding, no rebound.  No masses


EXTREMITIES: Normal range of motion, no edema.  No clubbing or cyanosis. No 

cords, erythema, or tenderness. No pronator drift


NEUROLOGICAL: Cranial nerves II through XII grossly intact.  Normal speech, 

normal gait


SKIN: Warm, Dry, normal turgor, no rashes or lesions noted.








Documentation prepared by Jasmyne Schmidt, acting as medical scribe for Nathalie Rivera MD.





<Jasmyne Schmidt - Last Filed: 09/29/18 19:53>





- Resident


Resident Name: Kavin Berrios





- ED Attending Attestation


I have performed the following: I have examined & evaluated the patient, The 

case was reviewed & discussed with the resident, I agree w/resident's findings 

& plan





- HPI


HPI: 





09/29/18 19:47


Pt comes with hypoglycemia after receiving his insulin, despite not eating his 

usual sandwich for lunch. Blood glc plummeted, while he and 2 friends were 

sitting on a bench outside. He slid off the bench, didn't strike his head or 

neck. He has minimal complaint of right rib pain. Pt has also slight abd 

tenderness with abd palpation. He seems to be constipated.





- Physicial Exam


PE: 





09/29/18 19:49


AGree with resident exam.


Pt is alert and oriented and his strength is intact. He is answering questions 

and following commands. He has no weakness. He has normal finger to nose and 

romberg. Reflexes intact





- Medical Decision Making





09/29/18 19:50


XR chest; XR abd; feed pt a tray.  Pt received D50 in the ER.


Stable at this time; stable vitals (155/80)and exam.


09/29/18 22:02


NSS IV going in


09/29/18 22:25


Pt received 1 D50 in the ER after which his blood sugar went back to the 50s. 


Pt received 2nd d50 and he is maintaining his bld glc ay 150.  He also ate a 

tray of food.  We are observing and hydrating, as his BUN and Cr are elevated 

and he will be reassessed before we decide whether to send him home.


09/29/18 22:26


Pt is stable.


09/29/18 22:38


If repeat blood sugar is normal, pt will go home.





<Nathalie Rivera - Last Filed: 09/29/18 22:39>

## 2018-09-29 NOTE — PDOC
History of Present Illness





- General


Chief Complaint: Blood Sugar Problem


Stated Complaint: HYPOGLYCEMIA


Time Seen by Provider: 09/29/18 19:06


History Source: Patient


Exam Limitations: No Limitations





- History of Present Illness


Initial Comments: 





09/29/18 19:30


63M from [facility] and Assisted with pmh of DM2 on 

Levemir and Novolog , HTN on coreg presents to the ED BIBA after found slumped 

on the floor by facility bench. Fingerstick was found to be in the 30's. 

Patient revealed to have been given his dose of insulin before receiving his 

dinner sandwich. He hadn't eating anything since breakfast. 











09/29/18 19:45


Received push of D50 immediately on arrival. 


09/29/18 20:01








Past History





- Past Medical History


Allergies/Adverse Reactions: 


 Allergies











Allergy/AdvReac Type Severity Reaction Status Date / Time


 


No Known Drug Allergies Allergy   Verified 08/18/18 01:07











Home Medications: 


Ambulatory Orders





Calcitriol [Calcitriol -] 0.25 mcg PO DAILY 11/15/16 


Gabapentin [Neurontin] 300 mg PO BID 11/15/16 


Hypromellose 0.5% Opth Soln [Artificial Tears] 1 drop OU PRN PRN 11/15/16 


Latanoprost 0.005% Eye Drops [Xalatan 0.005% Eye Drops -] 1 drop OU HS 11/15/16 


Multivitamins [Multivit ([facility] Formulary)] 1 tab PO DAILY 11/15/16 


Pantoprazole Sodium [Protonix] 40 mg PO DAILY 11/15/16 


Ammonium Lactate Cream [Lac-Hydrin 12% Cream -] 0.01 unit TP DAILY 03/31/17 


Sodium Polystyrene Sulfonate 15 gm PO DAILY 03/31/17 


Aspirin [ASA -] 81 mg PO DAILY #30 tab.chew 04/07/17 


Carvedilol [Coreg -] 6.25 mg PO BID #60 tablet 04/07/17 


Fluoxetine HCl 3 tab PO DAILY 12/20/17 


Insulin (Levemir) [Levemir Vial] 18 units SQ BID 12/20/17 


Insulin Sliding Scale [Novolog Vial Sliding Scale -] 0 units SQ TIDAC 12/20/17 








Cardiac Disorders: Yes (CAD)


COPD: No


Diabetes: Yes


HTN: Yes


Psychiatric Problems: Yes (depression)





- Suicide/Smoking/Psychosocial Hx


Smoking History: Former smoker


Have you smoked in the past 12 months: No


Hx Alcohol Use: Yes


Drug/Substance Use Hx: No


Substance Use Type: Alcohol


Hx Substance Use Treatment: No





**Review of Systems





- Review of Systems


Able to Perform ROS?: Yes


Is the patient limited English proficient: No


Constitutional: No: Symptoms Reported


HEENTM: No: Symptoms Reported


Respiratory: No: Symptoms reported


Cardiac (ROS): No: Symptoms Reported


: No: Symptoms Reported


Musculoskeletal: No: Symptoms Reported


Integumentary: No: Symptoms Reported





*Physical Exam





- Physical Exam


General Appearance: Yes: Nourished, Appropriately Dressed.  No: Apparent 

Distress


HEENT: positive: EOMI, MEI, Normal ENT Inspection


Respiratory/Chest: positive: Lungs Clear, Normal Breath Sounds, Other (right 

rib pain, no signs of trauma).  negative: Chest Tender


Cardiovascular: positive: Regular Rhythm, Regular Rate, S1, S2


Gastrointestinal/Abdominal: positive: Normal Bowel Sounds, Tender (mildly), Flat

, Soft


Musculoskeletal: positive: Normal Inspection.  negative: CVA Tenderness


Extremity: positive: Normal Capillary Refill, Normal Inspection, Normal Range 

of Motion


Integumentary: positive: Normal Color, Dry, Warm


Neurologic: positive: Fully Oriented, Alert, Depressed Affect





ED Treatment Course





- LABORATORY


CBC & Chemistry Diagram: 


 09/29/18 20:20





 09/29/18 20:20





Medical Decision Making





- Medical Decision Making





09/29/18 20:01


 - food


 - Imaging


 - Repeat fingerstick


 - Dispo


09/29/18 20:24


EKG: Normal sinus rhythm with sinus arrhythmia, normal EKG


09/29/18 22:35


Patient feeling much better. received another amp of d50 and 1L ns. ok to dc





*DC/Admit/Observation/Transfer


Diagnosis at time of Disposition: 


 Hypoglycemia








- Discharge Dispostion


Disposition: HOME


Condition at time of disposition: Improved


Decision to Admit order: No





- Referrals


Referrals: 


Adrian Galeas MD [Primary Care Provider] - 





- Patient Instructions


Printed Discharge Instructions:  DI for Hyperglycemia -- Adult


Additional Instructions: 


Come back to the ED for any new, worsening or concerning symptom. 








- Post Discharge Activity

## 2018-10-01 NOTE — EKG
Test Reason : 

Blood Pressure : ***/*** mmHG

Vent. Rate : 061 BPM     Atrial Rate : 061 BPM

   P-R Int : 158 ms          QRS Dur : 094 ms

    QT Int : 470 ms       P-R-T Axes : 039 030 084 degrees

   QTc Int : 473 ms

 

NORMAL SINUS RHYTHM WITH SINUS ARRHYTHMIA

NORMAL ECG

WHEN COMPARED WITH ECG OF 18-AUG-2018 02:12,

PREMATURE ATRIAL COMPLEXES ARE NO LONGER PRESENT

Confirmed by SAHARA BARROSO MD ([id number]) on 10/1/2018 6:05:19 AM

 

Referred By:             Confirmed By:SAHARA BARROSO MD

## 2019-01-09 ENCOUNTER — HOSPITAL ENCOUNTER (EMERGENCY)
Dept: HOSPITAL 74 - JER | Age: 64
Discharge: NURSING, REHAB OR LONG-TERM CARE | End: 2019-01-09
Payer: COMMERCIAL

## 2019-01-09 VITALS — HEART RATE: 70 BPM | DIASTOLIC BLOOD PRESSURE: 70 MMHG | SYSTOLIC BLOOD PRESSURE: 140 MMHG

## 2019-01-09 VITALS — TEMPERATURE: 98.2 F

## 2019-01-09 VITALS — BODY MASS INDEX: 31.6 KG/M2

## 2019-01-09 DIAGNOSIS — M48.02: ICD-10-CM

## 2019-01-09 DIAGNOSIS — N18.3: ICD-10-CM

## 2019-01-09 DIAGNOSIS — E11.22: ICD-10-CM

## 2019-01-09 DIAGNOSIS — R55: ICD-10-CM

## 2019-01-09 DIAGNOSIS — E11.649: Primary | ICD-10-CM

## 2019-01-09 DIAGNOSIS — Z79.84: ICD-10-CM

## 2019-01-09 DIAGNOSIS — I12.9: ICD-10-CM

## 2019-01-09 DIAGNOSIS — G62.9: ICD-10-CM

## 2019-01-09 LAB
ALBUMIN SERPL-MCNC: 3.2 G/DL (ref 3.4–5)
ALP SERPL-CCNC: 91 U/L (ref 45–117)
ALT SERPL-CCNC: 22 U/L (ref 13–61)
ANION GAP SERPL CALC-SCNC: 8 MMOL/L (ref 8–16)
AST SERPL-CCNC: 14 U/L (ref 15–37)
BASOPHILS # BLD: 0.5 % (ref 0–2)
BILIRUB SERPL-MCNC: 0.4 MG/DL (ref 0.2–1)
BUN SERPL-MCNC: 36 MG/DL (ref 7–18)
CALCIUM SERPL-MCNC: 8.2 MG/DL (ref 8.5–10.1)
CHLORIDE SERPL-SCNC: 100 MMOL/L (ref 98–107)
CO2 SERPL-SCNC: 24 MMOL/L (ref 21–32)
CREAT SERPL-MCNC: 2.8 MG/DL (ref 0.55–1.3)
DEPRECATED RDW RBC AUTO: 13.5 % (ref 11.9–15.9)
EOSINOPHIL # BLD: 2.8 % (ref 0–4.5)
GLUCOSE SERPL-MCNC: 235 MG/DL (ref 74–106)
HCT VFR BLD CALC: 35.5 % (ref 35.4–49)
HGB BLD-MCNC: 12.4 GM/DL (ref 11.7–16.9)
LYMPHOCYTES # BLD: 18.4 % (ref 8–40)
MAGNESIUM SERPL-MCNC: 2 MG/DL (ref 1.8–2.4)
MCH RBC QN AUTO: 32.9 PG (ref 25.7–33.7)
MCHC RBC AUTO-ENTMCNC: 34.8 G/DL (ref 32–35.9)
MCV RBC: 94.4 FL (ref 80–96)
MONOCYTES # BLD AUTO: 13.7 % (ref 3.8–10.2)
NEUTROPHILS # BLD: 64.6 % (ref 42.8–82.8)
PLATELET # BLD AUTO: 198 K/MM3 (ref 134–434)
PMV BLD: 9.6 FL (ref 7.5–11.1)
POTASSIUM SERPLBLD-SCNC: 4.3 MMOL/L (ref 3.5–5.1)
PROT SERPL-MCNC: 6.6 G/DL (ref 6.4–8.2)
RBC # BLD AUTO: 3.76 M/MM3 (ref 4–5.6)
SODIUM SERPL-SCNC: 132 MMOL/L (ref 136–145)
WBC # BLD AUTO: 6.3 K/MM3 (ref 4–10)

## 2019-01-09 NOTE — PDOC
Attending Attestation





- HPI


HPI: 


The patient is a 63 year old male, with a significant PMH of NIDDM, HTN,

cervical spinal stenosis, CKD (stage 3B), and peripheral neuropathy, who was 

brought to the emergency department today by EMS from [facility], s/p near-syncopal episode secondary to his hypoglycemia. Patient 

glucose level was low earlier today, which caused his right leg to buckle 

earlier today, along with associated chills. Patient caught himself before he 

fell, and denies LOC. He reports short acting insulin was added to his daily 

medication regimen yesterday. 





The patient denies chest pain, shortness of breath, headache and dizziness.


Denies fever, nausea, vomit, diarrhea and constipation.


Denies dysuria, frequency, urgency and hematuria.





Allergies: NKA


Past surgical history: None reported 


Social history: No reported


PCP: Dr. Galeas 





01/09/19 15:22





- Physicial Exam


PE: 


GENERAL: The patient is in no acute distress.


HEAD: Normal with no signs of trauma.


EYES: PERRLA, EOMI, sclera anicteric, conjunctiva clear.


ENT: Ears normal, nares patent, oropharynx clear without exudates.


Moist mucous membranes.


NECK: Normal range of motion, supple without lymphadenopathy, JVD, or masses.


LUNGS: Breath sounds equal, clear to auscultation bilaterally. No


wheezes, and no crackles.


HEART:Regular rate and rhythm, normal S1 and S2 without murmur, rub or gallop.


ABDOMEN: Soft, nontender, normoactive bowel sounds. No guarding, no


rebound. No masses palpable.


EXTREMITIES: Normal range of motion, no edema. No clubbing or


cyanosis. No erythema, or tenderness.


NEUROLOGICAL: Cranial nerves II through XII grossly intact. Normal


speech. No focal neurological deficits.


MUSCULOSKELETAL: Back non-tender to palpation, no CVA tenderness


SKIN: Warm, Dry, normal turgor, no rashes or lesions noted.





01/09/19 16:20








- Medical Decision Making





Documentation prepared by YENIFER Us, acting as medical scribe for 

Amanda Nation MD/DO.





01/09/19 15:22








<Ashley Ventura - Last Filed: 01/09/19 16:20>





- Resident


Resident Name: Damir Bragg





- ED Attending Attestation


I have performed the following: I have examined & evaluated the patient, The 

case was reviewed & discussed with the resident, I agree w/resident's findings 

& plan, Exceptions are as noted





- Medical Decision Making





01/09/19 13:24


EKG - Twelve-lead EKG was performed and reviewed by me. There is normal sinus 

rhythm with a normal rate. The axis is normal. The intervals are normal. There 

are no ST or T wave abnormalities.





Impression: Normal twelve-lead EKG





Pt s/p pre syncopal episode


FS reportedly low


Pt brought to the ER for evaluation


Currently pt state he feels well





Pt po challenged


States he feels well





 Laboratory Tests











  09/29/18 09/29/18 01/09/19





  20:20 20:20 13:48


 


WBC  6.7   6.3


 


Hgb  14.0   12.4


 


Hct  41.4  D   35.5


 


Plt Count  188   198


 


Sodium   


 


Potassium   


 


Chloride   


 


Carbon Dioxide   


 


BUN   25 H 


 


Creatinine   2.2 H 


 


Random Glucose   38 L* 


 


Creatine Kinase   


 


Troponin I   














  01/09/19





  13:56


 


WBC 


 


Hgb 


 


Hct 


 


Plt Count 


 


Sodium  132 L


 


Potassium  4.3


 


Chloride  100


 


Carbon Dioxide  24


 


BUN  36 H


 


Creatinine  2.8 H


 


Random Glucose  235 H


 


Creatine Kinase  89


 


Troponin I  < 0.02











Renal insufficiency noted


Pt po challenged


Will plan to discharge back to the nursing facility











01/11/19 10:17








<Amanda Nation - Last Filed: 01/11/19 10:17>





**Heart Score/ECG Review





- ECG Intrepretation


Comment:: 


Normal sinus rhythm with sinus arrhythmia. Normal ECG.


01/09/19 14:15








<Ashley Ventura - Last Filed: 01/09/19 16:20>

## 2019-01-09 NOTE — EKG
Test Reason : 

Blood Pressure : ***/*** mmHG

Vent. Rate : 064 BPM     Atrial Rate : 064 BPM

   P-R Int : 160 ms          QRS Dur : 086 ms

    QT Int : 408 ms       P-R-T Axes : 032 014 057 degrees

   QTc Int : 420 ms

 

NORMAL SINUS RHYTHM WITH SINUS ARRHYTHMIA

NORMAL ECG

WHEN COMPARED WITH ECG OF 29-SEP-2018 19:59,

QT HAS SHORTENED

Confirmed by CELINE TURCIOS, VITALIY ([id number]) on 1/9/2019 3:45:44 PM

 

Referred By:             Confirmed By:VITALIY BERGER MD

## 2019-01-09 NOTE — PDOC
History of Present Illness





- General


Chief Complaint: Syncope/Near Syncope


Stated Complaint: Syncope/Near Syncope


Time Seen by Provider: 01/09/19 12:52


History Source: Patient, Nursing Home Records


Exam Limitations: No Limitations





- History of Present Illness


Initial Comments: 





01/09/19 13:30


Pt. is a 63 y.o. M w/ PMHx. of NIDDM, HTN, cervical spinal stenosis, CKD (Stage 

3b) and peripheral neuropathy presents to the ED from [facility] after a Near-syncopal episode 2/2 hypoglycemia. Pt. states that he was 

walking with with walker when his right leg buckled. Pt. denies falling or 

losing consciousness and states he was able to get to a chair under his own 

strength. He felt hot, and sweaty at that time. A nurse at the facility 

observed and checked his glucose and saw that it was low. Pt. denies receiving 

any food or drink at the facility after hearing his glucose was low. [facility] was 

called and Pt. was sent here. Pt. states that 3 weeks ago he was hospitalized 

at [facility] for an infection where his Levemir dosage was increased to 

better control glucose. Yesterday short acting insulin was added to his 

regimen. Pt. denies any symptoms at this time including: chest pain, 

lightheadedness, dizziness, diaphoresis abdominal pain or shortness of breath. 





EKG-NSR


Troponins, CBC, CMP and food were ordered for the patient. 








Timing/Duration: resolved prior to arrival


Severity: mild


Modifying Factors: improves with: movement (worsens), rest (improves)


Associated Symptoms: reports: denies symptoms


Aspirin Received prior to arrival: Yes: 81 mg x 1


Beta Blocker Taken at Home(Core Measure): Yes





Past History





- Past Medical History


Allergies/Adverse Reactions: 


 Allergies











Allergy/AdvReac Type Severity Reaction Status Date / Time


 


No Known Drug Allergies Allergy   Verified 01/09/19 12:07











Cardiac Disorders: Yes (CAD)


COPD: No


Diabetes: Yes


Hx Glaucoma: Yes


HTN: Yes


Psychiatric Problems: Yes (depression)





- Surgical History


Abdominal Surgery: No


Appendectomy: No


Cardiac Surgery: No


Cholecystectomy: No


Gastric Stapling: No


GI Surgery: No


Lung Surgery: No


Neurologic Surgery: No


Orthopedic Surgery: No





- Suicide/Smoking/Psychosocial Hx


Smoking History: Unknown if ever smoked


Have you smoked in the past 12 months: No


Hx Alcohol Use: Yes


Drug/Substance Use Hx: No


Substance Use Type: Alcohol


Hx Substance Use Treatment: No





**Review of Systems





- Review of Systems


Able to Perform ROS?: Yes


Is the patient limited English proficient: No


Constitutional: No: Symptoms Reported, Chills, Fever, Loss of Appetite, Weakness


HEENTM: No: Symptoms Reported, Recent change in vision


Respiratory: No: Symptoms reported


Cardiac (ROS): No: Symptoms Reported


ABD/GI: No: Symptoms Reported


: No: Symptoms Reported, Dysuria, Discharge, Frequency, Flank Pain, Hematuria

, Incontinence, Pain, Urgency


Musculoskeletal: No: Symptoms Reported


Integumentary: No: Symptoms Reported, Erythema


Neurological: Yes: Symptoms reported, Numbness (b/l LE up til just above the 

knees), Unsteady Gait.  No: Weakness


Psychiatric: Yes: Depression


Endocrine: No: Symptoms Reported


Hematologic/Lymphatic: No: Symptoms Reported





*Physical Exam





- Vital Signs


 Last Vital Signs











Temp Pulse Resp BP Pulse Ox


 


 98.2 F   68   18   110/68   100 


 


 01/09/19 12:14  01/09/19 12:14  01/09/19 12:14  01/09/19 12:14  01/09/19 12:14














- Physical Exam


General Appearance: Yes: Nourished, Appropriately Dressed.  No: Apparent 

Distress


HEENT: positive: MEI, Normal ENT Inspection, Normal Voice, Symmetrical, 

Pharynx Normal, Hearing Grossly Normal.  negative: Scleral Icterus (R), Scleral 

Icterus (L), Pharyngeal Erythema, Tonsillar Exudate, Tonsillar Erythema


Neck: positive: Supple.  negative: Tender


Respiratory/Chest: positive: Lungs Clear, Normal Breath Sounds.  negative: 

Chest Tender, Respiratory Distress, Accessory Muscle Use, Labored Respiration, 

Rapid RR, Crackles, Rales, Wheezing


Cardiovascular: positive: Regular Rhythm, Regular Rate, S1, S2.  negative: Edema

, JVD, Murmur


Vascular Pulses: Dorsalis-Pedis (R): 2+, Doralis-Pedis (L): 2+


Gastrointestinal/Abdominal: positive: Normal Bowel Sounds, Soft.  negative: 

Protuberent, Guarding, Rebound, Tenderness, Hernia


Male Genitalia: negative: CVAT


Rectal Exam: positive: deferred


Musculoskeletal: negative: CVA Tenderness


Extremity: positive: Normal Inspection, Normal Range of Motion, Pelvis Stable.  

negative: Tender, Coldness, Swelling, Calf Tenderness


Integumentary: positive: Normal Color, Dry, Warm, Swelling (right arm swelling 

and bruise from previous attempt at getting IV access ), Bruising.  negative: 

Ecchymosis


Neurologic: positive: Alert, Normal Mood/Affect, Normal Response, Motor 

Strength 5/5, Respond to painful stimul, Responsive, Numbness, Sensory Deficit





Moderate Sedation





- Procedure Monitoring


Vital Signs: 


Procedure Monitoring Vital Signs











Temperature  98.2 F   01/09/19 12:14


 


Pulse Rate  68   01/09/19 12:14


 


Respiratory Rate  18   01/09/19 12:14


 


Blood Pressure  110/68   01/09/19 12:14


 


O2 Sat by Pulse Oximetry (%)  100   01/09/19 12:14











ED Treatment Course





- LABORATORY


CBC & Chemistry Diagram: 


 01/09/19 13:48





 01/09/19 13:56





*DC/Admit/Observation/Transfer


Diagnosis at time of Disposition: 


 Hypoglycemia








- Discharge Dispostion


Disposition: SKILLED NURSING FACILITY


Condition at time of disposition: Improved


Decision to Admit order: No





- Referrals


Referrals: 


Adrian Galeas MD [Primary Care Provider] - 





- Patient Instructions


Printed Discharge Instructions:  DI for Syncope in Adults (Fainting)


Additional Instructions: 


You came in for low blood sugar. 


We evaluated you and found that you did not have low blood sugar. 





Please continue taking your medications as they were prescribed. 





Please follow up with your Primary Care Physician within 1 week. 





Please return to the ED if you are having fainting, shortness of breath, 

palpitations, confusion or any other concerning symptoms.   





- Post Discharge Activity

## 2019-05-28 NOTE — EKG
Test Reason : 

Blood Pressure : ***/*** mmHG

Vent. Rate : 075 BPM     Atrial Rate : 075 BPM

   P-R Int : 168 ms          QRS Dur : 094 ms

    QT Int : 416 ms       P-R-T Axes : 042 009 096 degrees

   QTc Int : 464 ms

 

NORMAL SINUS RHYTHM

INFERIOR INFARCT , AGE UNDETERMINED

ABNORMAL ECG

NO PREVIOUS ECGS AVAILABLE

Confirmed by ELVIA MCMANUS MD ([id number]) on 3/31/2017 11:45:16 AM

 

Referred By:             Confirmed By:ELVIA MCMANUS MD The skin at the access site was anesthetized.  Using an angiocath with the Modified Seldinger technique the right radial artery was succesfully accessed in a retrograde fashion over the guidewire using a Ss Kit 6fr 10cm, Flex Straight 0.021in X 45cm.

## 2022-02-24 ENCOUNTER — HOSPITAL ENCOUNTER (EMERGENCY)
Dept: HOSPITAL 74 - JER | Age: 67
Discharge: NURSING, REHAB OR LONG-TERM CARE | End: 2022-02-24
Payer: COMMERCIAL

## 2022-02-24 VITALS — TEMPERATURE: 97.9 F | DIASTOLIC BLOOD PRESSURE: 99 MMHG | SYSTOLIC BLOOD PRESSURE: 198 MMHG

## 2022-02-24 VITALS — BODY MASS INDEX: 29.3 KG/M2

## 2022-02-24 VITALS — HEART RATE: 74 BPM

## 2022-02-24 DIAGNOSIS — E11.65: ICD-10-CM
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
## 2022-02-24 DIAGNOSIS — S20.211A: Primary | ICD-10-CM

## 2022-02-24 LAB
ALBUMIN SERPL-MCNC: 3.5 G/DL (ref 3.4–5)
ALP SERPL-CCNC: 100 U/L (ref 45–117)
ALT SERPL-CCNC: 33 U/L (ref 13–61)
ANION GAP SERPL CALC-SCNC: 7 MMOL/L (ref 8–16)
AST SERPL-CCNC: 23 U/L (ref 15–37)
BASOPHILS # BLD: 0.5 % (ref 0–2)
BILIRUB SERPL-MCNC: 0.2 MG/DL (ref 0.2–1)
BUN SERPL-MCNC: 34.8 MG/DL (ref 7–18)
CALCIUM SERPL-MCNC: 8.4 MG/DL (ref 8.5–10.1)
CHLORIDE SERPL-SCNC: 106 MMOL/L (ref 98–107)
CO2 SERPL-SCNC: 25 MMOL/L (ref 21–32)
CREAT SERPL-MCNC: 2.8 MG/DL (ref 0.55–1.3)
DEPRECATED RDW RBC AUTO: 14.4 % (ref 11.9–15.9)
EOSINOPHIL # BLD: 2.9 % (ref 0–4.5)
GLUCOSE SERPL-MCNC: 452 MG/DL (ref 74–106)
HCT VFR BLD CALC: 37.7 % (ref 35.4–49)
HGB BLD-MCNC: 12.1 GM/DL (ref 11.7–16.9)
LYMPHOCYTES # BLD: 15 % (ref 8–40)
MAGNESIUM SERPL-MCNC: 2.2 MG/DL (ref 1.8–2.4)
MCH RBC QN AUTO: 31.1 PG (ref 25.7–33.7)
MCHC RBC AUTO-ENTMCNC: 32.1 G/DL (ref 32–35.9)
MCV RBC: 96.8 FL (ref 80–96)
MONOCYTES # BLD AUTO: 8 % (ref 3.8–10.2)
NEUTROPHILS # BLD: 73.6 % (ref 42.8–82.8)
PLATELET # BLD AUTO: 144 10^3/UL (ref 134–434)
PMV BLD: 10.7 FL (ref 7.5–11.1)
PROT SERPL-MCNC: 6.6 G/DL (ref 6.4–8.2)
RBC # BLD AUTO: 3.9 M/MM3 (ref 4–5.6)
SODIUM SERPL-SCNC: 138 MMOL/L (ref 136–145)
WBC # BLD AUTO: 5.1 K/MM3 (ref 4–10)

## 2022-02-24 PROCEDURE — 3E023GC INTRODUCTION OF OTHER THERAPEUTIC SUBSTANCE INTO MUSCLE, PERCUTANEOUS APPROACH: ICD-10-PCS

## 2022-05-07 ENCOUNTER — HOSPITAL ENCOUNTER (INPATIENT)
Dept: HOSPITAL 74 - JER | Age: 67
LOS: 4 days | Discharge: HOME | DRG: 637 | End: 2022-05-11
Attending: NURSE PRACTITIONER | Admitting: INTERNAL MEDICINE
Payer: COMMERCIAL

## 2022-05-07 VITALS — BODY MASS INDEX: 30.7 KG/M2

## 2022-05-07 DIAGNOSIS — I12.9: ICD-10-CM

## 2022-05-07 DIAGNOSIS — E87.5: ICD-10-CM

## 2022-05-07 DIAGNOSIS — F32.9: ICD-10-CM

## 2022-05-07 DIAGNOSIS — I25.10: ICD-10-CM

## 2022-05-07 DIAGNOSIS — J96.00: ICD-10-CM

## 2022-05-07 DIAGNOSIS — E11.22: ICD-10-CM

## 2022-05-07 DIAGNOSIS — J18.9: ICD-10-CM

## 2022-05-07 DIAGNOSIS — I24.8: ICD-10-CM

## 2022-05-07 DIAGNOSIS — N18.30: ICD-10-CM

## 2022-05-07 DIAGNOSIS — M48.02: ICD-10-CM

## 2022-05-07 DIAGNOSIS — E66.9: ICD-10-CM

## 2022-05-07 DIAGNOSIS — E11.39: ICD-10-CM

## 2022-05-07 DIAGNOSIS — E11.649: Primary | ICD-10-CM

## 2022-05-07 DIAGNOSIS — G93.41: ICD-10-CM

## 2022-05-07 DIAGNOSIS — K21.9: ICD-10-CM

## 2022-05-07 DIAGNOSIS — E11.40: ICD-10-CM

## 2022-05-07 LAB
ALBUMIN SERPL-MCNC: 4 G/DL (ref 3.4–5)
ALP SERPL-CCNC: 100 U/L (ref 45–117)
ALT SERPL-CCNC: 30 U/L (ref 13–61)
ANION GAP SERPL CALC-SCNC: 10 MMOL/L (ref 8–16)
APPEARANCE UR: CLEAR
APTT BLD: 37.1 SECONDS (ref 25.2–36.5)
AST SERPL-CCNC: 28 U/L (ref 15–37)
BACTERIA # UR AUTO: 0 /UL (ref 0–1359)
BASE EXCESS BLDV CALC-SCNC: -7.5 MMOL/L (ref -2–2)
BASOPHILS # BLD: 0.3 % (ref 0–2)
BILIRUB SERPL-MCNC: 0.3 MG/DL (ref 0.2–1)
BILIRUB UR STRIP.AUTO-MCNC: NEGATIVE MG/DL
BUN SERPL-MCNC: 35.6 MG/DL (ref 7–18)
CALCIUM SERPL-MCNC: 8.5 MG/DL (ref 8.5–10.1)
CASTS URNS QL MICRO: 0 /UL (ref 0–3.1)
CHLORIDE SERPL-SCNC: 105 MMOL/L (ref 98–107)
CO2 SERPL-SCNC: 23 MMOL/L (ref 21–32)
COLOR UR: YELLOW
CREAT SERPL-MCNC: 3 MG/DL (ref 0.55–1.3)
DEPRECATED RDW RBC AUTO: 13.8 % (ref 11.9–15.9)
EOSINOPHIL # BLD: 4 % (ref 0–4.5)
EPITH CASTS URNS QL MICRO: 6 /UL (ref 0–25.1)
GLUCOSE SERPL-MCNC: 42 MG/DL (ref 74–106)
HCT VFR BLD CALC: 37.2 % (ref 35.4–49)
HCT VFR BLDV CALC: 36 % (ref 35.4–49)
HGB BLD-MCNC: 12.5 GM/DL (ref 11.7–16.9)
INR BLD: 0.85 (ref 0.83–1.09)
KETONES UR QL STRIP: NEGATIVE
LACTATE SERPL-MCNC: 2.6 MMOL/L (ref 0.4–2)
LACTATE SERPL-MCNC: 2.7 MMOL/L (ref 0.4–2)
LEUKOCYTE ESTERASE UR QL STRIP.AUTO: NEGATIVE
LYMPHOCYTES # BLD: 27.4 % (ref 8–40)
MAGNESIUM SERPL-MCNC: 2.2 MG/DL (ref 1.8–2.4)
MCH RBC QN AUTO: 31.8 PG (ref 25.7–33.7)
MCHC RBC AUTO-ENTMCNC: 33.5 G/DL (ref 32–35.9)
MCV RBC: 94.8 FL (ref 80–96)
MONOCYTES # BLD AUTO: 8.5 % (ref 3.8–10.2)
NEUTROPHILS # BLD: 59.8 % (ref 42.8–82.8)
NITRITE UR QL STRIP: NEGATIVE
PCO2 BLDV: 43 MMHG (ref 38–52)
PH BLDV: 7.27 [PH] (ref 7.31–7.41)
PH UR: 5 [PH] (ref 5–8)
PHOSPHATE SERPL-MCNC: 3.4 MG/DL (ref 2.5–4.9)
PLATELET # BLD AUTO: 153 10^3/UL (ref 134–434)
PMV BLD: 10.5 FL (ref 7.5–11.1)
PROT SERPL-MCNC: 7.6 G/DL (ref 6.4–8.2)
PROT UR QL STRIP: (no result)
PROT UR QL STRIP: (no result)
PT PNL PPP: 9.8 SEC (ref 9.7–13)
RBC # BLD AUTO: 3.93 M/MM3 (ref 4–5.6)
RBC # BLD AUTO: 6 /UL (ref 0–23.9)
SAO2 % BLDV: 81 % (ref 70–80)
SODIUM SERPL-SCNC: 138 MMOL/L (ref 136–145)
SP GR UR: 1.01 (ref 1.01–1.03)
UROBILINOGEN UR STRIP-MCNC: 0.2 MG/DL (ref 0.2–1)
WBC # BLD AUTO: 6.5 K/MM3 (ref 4–10)
WBC # UR AUTO: 2 /UL (ref 0–25.8)

## 2022-05-07 PROCEDURE — U0003 INFECTIOUS AGENT DETECTION BY NUCLEIC ACID (DNA OR RNA); SEVERE ACUTE RESPIRATORY SYNDROME CORONAVIRUS 2 (SARS-COV-2) (CORONAVIRUS DISEASE [COVID-19]), AMPLIFIED PROBE TECHNIQUE, MAKING USE OF HIGH THROUGHPUT TECHNOLOGIES AS DESCRIBED BY CMS-2020-01-R: HCPCS

## 2022-05-07 PROCEDURE — U0005 INFEC AGEN DETEC AMPLI PROBE: HCPCS

## 2022-05-08 LAB
ALBUMIN SERPL-MCNC: 3.4 G/DL (ref 3.4–5)
ALP SERPL-CCNC: 94 U/L (ref 45–117)
ALT SERPL-CCNC: 26 U/L (ref 13–61)
AMPHET UR-MCNC: NEGATIVE NG/ML
ANION GAP SERPL CALC-SCNC: 8 MMOL/L (ref 8–16)
AST SERPL-CCNC: 20 U/L (ref 15–37)
BARBITURATES UR-MCNC: NEGATIVE UG/ML
BASOPHILS # BLD: 0.2 % (ref 0–2)
BENZODIAZ UR SCN-MCNC: NEGATIVE NG/ML
BILIRUB SERPL-MCNC: 0.4 MG/DL (ref 0.2–1)
BUN SERPL-MCNC: 34.6 MG/DL (ref 7–18)
CALCIUM SERPL-MCNC: 8.2 MG/DL (ref 8.5–10.1)
CHLORIDE SERPL-SCNC: 107 MMOL/L (ref 98–107)
CO2 SERPL-SCNC: 22 MMOL/L (ref 21–32)
COCAINE UR-MCNC: NEGATIVE NG/ML
CREAT SERPL-MCNC: 2.8 MG/DL (ref 0.55–1.3)
DEPRECATED RDW RBC AUTO: 13.8 % (ref 11.9–15.9)
EOSINOPHIL # BLD: 1.7 % (ref 0–4.5)
GLUCOSE SERPL-MCNC: 307 MG/DL (ref 74–106)
HCT VFR BLD CALC: 36.8 % (ref 35.4–49)
HGB BLD-MCNC: 12 GM/DL (ref 11.7–16.9)
LYMPHOCYTES # BLD: 9.1 % (ref 8–40)
MAGNESIUM SERPL-MCNC: 2.1 MG/DL (ref 1.8–2.4)
MCH RBC QN AUTO: 31.4 PG (ref 25.7–33.7)
MCHC RBC AUTO-ENTMCNC: 32.5 G/DL (ref 32–35.9)
MCV RBC: 96.4 FL (ref 80–96)
METHADONE UR-MCNC: NEGATIVE UG/L
MONOCYTES # BLD AUTO: 6.3 % (ref 3.8–10.2)
NEUTROPHILS # BLD: 82.7 % (ref 42.8–82.8)
OPIATES UR QL SCN: NEGATIVE
PCP UR QL SCN: NEGATIVE
PHOSPHATE SERPL-MCNC: 2.7 MG/DL (ref 2.5–4.9)
PLATELET # BLD AUTO: 160 10^3/UL (ref 134–434)
PMV BLD: 10.4 FL (ref 7.5–11.1)
PROT SERPL-MCNC: 6.9 G/DL (ref 6.4–8.2)
RBC # BLD AUTO: 3.81 M/MM3 (ref 4–5.6)
SODIUM SERPL-SCNC: 137 MMOL/L (ref 136–145)
WBC # BLD AUTO: 8.8 K/MM3 (ref 4–10)

## 2022-05-08 RX ADMIN — HEPARIN SODIUM SCH UNIT: 5000 INJECTION, SOLUTION INTRAVENOUS; SUBCUTANEOUS at 15:06

## 2022-05-08 RX ADMIN — PRAMIPEXOLE DIHYDROCHLORIDE SCH MG: 0.25 TABLET ORAL at 21:51

## 2022-05-08 RX ADMIN — HEPARIN SODIUM SCH UNIT: 5000 INJECTION, SOLUTION INTRAVENOUS; SUBCUTANEOUS at 21:51

## 2022-05-08 RX ADMIN — VITAMIN D, TAB 1000IU (100/BT) SCH UNIT: 25 TAB at 11:07

## 2022-05-08 RX ADMIN — INSULIN ASPART SCH: 100 INJECTION, SOLUTION INTRAVENOUS; SUBCUTANEOUS at 11:46

## 2022-05-08 RX ADMIN — PIPERACILLIN SODIUM AND TAZOBACTAM SODIUM SCH: .25; 2 INJECTION, POWDER, LYOPHILIZED, FOR SOLUTION INTRAVENOUS at 12:53

## 2022-05-08 RX ADMIN — PIPERACILLIN SODIUM AND TAZOBACTAM SODIUM SCH MLS/HR: .25; 2 INJECTION, POWDER, LYOPHILIZED, FOR SOLUTION INTRAVENOUS at 03:30

## 2022-05-08 RX ADMIN — FAMOTIDINE SCH MG: 20 TABLET ORAL at 11:06

## 2022-05-08 RX ADMIN — AMLODIPINE BESYLATE SCH MG: 5 TABLET ORAL at 11:07

## 2022-05-08 RX ADMIN — HEPARIN SODIUM SCH UNIT: 5000 INJECTION, SOLUTION INTRAVENOUS; SUBCUTANEOUS at 06:28

## 2022-05-08 RX ADMIN — CEFTRIAXONE SCH MLS/HR: 1 INJECTION, POWDER, FOR SOLUTION INTRAMUSCULAR; INTRAVENOUS at 15:06

## 2022-05-08 RX ADMIN — POLYVINYL ALCOHOL SCH DROP: 14 SOLUTION/ DROPS OPHTHALMIC at 21:52

## 2022-05-08 RX ADMIN — OXYCODONE HYDROCHLORIDE AND ACETAMINOPHEN SCH MG: 500 TABLET ORAL at 11:07

## 2022-05-08 RX ADMIN — INSULIN ASPART SCH UNITS: 100 INJECTION, SOLUTION INTRAVENOUS; SUBCUTANEOUS at 17:10

## 2022-05-08 RX ADMIN — LORATADINE SCH MG: 10 TABLET ORAL at 11:08

## 2022-05-08 RX ADMIN — LATANOPROST SCH DROP: 50 SOLUTION OPHTHALMIC at 22:24

## 2022-05-08 RX ADMIN — PREGABALIN SCH MG: 50 CAPSULE ORAL at 11:08

## 2022-05-08 RX ADMIN — PREGABALIN SCH MG: 50 CAPSULE ORAL at 21:51

## 2022-05-08 RX ADMIN — INSULIN ASPART SCH: 100 INJECTION, SOLUTION INTRAVENOUS; SUBCUTANEOUS at 22:22

## 2022-05-08 RX ADMIN — MULTIVITAMIN TABLET SCH TAB: TABLET at 11:06

## 2022-05-08 RX ADMIN — POLYVINYL ALCOHOL SCH: 14 SOLUTION/ DROPS OPHTHALMIC at 11:19

## 2022-05-08 RX ADMIN — INSULIN ASPART SCH UNITS: 100 INJECTION, SOLUTION INTRAVENOUS; SUBCUTANEOUS at 06:29

## 2022-05-08 RX ADMIN — ASPIRIN 81 MG SCH MG: 81 TABLET ORAL at 11:06

## 2022-05-08 RX ADMIN — SODIUM ZIRCONIUM CYCLOSILICATE SCH GM: 5 POWDER, FOR SUSPENSION ORAL at 11:08

## 2022-05-08 RX ADMIN — PRAMIPEXOLE DIHYDROCHLORIDE SCH MG: 0.25 TABLET ORAL at 11:08

## 2022-05-08 RX ADMIN — PANTOPRAZOLE SODIUM SCH MG: 40 TABLET, DELAYED RELEASE ORAL at 11:06

## 2022-05-08 RX ADMIN — CALCITRIOL SCH MCG: 0.25 CAPSULE ORAL at 11:07

## 2022-05-09 LAB
ALBUMIN SERPL-MCNC: 3.1 G/DL (ref 3.4–5)
ALP SERPL-CCNC: 91 U/L (ref 45–117)
ALT SERPL-CCNC: 21 U/L (ref 13–61)
ANION GAP SERPL CALC-SCNC: 5 MMOL/L (ref 8–16)
AST SERPL-CCNC: 17 U/L (ref 15–37)
BASOPHILS # BLD: 0.4 % (ref 0–2)
BILIRUB SERPL-MCNC: 0.5 MG/DL (ref 0.2–1)
BUN SERPL-MCNC: 33.1 MG/DL (ref 7–18)
CALCIUM SERPL-MCNC: 8.4 MG/DL (ref 8.5–10.1)
CHLORIDE SERPL-SCNC: 106 MMOL/L (ref 98–107)
CO2 SERPL-SCNC: 25 MMOL/L (ref 21–32)
CREAT SERPL-MCNC: 2.8 MG/DL (ref 0.55–1.3)
DEPRECATED RDW RBC AUTO: 14 % (ref 11.9–15.9)
EOSINOPHIL # BLD: 4.7 % (ref 0–4.5)
GLUCOSE SERPL-MCNC: 262 MG/DL (ref 74–106)
HCT VFR BLD CALC: 35.4 % (ref 35.4–49)
HGB BLD-MCNC: 12 GM/DL (ref 11.7–16.9)
LYMPHOCYTES # BLD: 15.2 % (ref 8–40)
MAGNESIUM SERPL-MCNC: 2 MG/DL (ref 1.8–2.4)
MCH RBC QN AUTO: 32.3 PG (ref 25.7–33.7)
MCHC RBC AUTO-ENTMCNC: 33.9 G/DL (ref 32–35.9)
MCV RBC: 95 FL (ref 80–96)
MONOCYTES # BLD AUTO: 11 % (ref 3.8–10.2)
NEUTROPHILS # BLD: 68.7 % (ref 42.8–82.8)
PHOSPHATE SERPL-MCNC: 3 MG/DL (ref 2.5–4.9)
PLATELET # BLD AUTO: 148 10^3/UL (ref 134–434)
PMV BLD: 10.4 FL (ref 7.5–11.1)
PROT SERPL-MCNC: 6.4 G/DL (ref 6.4–8.2)
RBC # BLD AUTO: 3.73 M/MM3 (ref 4–5.6)
SODIUM SERPL-SCNC: 136 MMOL/L (ref 136–145)
WBC # BLD AUTO: 4.8 K/MM3 (ref 4–10)

## 2022-05-09 RX ADMIN — PANTOPRAZOLE SODIUM SCH MG: 40 TABLET, DELAYED RELEASE ORAL at 09:38

## 2022-05-09 RX ADMIN — HEPARIN SODIUM SCH UNIT: 5000 INJECTION, SOLUTION INTRAVENOUS; SUBCUTANEOUS at 13:28

## 2022-05-09 RX ADMIN — INSULIN ASPART SCH UNITS: 100 INJECTION, SOLUTION INTRAVENOUS; SUBCUTANEOUS at 21:32

## 2022-05-09 RX ADMIN — HEPARIN SODIUM SCH UNIT: 5000 INJECTION, SOLUTION INTRAVENOUS; SUBCUTANEOUS at 21:30

## 2022-05-09 RX ADMIN — LORATADINE SCH MG: 10 TABLET ORAL at 09:38

## 2022-05-09 RX ADMIN — CEFTRIAXONE SCH MLS/HR: 1 INJECTION, POWDER, FOR SOLUTION INTRAMUSCULAR; INTRAVENOUS at 09:39

## 2022-05-09 RX ADMIN — INSULIN ASPART SCH UNITS: 100 INJECTION, SOLUTION INTRAVENOUS; SUBCUTANEOUS at 11:23

## 2022-05-09 RX ADMIN — AMLODIPINE BESYLATE SCH MG: 5 TABLET ORAL at 09:38

## 2022-05-09 RX ADMIN — HEPARIN SODIUM SCH UNIT: 5000 INJECTION, SOLUTION INTRAVENOUS; SUBCUTANEOUS at 05:40

## 2022-05-09 RX ADMIN — SODIUM ZIRCONIUM CYCLOSILICATE SCH GM: 5 POWDER, FOR SUSPENSION ORAL at 09:38

## 2022-05-09 RX ADMIN — PREGABALIN SCH MG: 50 CAPSULE ORAL at 09:38

## 2022-05-09 RX ADMIN — ASPIRIN 81 MG SCH MG: 81 TABLET ORAL at 09:38

## 2022-05-09 RX ADMIN — AZITHROMYCIN DIHYDRATE SCH MLS/HR: 500 INJECTION, POWDER, LYOPHILIZED, FOR SOLUTION INTRAVENOUS at 10:30

## 2022-05-09 RX ADMIN — INSULIN DETEMIR SCH UNITS: 100 INJECTION, SOLUTION SUBCUTANEOUS at 21:28

## 2022-05-09 RX ADMIN — PRAMIPEXOLE DIHYDROCHLORIDE SCH MG: 0.25 TABLET ORAL at 21:29

## 2022-05-09 RX ADMIN — INSULIN ASPART SCH UNITS: 100 INJECTION, SOLUTION INTRAVENOUS; SUBCUTANEOUS at 06:08

## 2022-05-09 RX ADMIN — POLYVINYL ALCOHOL SCH DROP: 14 SOLUTION/ DROPS OPHTHALMIC at 21:30

## 2022-05-09 RX ADMIN — OXYCODONE HYDROCHLORIDE AND ACETAMINOPHEN SCH MG: 500 TABLET ORAL at 09:38

## 2022-05-09 RX ADMIN — PREGABALIN SCH MG: 50 CAPSULE ORAL at 21:29

## 2022-05-09 RX ADMIN — VITAMIN D, TAB 1000IU (100/BT) SCH UNIT: 25 TAB at 09:38

## 2022-05-09 RX ADMIN — LATANOPROST SCH DROP: 50 SOLUTION OPHTHALMIC at 21:30

## 2022-05-09 RX ADMIN — CALCITRIOL SCH MCG: 0.25 CAPSULE ORAL at 09:37

## 2022-05-09 RX ADMIN — SODIUM ZIRCONIUM CYCLOSILICATE SCH GM: 5 POWDER, FOR SUSPENSION ORAL at 21:29

## 2022-05-09 RX ADMIN — INSULIN ASPART SCH UNITS: 100 INJECTION, SOLUTION INTRAVENOUS; SUBCUTANEOUS at 18:18

## 2022-05-09 RX ADMIN — PRAMIPEXOLE DIHYDROCHLORIDE SCH MG: 0.25 TABLET ORAL at 09:38

## 2022-05-09 RX ADMIN — MULTIVITAMIN TABLET SCH TAB: TABLET at 09:38

## 2022-05-09 RX ADMIN — FAMOTIDINE SCH MG: 20 TABLET ORAL at 09:38

## 2022-05-09 RX ADMIN — POLYVINYL ALCOHOL SCH DROP: 14 SOLUTION/ DROPS OPHTHALMIC at 09:39

## 2022-05-10 LAB
ALBUMIN SERPL-MCNC: 3.1 G/DL (ref 3.4–5)
ALP SERPL-CCNC: 91 U/L (ref 45–117)
ALT SERPL-CCNC: 22 U/L (ref 13–61)
ANION GAP SERPL CALC-SCNC: 6 MMOL/L (ref 8–16)
AST SERPL-CCNC: 19 U/L (ref 15–37)
BASOPHILS # BLD: 0.6 % (ref 0–2)
BILIRUB SERPL-MCNC: 0.4 MG/DL (ref 0.2–1)
BNP SERPL-MCNC: 2043 PG/ML (ref 5–125)
BUN SERPL-MCNC: 32.5 MG/DL (ref 7–18)
CALCIUM SERPL-MCNC: 8.5 MG/DL (ref 8.5–10.1)
CHLORIDE SERPL-SCNC: 106 MMOL/L (ref 98–107)
CHOLEST SERPL-MCNC: 105 MG/DL (ref 50–200)
CO2 SERPL-SCNC: 25 MMOL/L (ref 21–32)
CREAT SERPL-MCNC: 2.9 MG/DL (ref 0.55–1.3)
DEPRECATED RDW RBC AUTO: 13.8 % (ref 11.9–15.9)
EOSINOPHIL # BLD: 6.2 % (ref 0–4.5)
GLUCOSE SERPL-MCNC: 189 MG/DL (ref 74–106)
HCT VFR BLD CALC: 37.2 % (ref 35.4–49)
HDLC SERPL-MCNC: 43 MG/DL (ref 40–60)
HGB BLD-MCNC: 12.1 GM/DL (ref 11.7–16.9)
LDLC SERPL CALC-MCNC: 55 MG/DL (ref 5–100)
LYMPHOCYTES # BLD: 19.5 % (ref 8–40)
MAGNESIUM SERPL-MCNC: 2 MG/DL (ref 1.8–2.4)
MCH RBC QN AUTO: 31.3 PG (ref 25.7–33.7)
MCHC RBC AUTO-ENTMCNC: 32.5 G/DL (ref 32–35.9)
MCV RBC: 96.2 FL (ref 80–96)
MONOCYTES # BLD AUTO: 13.6 % (ref 3.8–10.2)
NEUTROPHILS # BLD: 60.1 % (ref 42.8–82.8)
PLATELET # BLD AUTO: 152 10^3/UL (ref 134–434)
PMV BLD: 10.3 FL (ref 7.5–11.1)
PROT SERPL-MCNC: 6.6 G/DL (ref 6.4–8.2)
RBC # BLD AUTO: 3.87 M/MM3 (ref 4–5.6)
SODIUM SERPL-SCNC: 137 MMOL/L (ref 136–145)
TRIGL SERPL-MCNC: 145 MG/DL (ref 0–150)
WBC # BLD AUTO: 4.8 K/MM3 (ref 4–10)

## 2022-05-10 RX ADMIN — PRAMIPEXOLE DIHYDROCHLORIDE SCH MG: 0.25 TABLET ORAL at 10:18

## 2022-05-10 RX ADMIN — MULTIVITAMIN TABLET SCH TAB: TABLET at 10:18

## 2022-05-10 RX ADMIN — FAMOTIDINE SCH MG: 20 TABLET ORAL at 10:18

## 2022-05-10 RX ADMIN — INSULIN DETEMIR SCH UNITS: 100 INJECTION, SOLUTION SUBCUTANEOUS at 21:33

## 2022-05-10 RX ADMIN — PREGABALIN SCH MG: 50 CAPSULE ORAL at 21:11

## 2022-05-10 RX ADMIN — ASPIRIN 81 MG SCH MG: 81 TABLET ORAL at 10:18

## 2022-05-10 RX ADMIN — SODIUM ZIRCONIUM CYCLOSILICATE SCH GM: 5 POWDER, FOR SUSPENSION ORAL at 10:24

## 2022-05-10 RX ADMIN — HEPARIN SODIUM SCH UNIT: 5000 INJECTION, SOLUTION INTRAVENOUS; SUBCUTANEOUS at 14:12

## 2022-05-10 RX ADMIN — INSULIN ASPART SCH UNITS: 100 INJECTION, SOLUTION INTRAVENOUS; SUBCUTANEOUS at 21:34

## 2022-05-10 RX ADMIN — INSULIN ASPART SCH: 100 INJECTION, SOLUTION INTRAVENOUS; SUBCUTANEOUS at 06:39

## 2022-05-10 RX ADMIN — POLYVINYL ALCOHOL SCH DROP: 14 SOLUTION/ DROPS OPHTHALMIC at 21:17

## 2022-05-10 RX ADMIN — INSULIN ASPART SCH UNITS: 100 INJECTION, SOLUTION INTRAVENOUS; SUBCUTANEOUS at 11:39

## 2022-05-10 RX ADMIN — INSULIN ASPART SCH: 100 INJECTION, SOLUTION INTRAVENOUS; SUBCUTANEOUS at 16:47

## 2022-05-10 RX ADMIN — LATANOPROST SCH DROP: 50 SOLUTION OPHTHALMIC at 21:17

## 2022-05-10 RX ADMIN — POLYVINYL ALCOHOL SCH DROP: 14 SOLUTION/ DROPS OPHTHALMIC at 10:19

## 2022-05-10 RX ADMIN — AZITHROMYCIN DIHYDRATE SCH MLS/HR: 500 INJECTION, POWDER, LYOPHILIZED, FOR SOLUTION INTRAVENOUS at 10:20

## 2022-05-10 RX ADMIN — SODIUM ZIRCONIUM CYCLOSILICATE SCH GM: 5 POWDER, FOR SUSPENSION ORAL at 21:12

## 2022-05-10 RX ADMIN — PREGABALIN SCH MG: 50 CAPSULE ORAL at 10:18

## 2022-05-10 RX ADMIN — OXYCODONE HYDROCHLORIDE AND ACETAMINOPHEN SCH MG: 500 TABLET ORAL at 10:18

## 2022-05-10 RX ADMIN — PANTOPRAZOLE SODIUM SCH MG: 40 TABLET, DELAYED RELEASE ORAL at 10:18

## 2022-05-10 RX ADMIN — CALCITRIOL SCH MCG: 0.25 CAPSULE ORAL at 10:18

## 2022-05-10 RX ADMIN — PRAMIPEXOLE DIHYDROCHLORIDE SCH MG: 0.25 TABLET ORAL at 21:11

## 2022-05-10 RX ADMIN — VITAMIN D, TAB 1000IU (100/BT) SCH UNIT: 25 TAB at 10:18

## 2022-05-10 RX ADMIN — AMLODIPINE BESYLATE SCH MG: 5 TABLET ORAL at 10:18

## 2022-05-10 RX ADMIN — HEPARIN SODIUM SCH UNIT: 5000 INJECTION, SOLUTION INTRAVENOUS; SUBCUTANEOUS at 21:12

## 2022-05-10 RX ADMIN — CEFTRIAXONE SCH MLS/HR: 1 INJECTION, POWDER, FOR SOLUTION INTRAMUSCULAR; INTRAVENOUS at 10:19

## 2022-05-10 RX ADMIN — LORATADINE SCH MG: 10 TABLET ORAL at 10:18

## 2022-05-10 RX ADMIN — HEPARIN SODIUM SCH UNIT: 5000 INJECTION, SOLUTION INTRAVENOUS; SUBCUTANEOUS at 06:38

## 2022-05-11 VITALS — SYSTOLIC BLOOD PRESSURE: 146 MMHG | DIASTOLIC BLOOD PRESSURE: 78 MMHG | HEART RATE: 61 BPM | TEMPERATURE: 98.5 F

## 2022-05-11 LAB
ALBUMIN SERPL-MCNC: 3 G/DL (ref 3.4–5)
ALP SERPL-CCNC: 86 U/L (ref 45–117)
ALT SERPL-CCNC: 22 U/L (ref 13–61)
ANION GAP SERPL CALC-SCNC: 9 MMOL/L (ref 8–16)
AST SERPL-CCNC: 18 U/L (ref 15–37)
BASOPHILS # BLD: 0.7 % (ref 0–2)
BILIRUB SERPL-MCNC: 0.4 MG/DL (ref 0.2–1)
BUN SERPL-MCNC: 30 MG/DL (ref 7–18)
CALCIUM SERPL-MCNC: 8.4 MG/DL (ref 8.5–10.1)
CHLORIDE SERPL-SCNC: 104 MMOL/L (ref 98–107)
CO2 SERPL-SCNC: 26 MMOL/L (ref 21–32)
CREAT SERPL-MCNC: 2.8 MG/DL (ref 0.55–1.3)
DEPRECATED RDW RBC AUTO: 13.9 % (ref 11.9–15.9)
EOSINOPHIL # BLD: 5.1 % (ref 0–4.5)
GLUCOSE SERPL-MCNC: 128 MG/DL (ref 74–106)
HCT VFR BLD CALC: 36.4 % (ref 35.4–49)
HGB BLD-MCNC: 12.1 GM/DL (ref 11.7–16.9)
LYMPHOCYTES # BLD: 23.2 % (ref 8–40)
MAGNESIUM SERPL-MCNC: 2 MG/DL (ref 1.8–2.4)
MCH RBC QN AUTO: 31.6 PG (ref 25.7–33.7)
MCHC RBC AUTO-ENTMCNC: 33.2 G/DL (ref 32–35.9)
MCV RBC: 95.1 FL (ref 80–96)
MONOCYTES # BLD AUTO: 11.6 % (ref 3.8–10.2)
NEUTROPHILS # BLD: 59.4 % (ref 42.8–82.8)
PLATELET # BLD AUTO: 166 10^3/UL (ref 134–434)
PMV BLD: 9.8 FL (ref 7.5–11.1)
PROT SERPL-MCNC: 6.4 G/DL (ref 6.4–8.2)
RBC # BLD AUTO: 3.83 M/MM3 (ref 4–5.6)
SODIUM SERPL-SCNC: 139 MMOL/L (ref 136–145)
WBC # BLD AUTO: 4.9 K/MM3 (ref 4–10)

## 2022-05-11 RX ADMIN — FAMOTIDINE SCH MG: 20 TABLET ORAL at 10:07

## 2022-05-11 RX ADMIN — ASPIRIN 81 MG SCH MG: 81 TABLET ORAL at 10:08

## 2022-05-11 RX ADMIN — AMLODIPINE BESYLATE SCH MG: 5 TABLET ORAL at 10:08

## 2022-05-11 RX ADMIN — PREGABALIN SCH MG: 50 CAPSULE ORAL at 10:07

## 2022-05-11 RX ADMIN — MULTIVITAMIN TABLET SCH TAB: TABLET at 10:08

## 2022-05-11 RX ADMIN — AZITHROMYCIN DIHYDRATE SCH MLS/HR: 500 INJECTION, POWDER, LYOPHILIZED, FOR SOLUTION INTRAVENOUS at 10:06

## 2022-05-11 RX ADMIN — INSULIN ASPART SCH: 100 INJECTION, SOLUTION INTRAVENOUS; SUBCUTANEOUS at 11:53

## 2022-05-11 RX ADMIN — HEPARIN SODIUM SCH UNIT: 5000 INJECTION, SOLUTION INTRAVENOUS; SUBCUTANEOUS at 05:54

## 2022-05-11 RX ADMIN — POLYVINYL ALCOHOL SCH DROP: 14 SOLUTION/ DROPS OPHTHALMIC at 10:08

## 2022-05-11 RX ADMIN — VITAMIN D, TAB 1000IU (100/BT) SCH UNIT: 25 TAB at 10:07

## 2022-05-11 RX ADMIN — PANTOPRAZOLE SODIUM SCH MG: 40 TABLET, DELAYED RELEASE ORAL at 10:08

## 2022-05-11 RX ADMIN — INSULIN ASPART SCH: 100 INJECTION, SOLUTION INTRAVENOUS; SUBCUTANEOUS at 06:02

## 2022-05-11 RX ADMIN — CALCITRIOL SCH MCG: 0.25 CAPSULE ORAL at 10:08

## 2022-05-11 RX ADMIN — SODIUM ZIRCONIUM CYCLOSILICATE SCH: 5 POWDER, FOR SUSPENSION ORAL at 10:09

## 2022-05-11 RX ADMIN — OXYCODONE HYDROCHLORIDE AND ACETAMINOPHEN SCH MG: 500 TABLET ORAL at 10:07

## 2022-05-11 RX ADMIN — CEFTRIAXONE SCH MLS/HR: 1 INJECTION, POWDER, FOR SOLUTION INTRAMUSCULAR; INTRAVENOUS at 10:06

## 2022-05-11 RX ADMIN — PRAMIPEXOLE DIHYDROCHLORIDE SCH MG: 0.25 TABLET ORAL at 10:07

## 2022-05-11 RX ADMIN — LORATADINE SCH MG: 10 TABLET ORAL at 10:08

## 2022-08-20 ENCOUNTER — HOSPITAL ENCOUNTER (INPATIENT)
Dept: HOSPITAL 74 - JER | Age: 67
LOS: 30 days | Discharge: NURSING, REHAB OR LONG-TERM CARE | DRG: 4 | End: 2022-09-19
Attending: INTERNAL MEDICINE | Admitting: INTERNAL MEDICINE
Payer: COMMERCIAL

## 2022-08-20 VITALS — BODY MASS INDEX: 32.6 KG/M2

## 2022-08-20 DIAGNOSIS — I25.10: ICD-10-CM

## 2022-08-20 DIAGNOSIS — Z79.4: ICD-10-CM

## 2022-08-20 DIAGNOSIS — E11.43: ICD-10-CM

## 2022-08-20 DIAGNOSIS — G93.41: ICD-10-CM

## 2022-08-20 DIAGNOSIS — K31.84: ICD-10-CM

## 2022-08-20 DIAGNOSIS — K21.9: ICD-10-CM

## 2022-08-20 DIAGNOSIS — E87.5: ICD-10-CM

## 2022-08-20 DIAGNOSIS — E83.42: ICD-10-CM

## 2022-08-20 DIAGNOSIS — D64.9: ICD-10-CM

## 2022-08-20 DIAGNOSIS — I12.9: ICD-10-CM

## 2022-08-20 DIAGNOSIS — J96.01: ICD-10-CM

## 2022-08-20 DIAGNOSIS — J69.0: ICD-10-CM

## 2022-08-20 DIAGNOSIS — R65.21: ICD-10-CM

## 2022-08-20 DIAGNOSIS — D69.6: ICD-10-CM

## 2022-08-20 DIAGNOSIS — F32.A: ICD-10-CM

## 2022-08-20 DIAGNOSIS — K56.7: ICD-10-CM

## 2022-08-20 DIAGNOSIS — H40.9: ICD-10-CM

## 2022-08-20 DIAGNOSIS — N17.9: ICD-10-CM

## 2022-08-20 DIAGNOSIS — E87.2: ICD-10-CM

## 2022-08-20 DIAGNOSIS — E11.40: ICD-10-CM

## 2022-08-20 DIAGNOSIS — E11.22: ICD-10-CM

## 2022-08-20 DIAGNOSIS — A41.9: Primary | ICD-10-CM

## 2022-08-20 DIAGNOSIS — I45.10: ICD-10-CM

## 2022-08-20 DIAGNOSIS — N18.4: ICD-10-CM

## 2022-08-20 LAB
ALBUMIN SERPL-MCNC: 3.8 G/DL (ref 3.4–5)
ALP SERPL-CCNC: 80 U/L (ref 45–117)
ALT SERPL-CCNC: 22 U/L (ref 13–61)
ANION GAP SERPL CALC-SCNC: 11 MMOL/L (ref 8–16)
APPEARANCE UR: CLEAR
APTT BLD: 33.7 SECONDS (ref 25.2–36.5)
ARTERIAL PATENCY WRIST A: POSITIVE
AST SERPL-CCNC: 25 U/L (ref 15–37)
BACTERIA # UR AUTO: 15 /UL (ref 0–1359)
BASE EXCESS BLDA CALC-SCNC: -6.1 MMOL/L (ref -2–2)
BASE EXCESS BLDV CALC-SCNC: -6.5 MMOL/L (ref -2–2)
BASOPHILS # BLD: 0.4 % (ref 0–2)
BILIRUB SERPL-MCNC: 0.5 MG/DL (ref 0.2–1)
BILIRUB UR STRIP.AUTO-MCNC: NEGATIVE MG/DL
BODY TEMPERATURE: 97.6
BREATHS.MECHANICAL ON VENT: 16
BUN SERPL-MCNC: 42.6 MG/DL (ref 7–18)
CALCIUM SERPL-MCNC: 8.1 MG/DL (ref 8.5–10.1)
CASTS URNS QL MICRO: 0 /UL (ref 0–3.1)
CHLORIDE SERPL-SCNC: 103 MMOL/L (ref 98–107)
CO2 SERPL-SCNC: 21 MMOL/L (ref 21–32)
COLOR UR: YELLOW
CREAT SERPL-MCNC: 3.5 MG/DL (ref 0.55–1.3)
DEPRECATED RDW RBC AUTO: 14.3 % (ref 11.9–15.9)
DEPRECATED RDW RBC AUTO: 14.3 % (ref 11.9–15.9)
EOSINOPHIL # BLD: 2.5 % (ref 0–4.5)
EPITH CASTS URNS QL MICRO: 3 /UL (ref 0–25.1)
GLUCOSE SERPL-MCNC: 304 MG/DL (ref 74–106)
HCT VFR BLD CALC: 33.4 % (ref 35.4–49)
HCT VFR BLD CALC: 33.7 % (ref 35.4–49)
HCT VFR BLDV CALC: 31 % (ref 35.4–49)
HCT VFR BLDV CALC: 34 % (ref 35.4–49)
HGB BLD-MCNC: 10.9 GM/DL (ref 11.7–16.9)
HGB BLD-MCNC: 11.1 GM/DL (ref 11.7–16.9)
INR BLD: 0.96 (ref 0.83–1.09)
KETONES UR QL STRIP: NEGATIVE
LACTATE SERPL-MCNC: 2.4 MMOL/L (ref 0.4–2)
LEUKOCYTE ESTERASE UR QL STRIP.AUTO: NEGATIVE
LYMPHOCYTES # BLD: 21.9 % (ref 8–40)
MCH RBC QN AUTO: 31.6 PG (ref 25.7–33.7)
MCH RBC QN AUTO: 32 PG (ref 25.7–33.7)
MCHC RBC AUTO-ENTMCNC: 32.6 G/DL (ref 32–35.9)
MCHC RBC AUTO-ENTMCNC: 33 G/DL (ref 32–35.9)
MCV RBC: 96.9 FL (ref 80–96)
MCV RBC: 96.9 FL (ref 80–96)
MONOCYTES # BLD AUTO: 8.8 % (ref 3.8–10.2)
NEUTROPHILS # BLD: 66.4 % (ref 42.8–82.8)
NITRITE UR QL STRIP: NEGATIVE
PCO2 BLDA: 49.3 MMHG (ref 35–45)
PCO2 BLDV: 36.1 MMHG (ref 38–52)
PH BLDV: 7.33 [PH] (ref 7.31–7.41)
PH UR: 5.5 [PH] (ref 5–8)
PLATELET # BLD AUTO: 140 10^3/UL (ref 134–434)
PLATELET # BLD AUTO: 148 10^3/UL (ref 134–434)
PMV BLD: 10.2 FL (ref 7.5–11.1)
PMV BLD: 10.5 FL (ref 7.5–11.1)
PO2 BLDA: 107.4 MMHG (ref 80–100)
PROT SERPL-MCNC: 7.3 G/DL (ref 6.4–8.2)
PROT UR QL STRIP: (no result)
PROT UR QL STRIP: (no result)
PT PNL PPP: 11 SEC (ref 9.7–13)
RBC # BLD AUTO: 3.45 M/MM3 (ref 4–5.6)
RBC # BLD AUTO: 3.48 M/MM3 (ref 4–5.6)
RBC # BLD AUTO: 6 /UL (ref 0–23.9)
SAO2 % BLDA: 97.1 % (ref 95–98)
SAO2 % BLDV: 85.7 % (ref 70–80)
SODIUM SERPL-SCNC: 136 MMOL/L (ref 136–145)
SP GR UR: 1.02 (ref 1.01–1.03)
UROBILINOGEN UR STRIP-MCNC: 0.2 MG/DL (ref 0.2–1)
VENTILATION MODE VENT: (no result)
WBC # BLD AUTO: 6.5 K/MM3 (ref 4–10)
WBC # BLD AUTO: 7.4 K/MM3 (ref 4–10)
WBC # UR AUTO: 28 /UL (ref 0–25.8)

## 2022-08-20 PROCEDURE — 5A1955Z RESPIRATORY VENTILATION, GREATER THAN 96 CONSECUTIVE HOURS: ICD-10-PCS

## 2022-08-20 PROCEDURE — P9047 ALBUMIN (HUMAN), 25%, 50ML: HCPCS

## 2022-08-20 PROCEDURE — 0BH17EZ INSERTION OF ENDOTRACHEAL AIRWAY INTO TRACHEA, VIA NATURAL OR ARTIFICIAL OPENING: ICD-10-PCS

## 2022-08-20 PROCEDURE — U0005 INFEC AGEN DETEC AMPLI PROBE: HCPCS

## 2022-08-20 PROCEDURE — U0003 INFECTIOUS AGENT DETECTION BY NUCLEIC ACID (DNA OR RNA); SEVERE ACUTE RESPIRATORY SYNDROME CORONAVIRUS 2 (SARS-COV-2) (CORONAVIRUS DISEASE [COVID-19]), AMPLIFIED PROBE TECHNIQUE, MAKING USE OF HIGH THROUGHPUT TECHNOLOGIES AS DESCRIBED BY CMS-2020-01-R: HCPCS

## 2022-08-20 RX ADMIN — LATANOPROST SCH DROP: 50 SOLUTION OPHTHALMIC at 21:26

## 2022-08-20 RX ADMIN — FENTANYL CITRATE SCH MLS/HR: 0.05 INJECTION, SOLUTION INTRAMUSCULAR; INTRAVENOUS at 17:00

## 2022-08-20 RX ADMIN — PIPERACILLIN SODIUM AND TAZOBACTAM SODIUM SCH MLS/HR: .25; 2 INJECTION, POWDER, LYOPHILIZED, FOR SOLUTION INTRAVENOUS at 17:50

## 2022-08-21 LAB
AMPHET UR-MCNC: NEGATIVE NG/ML
ANION GAP SERPL CALC-SCNC: 9 MMOL/L (ref 8–16)
ARTERIAL PATENCY WRIST A: POSITIVE
BARBITURATES UR-MCNC: NEGATIVE UG/ML
BASE EXCESS BLDA CALC-SCNC: -6.2 MMOL/L (ref -2–2)
BENZODIAZ UR SCN-MCNC: POSITIVE NG/ML
BREATHS.MECHANICAL ON VENT: 22
BUN SERPL-MCNC: 48.4 MG/DL (ref 7–18)
CALCIUM SERPL-MCNC: 7.5 MG/DL (ref 8.5–10.1)
CHLORIDE SERPL-SCNC: 107 MMOL/L (ref 98–107)
CO2 SERPL-SCNC: 22 MMOL/L (ref 21–32)
COCAINE UR-MCNC: NEGATIVE NG/ML
CREAT SERPL-MCNC: 3.5 MG/DL (ref 0.55–1.3)
DEPRECATED RDW RBC AUTO: 14.4 % (ref 11.9–15.9)
GLUCOSE SERPL-MCNC: 310 MG/DL (ref 74–106)
HCT VFR BLD CALC: 30.4 % (ref 35.4–49)
HCT VFR BLDV CALC: 34 % (ref 35.4–49)
HGB BLD-MCNC: 10.2 GM/DL (ref 11.7–16.9)
MAGNESIUM SERPL-MCNC: 2 MG/DL (ref 1.8–2.4)
MCH RBC QN AUTO: 31.6 PG (ref 25.7–33.7)
MCHC RBC AUTO-ENTMCNC: 33.4 G/DL (ref 32–35.9)
MCV RBC: 94.7 FL (ref 80–96)
METHADONE UR-MCNC: NEGATIVE UG/L
OPIATES UR QL SCN: NEGATIVE
PCO2 BLDA: 40.1 MMHG (ref 35–45)
PCP UR QL SCN: NEGATIVE
PHOSPHATE SERPL-MCNC: 3.9 MG/DL (ref 2.5–4.9)
PLATELET # BLD AUTO: 132 10^3/UL (ref 134–434)
PMV BLD: 10.5 FL (ref 7.5–11.1)
PO2 BLDA: 79 MMHG (ref 80–100)
RBC # BLD AUTO: 3.21 M/MM3 (ref 4–5.6)
SAO2 % BLDA: 94.6 % (ref 95–98)
SODIUM SERPL-SCNC: 138 MMOL/L (ref 136–145)
VENTILATION MODE VENT: (no result)
WBC # BLD AUTO: 8.6 K/MM3 (ref 4–10)

## 2022-08-21 RX ADMIN — INSULIN ASPART SCH UNITS: 100 INJECTION, SOLUTION INTRAVENOUS; SUBCUTANEOUS at 11:40

## 2022-08-21 RX ADMIN — SODIUM ZIRCONIUM CYCLOSILICATE SCH GM: 5 POWDER, FOR SUSPENSION ORAL at 09:23

## 2022-08-21 RX ADMIN — HEPARIN SODIUM SCH UNIT: 5000 INJECTION, SOLUTION INTRAVENOUS; SUBCUTANEOUS at 21:58

## 2022-08-21 RX ADMIN — HEPARIN SODIUM SCH UNIT: 5000 INJECTION, SOLUTION INTRAVENOUS; SUBCUTANEOUS at 13:42

## 2022-08-21 RX ADMIN — LATANOPROST SCH DROP: 50 SOLUTION OPHTHALMIC at 21:51

## 2022-08-21 RX ADMIN — FENTANYL CITRATE SCH MLS/HR: 0.05 INJECTION, SOLUTION INTRAMUSCULAR; INTRAVENOUS at 16:45

## 2022-08-21 RX ADMIN — PIPERACILLIN SODIUM AND TAZOBACTAM SODIUM SCH MLS/HR: .25; 2 INJECTION, POWDER, LYOPHILIZED, FOR SOLUTION INTRAVENOUS at 02:24

## 2022-08-21 RX ADMIN — INSULIN ASPART SCH UNITS: 100 INJECTION, SOLUTION INTRAVENOUS; SUBCUTANEOUS at 16:44

## 2022-08-21 RX ADMIN — PIPERACILLIN SODIUM AND TAZOBACTAM SODIUM SCH MLS/HR: .25; 2 INJECTION, POWDER, LYOPHILIZED, FOR SOLUTION INTRAVENOUS at 09:16

## 2022-08-21 RX ADMIN — SODIUM CHLORIDE SCH MLS/HR: 9 INJECTION, SOLUTION INTRAVENOUS at 21:50

## 2022-08-21 RX ADMIN — SODIUM CHLORIDE, POTASSIUM CHLORIDE, SODIUM LACTATE AND CALCIUM CHLORIDE SCH MLS/HR: 600; 310; 30; 20 INJECTION, SOLUTION INTRAVENOUS at 19:30

## 2022-08-21 RX ADMIN — INSULIN DETEMIR SCH UNITS: 100 INJECTION, SOLUTION SUBCUTANEOUS at 21:52

## 2022-08-21 RX ADMIN — PANTOPRAZOLE SODIUM SCH MG: 40 INJECTION, POWDER, FOR SOLUTION INTRAVENOUS at 09:21

## 2022-08-21 RX ADMIN — INSULIN ASPART SCH: 100 INJECTION, SOLUTION INTRAVENOUS; SUBCUTANEOUS at 21:51

## 2022-08-22 LAB
ANION GAP SERPL CALC-SCNC: 12 MMOL/L (ref 8–16)
ARTERIAL PATENCY WRIST A: POSITIVE
BASE EXCESS BLDA CALC-SCNC: -2.7 MMOL/L (ref -2–2)
BREATHS.MECHANICAL ON VENT: 22
BUN SERPL-MCNC: 54.3 MG/DL (ref 7–18)
CALCIUM SERPL-MCNC: 7.5 MG/DL (ref 8.5–10.1)
CHLORIDE SERPL-SCNC: 107 MMOL/L (ref 98–107)
CO2 SERPL-SCNC: 21 MMOL/L (ref 21–32)
CREAT SERPL-MCNC: 5 MG/DL (ref 0.55–1.3)
DEPRECATED RDW RBC AUTO: 14.8 % (ref 11.9–15.9)
GLUCOSE SERPL-MCNC: 189 MG/DL (ref 74–106)
HCT VFR BLD CALC: 31.8 % (ref 35.4–49)
HCT VFR BLDV CALC: 25 % (ref 35.4–49)
HGB BLD-MCNC: 10.3 GM/DL (ref 11.7–16.9)
LACTATE SERPL-MCNC: 2.2 MMOL/L (ref 0.4–2)
LACTATE SERPL-MCNC: 3.3 MMOL/L (ref 0.4–2)
MAGNESIUM SERPL-MCNC: 2.1 MG/DL (ref 1.8–2.4)
MCH RBC QN AUTO: 31.4 PG (ref 25.7–33.7)
MCHC RBC AUTO-ENTMCNC: 32.5 G/DL (ref 32–35.9)
MCV RBC: 96.5 FL (ref 80–96)
PCO2 BLDA: 36.4 MMHG (ref 35–45)
PHOSPHATE SERPL-MCNC: 4 MG/DL (ref 2.5–4.9)
PLATELET # BLD AUTO: 131 10^3/UL (ref 134–434)
PMV BLD: 10.8 FL (ref 7.5–11.1)
PO2 BLDA: 113 MMHG (ref 80–100)
RBC # BLD AUTO: 3.29 M/MM3 (ref 4–5.6)
SAO2 % BLDA: 98.1 % (ref 95–98)
SODIUM SERPL-SCNC: 139 MMOL/L (ref 136–145)
VENTILATION MODE VENT: (no result)
WBC # BLD AUTO: 8.5 K/MM3 (ref 4–10)

## 2022-08-22 RX ADMIN — INSULIN ASPART SCH UNITS: 100 INJECTION, SOLUTION INTRAVENOUS; SUBCUTANEOUS at 06:36

## 2022-08-22 RX ADMIN — PROPOFOL SCH MLS/HR: 10 INJECTION, EMULSION INTRAVENOUS at 00:30

## 2022-08-22 RX ADMIN — INSULIN ASPART SCH UNITS: 100 INJECTION, SOLUTION INTRAVENOUS; SUBCUTANEOUS at 18:14

## 2022-08-22 RX ADMIN — PANTOPRAZOLE SODIUM SCH MG: 40 INJECTION, POWDER, FOR SOLUTION INTRAVENOUS at 10:41

## 2022-08-22 RX ADMIN — SODIUM CHLORIDE SCH MLS/HR: 9 INJECTION, SOLUTION INTRAVENOUS at 18:47

## 2022-08-22 RX ADMIN — INSULIN DETEMIR SCH UNITS: 100 INJECTION, SOLUTION SUBCUTANEOUS at 22:40

## 2022-08-22 RX ADMIN — HEPARIN SODIUM SCH UNIT: 5000 INJECTION, SOLUTION INTRAVENOUS; SUBCUTANEOUS at 14:16

## 2022-08-22 RX ADMIN — INSULIN ASPART SCH: 100 INJECTION, SOLUTION INTRAVENOUS; SUBCUTANEOUS at 11:16

## 2022-08-22 RX ADMIN — SODIUM CHLORIDE, POTASSIUM CHLORIDE, SODIUM LACTATE AND CALCIUM CHLORIDE SCH MLS/HR: 600; 310; 30; 20 INJECTION, SOLUTION INTRAVENOUS at 22:27

## 2022-08-22 RX ADMIN — PIPERACILLIN SODIUM AND TAZOBACTAM SODIUM SCH MLS/HR: .25; 2 INJECTION, POWDER, LYOPHILIZED, FOR SOLUTION INTRAVENOUS at 17:53

## 2022-08-22 RX ADMIN — HEPARIN SODIUM SCH UNIT: 5000 INJECTION, SOLUTION INTRAVENOUS; SUBCUTANEOUS at 06:36

## 2022-08-22 RX ADMIN — FENTANYL CITRATE SCH MLS/HR: 0.05 INJECTION, SOLUTION INTRAMUSCULAR; INTRAVENOUS at 15:14

## 2022-08-22 RX ADMIN — SODIUM ZIRCONIUM CYCLOSILICATE SCH GM: 5 POWDER, FOR SUSPENSION ORAL at 10:41

## 2022-08-22 RX ADMIN — LATANOPROST SCH DROP: 50 SOLUTION OPHTHALMIC at 22:28

## 2022-08-22 RX ADMIN — HEPARIN SODIUM SCH UNIT: 5000 INJECTION, SOLUTION INTRAVENOUS; SUBCUTANEOUS at 22:27

## 2022-08-22 RX ADMIN — INSULIN ASPART SCH UNITS: 100 INJECTION, SOLUTION INTRAVENOUS; SUBCUTANEOUS at 22:30

## 2022-08-22 RX ADMIN — FENTANYL CITRATE SCH MLS/HR: 0.05 INJECTION, SOLUTION INTRAMUSCULAR; INTRAVENOUS at 04:00

## 2022-08-23 LAB
ALBUMIN SERPL-MCNC: 2.3 G/DL (ref 3.4–5)
ALP SERPL-CCNC: 71 U/L (ref 45–117)
ALT SERPL-CCNC: 16 U/L (ref 13–61)
ANION GAP SERPL CALC-SCNC: 11 MMOL/L (ref 8–16)
AST SERPL-CCNC: 39 U/L (ref 15–37)
BILIRUB SERPL-MCNC: 0.5 MG/DL (ref 0.2–1)
BUN SERPL-MCNC: 62.7 MG/DL (ref 7–18)
CALCIUM SERPL-MCNC: 7.7 MG/DL (ref 8.5–10.1)
CHLORIDE SERPL-SCNC: 107 MMOL/L (ref 98–107)
CO2 SERPL-SCNC: 22 MMOL/L (ref 21–32)
CREAT SERPL-MCNC: 6.1 MG/DL (ref 0.55–1.3)
DEPRECATED RDW RBC AUTO: 14.4 % (ref 11.9–15.9)
GLUCOSE SERPL-MCNC: 99 MG/DL (ref 74–106)
HCT VFR BLD CALC: 32.2 % (ref 35.4–49)
HGB BLD-MCNC: 10.5 GM/DL (ref 11.7–16.9)
MAGNESIUM SERPL-MCNC: 2.1 MG/DL (ref 1.8–2.4)
MCH RBC QN AUTO: 31.5 PG (ref 25.7–33.7)
MCHC RBC AUTO-ENTMCNC: 32.7 G/DL (ref 32–35.9)
MCV RBC: 96.3 FL (ref 80–96)
PHOSPHATE SERPL-MCNC: 6 MG/DL (ref 2.5–4.9)
PLATELET # BLD AUTO: 152 10^3/UL (ref 134–434)
PMV BLD: 10.6 FL (ref 7.5–11.1)
PROT SERPL-MCNC: 5.1 G/DL (ref 6.4–8.2)
RBC # BLD AUTO: 3.34 M/MM3 (ref 4–5.6)
SODIUM SERPL-SCNC: 140 MMOL/L (ref 136–145)
WBC # BLD AUTO: 8.9 K/MM3 (ref 4–10)

## 2022-08-23 PROCEDURE — 02H633Z INSERTION OF INFUSION DEVICE INTO RIGHT ATRIUM, PERCUTANEOUS APPROACH: ICD-10-PCS | Performed by: INTERNAL MEDICINE

## 2022-08-23 PROCEDURE — B548ZZA ULTRASONOGRAPHY OF SUPERIOR VENA CAVA, GUIDANCE: ICD-10-PCS | Performed by: INTERNAL MEDICINE

## 2022-08-23 RX ADMIN — HEPARIN SODIUM SCH UNIT: 5000 INJECTION, SOLUTION INTRAVENOUS; SUBCUTANEOUS at 13:19

## 2022-08-23 RX ADMIN — SODIUM ZIRCONIUM CYCLOSILICATE SCH GM: 5 POWDER, FOR SUSPENSION ORAL at 10:48

## 2022-08-23 RX ADMIN — FENTANYL CITRATE SCH: 0.05 INJECTION, SOLUTION INTRAMUSCULAR; INTRAVENOUS at 23:31

## 2022-08-23 RX ADMIN — PIPERACILLIN SODIUM AND TAZOBACTAM SODIUM SCH MLS/HR: .25; 2 INJECTION, POWDER, LYOPHILIZED, FOR SOLUTION INTRAVENOUS at 10:48

## 2022-08-23 RX ADMIN — LATANOPROST SCH DROP: 50 SOLUTION OPHTHALMIC at 21:14

## 2022-08-23 RX ADMIN — HEPARIN SODIUM SCH UNIT: 5000 INJECTION, SOLUTION INTRAVENOUS; SUBCUTANEOUS at 21:10

## 2022-08-23 RX ADMIN — NOREPINEPHRINE BITARTRATE SCH: 1 INJECTION, SOLUTION, CONCENTRATE INTRAVENOUS at 23:31

## 2022-08-23 RX ADMIN — Medication SCH MLS/HR: at 07:18

## 2022-08-23 RX ADMIN — INSULIN ASPART SCH: 100 INJECTION, SOLUTION INTRAVENOUS; SUBCUTANEOUS at 21:11

## 2022-08-23 RX ADMIN — INSULIN ASPART SCH: 100 INJECTION, SOLUTION INTRAVENOUS; SUBCUTANEOUS at 16:48

## 2022-08-23 RX ADMIN — INSULIN ASPART SCH: 100 INJECTION, SOLUTION INTRAVENOUS; SUBCUTANEOUS at 07:19

## 2022-08-23 RX ADMIN — INSULIN ASPART SCH: 100 INJECTION, SOLUTION INTRAVENOUS; SUBCUTANEOUS at 12:19

## 2022-08-23 RX ADMIN — PIPERACILLIN SODIUM AND TAZOBACTAM SODIUM SCH MLS/HR: .25; 2 INJECTION, POWDER, LYOPHILIZED, FOR SOLUTION INTRAVENOUS at 02:18

## 2022-08-23 RX ADMIN — SODIUM CHLORIDE, SODIUM LACTATE, POTASSIUM CHLORIDE, CALCIUM CHLORIDE AND DEXTROSE MONOHYDRATE SCH MLS/HR: 5; 600; 310; 30; 20 INJECTION, SOLUTION INTRAVENOUS at 13:15

## 2022-08-23 RX ADMIN — HEPARIN SODIUM SCH UNIT: 5000 INJECTION, SOLUTION INTRAVENOUS; SUBCUTANEOUS at 07:18

## 2022-08-23 RX ADMIN — PIPERACILLIN SODIUM AND TAZOBACTAM SODIUM SCH MLS/HR: .25; 2 INJECTION, POWDER, LYOPHILIZED, FOR SOLUTION INTRAVENOUS at 18:39

## 2022-08-23 RX ADMIN — PROPOFOL SCH: 10 INJECTION, EMULSION INTRAVENOUS at 10:47

## 2022-08-23 RX ADMIN — INSULIN DETEMIR SCH UNITS: 100 INJECTION, SOLUTION SUBCUTANEOUS at 21:11

## 2022-08-23 RX ADMIN — PANTOPRAZOLE SODIUM SCH MG: 40 INJECTION, POWDER, FOR SOLUTION INTRAVENOUS at 10:48

## 2022-08-24 LAB
ALBUMIN SERPL-MCNC: 2.1 G/DL (ref 3.4–5)
ALP SERPL-CCNC: 76 U/L (ref 45–117)
ALT SERPL-CCNC: 26 U/L (ref 13–61)
ANION GAP SERPL CALC-SCNC: 10 MMOL/L (ref 8–16)
AST SERPL-CCNC: 71 U/L (ref 15–37)
BILIRUB SERPL-MCNC: 0.5 MG/DL (ref 0.2–1)
BUN SERPL-MCNC: 71.5 MG/DL (ref 7–18)
CALCIUM SERPL-MCNC: 7.4 MG/DL (ref 8.5–10.1)
CHLORIDE SERPL-SCNC: 109 MMOL/L (ref 98–107)
CO2 SERPL-SCNC: 21 MMOL/L (ref 21–32)
CREAT SERPL-MCNC: 7 MG/DL (ref 0.55–1.3)
DEPRECATED RDW RBC AUTO: 14.9 % (ref 11.9–15.9)
GLUCOSE SERPL-MCNC: 155 MG/DL (ref 74–106)
HCT VFR BLD CALC: 31.5 % (ref 35.4–49)
HGB BLD-MCNC: 10.1 GM/DL (ref 11.7–16.9)
MAGNESIUM SERPL-MCNC: 2.3 MG/DL (ref 1.8–2.4)
MCH RBC QN AUTO: 31.3 PG (ref 25.7–33.7)
MCHC RBC AUTO-ENTMCNC: 32.2 G/DL (ref 32–35.9)
MCV RBC: 97.3 FL (ref 80–96)
PHOSPHATE SERPL-MCNC: 7.9 MG/DL (ref 2.5–4.9)
PLATELET # BLD AUTO: 154 10^3/UL (ref 134–434)
PMV BLD: 10.3 FL (ref 7.5–11.1)
PROT SERPL-MCNC: 5.1 G/DL (ref 6.4–8.2)
RBC # BLD AUTO: 3.24 M/MM3 (ref 4–5.6)
SODIUM SERPL-SCNC: 140 MMOL/L (ref 136–145)
WBC # BLD AUTO: 9.6 K/MM3 (ref 4–10)

## 2022-08-24 RX ADMIN — SODIUM CHLORIDE, SODIUM LACTATE, POTASSIUM CHLORIDE, CALCIUM CHLORIDE AND DEXTROSE MONOHYDRATE SCH MLS/HR: 5; 600; 310; 30; 20 INJECTION, SOLUTION INTRAVENOUS at 16:44

## 2022-08-24 RX ADMIN — PIPERACILLIN SODIUM AND TAZOBACTAM SODIUM SCH MLS/HR: .25; 2 INJECTION, POWDER, LYOPHILIZED, FOR SOLUTION INTRAVENOUS at 17:03

## 2022-08-24 RX ADMIN — INSULIN ASPART SCH UNITS: 100 INJECTION, SOLUTION INTRAVENOUS; SUBCUTANEOUS at 16:53

## 2022-08-24 RX ADMIN — HEPARIN SODIUM SCH UNIT: 5000 INJECTION, SOLUTION INTRAVENOUS; SUBCUTANEOUS at 14:28

## 2022-08-24 RX ADMIN — NOREPINEPHRINE BITARTRATE SCH: 1 INJECTION, SOLUTION, CONCENTRATE INTRAVENOUS at 11:45

## 2022-08-24 RX ADMIN — PANTOPRAZOLE SODIUM SCH MG: 40 INJECTION, POWDER, FOR SOLUTION INTRAVENOUS at 10:08

## 2022-08-24 RX ADMIN — HEPARIN SODIUM SCH UNIT: 5000 INJECTION, SOLUTION INTRAVENOUS; SUBCUTANEOUS at 21:12

## 2022-08-24 RX ADMIN — INSULIN ASPART SCH UNITS: 100 INJECTION, SOLUTION INTRAVENOUS; SUBCUTANEOUS at 21:11

## 2022-08-24 RX ADMIN — ACETAMINOPHEN PRN MG: 10 INJECTION, SOLUTION INTRAVENOUS at 15:24

## 2022-08-24 RX ADMIN — SODIUM ZIRCONIUM CYCLOSILICATE SCH GM: 5 POWDER, FOR SUSPENSION ORAL at 21:12

## 2022-08-24 RX ADMIN — SODIUM ZIRCONIUM CYCLOSILICATE SCH GM: 5 POWDER, FOR SUSPENSION ORAL at 10:07

## 2022-08-24 RX ADMIN — PIPERACILLIN SODIUM AND TAZOBACTAM SODIUM SCH MLS/HR: .25; 2 INJECTION, POWDER, LYOPHILIZED, FOR SOLUTION INTRAVENOUS at 01:26

## 2022-08-24 RX ADMIN — SODIUM CHLORIDE, SODIUM LACTATE, POTASSIUM CHLORIDE, CALCIUM CHLORIDE AND DEXTROSE MONOHYDRATE SCH: 5; 600; 310; 30; 20 INJECTION, SOLUTION INTRAVENOUS at 12:45

## 2022-08-24 RX ADMIN — SODIUM CHLORIDE, SODIUM LACTATE, POTASSIUM CHLORIDE, CALCIUM CHLORIDE AND DEXTROSE MONOHYDRATE SCH MLS/HR: 5; 600; 310; 30; 20 INJECTION, SOLUTION INTRAVENOUS at 03:00

## 2022-08-24 RX ADMIN — PIPERACILLIN SODIUM AND TAZOBACTAM SODIUM SCH MLS/HR: .25; 2 INJECTION, POWDER, LYOPHILIZED, FOR SOLUTION INTRAVENOUS at 10:08

## 2022-08-24 RX ADMIN — Medication SCH: at 01:25

## 2022-08-24 RX ADMIN — FENTANYL CITRATE SCH: 0.05 INJECTION, SOLUTION INTRAMUSCULAR; INTRAVENOUS at 16:30

## 2022-08-24 RX ADMIN — INSULIN ASPART SCH: 100 INJECTION, SOLUTION INTRAVENOUS; SUBCUTANEOUS at 06:10

## 2022-08-24 RX ADMIN — NOREPINEPHRINE BITARTRATE SCH MLS/HR: 1 INJECTION, SOLUTION, CONCENTRATE INTRAVENOUS at 10:11

## 2022-08-24 RX ADMIN — HEPARIN SODIUM SCH UNIT: 5000 INJECTION, SOLUTION INTRAVENOUS; SUBCUTANEOUS at 06:10

## 2022-08-24 RX ADMIN — INSULIN ASPART SCH UNITS: 100 INJECTION, SOLUTION INTRAVENOUS; SUBCUTANEOUS at 10:32

## 2022-08-24 RX ADMIN — LATANOPROST SCH DROP: 50 SOLUTION OPHTHALMIC at 21:12

## 2022-08-24 RX ADMIN — INSULIN DETEMIR SCH UNITS: 100 INJECTION, SOLUTION SUBCUTANEOUS at 21:12

## 2022-08-25 LAB
ALBUMIN SERPL-MCNC: 2 G/DL (ref 3.4–5)
ALP SERPL-CCNC: 87 U/L (ref 45–117)
ALT SERPL-CCNC: 29 U/L (ref 13–61)
ANION GAP SERPL CALC-SCNC: 10 MMOL/L (ref 8–16)
ANION GAP SERPL CALC-SCNC: 13 MMOL/L (ref 8–16)
ARTERIAL PATENCY WRIST A: POSITIVE
AST SERPL-CCNC: 63 U/L (ref 15–37)
BASE EXCESS BLDA CALC-SCNC: -11.4 MMOL/L (ref -2–2)
BILIRUB SERPL-MCNC: 0.4 MG/DL (ref 0.2–1)
BREATHS.MECHANICAL ON VENT: 20
BUN SERPL-MCNC: 77.1 MG/DL (ref 7–18)
BUN SERPL-MCNC: 80.9 MG/DL (ref 7–18)
CALCIUM SERPL-MCNC: 7.4 MG/DL (ref 8.5–10.1)
CALCIUM SERPL-MCNC: 7.6 MG/DL (ref 8.5–10.1)
CHLORIDE SERPL-SCNC: 104 MMOL/L (ref 98–107)
CHLORIDE SERPL-SCNC: 106 MMOL/L (ref 98–107)
CO2 SERPL-SCNC: 19 MMOL/L (ref 21–32)
CO2 SERPL-SCNC: 21 MMOL/L (ref 21–32)
CREAT SERPL-MCNC: 7.6 MG/DL (ref 0.55–1.3)
CREAT SERPL-MCNC: 7.8 MG/DL (ref 0.55–1.3)
DEPRECATED RDW RBC AUTO: 14.6 % (ref 11.9–15.9)
GLUCOSE SERPL-MCNC: 259 MG/DL (ref 74–106)
GLUCOSE SERPL-MCNC: 272 MG/DL (ref 74–106)
HCT VFR BLD CALC: 29.9 % (ref 35.4–49)
HCT VFR BLDV CALC: 31 % (ref 35.4–49)
HGB BLD-MCNC: 9.7 GM/DL (ref 11.7–16.9)
MAGNESIUM SERPL-MCNC: 2.2 MG/DL (ref 1.8–2.4)
MCH RBC QN AUTO: 31.3 PG (ref 25.7–33.7)
MCHC RBC AUTO-ENTMCNC: 32.5 G/DL (ref 32–35.9)
MCV RBC: 96.5 FL (ref 80–96)
PCO2 BLDA: 46.9 MMHG (ref 35–45)
PHOSPHATE SERPL-MCNC: 7.6 MG/DL (ref 2.5–4.9)
PLATELET # BLD AUTO: 158 10^3/UL (ref 134–434)
PMV BLD: 9.6 FL (ref 7.5–11.1)
PO2 BLDA: 96 MMHG (ref 80–100)
PROT SERPL-MCNC: 5 G/DL (ref 6.4–8.2)
RBC # BLD AUTO: 3.1 M/MM3 (ref 4–5.6)
SAO2 % BLDA: 95.4 % (ref 95–98)
SODIUM SERPL-SCNC: 137 MMOL/L (ref 136–145)
SODIUM SERPL-SCNC: 137 MMOL/L (ref 136–145)
VENTILATION MODE VENT: (no result)
WBC # BLD AUTO: 11.1 K/MM3 (ref 4–10)

## 2022-08-25 RX ADMIN — PANTOPRAZOLE SODIUM SCH MG: 40 INJECTION, POWDER, FOR SOLUTION INTRAVENOUS at 09:39

## 2022-08-25 RX ADMIN — SODIUM ZIRCONIUM CYCLOSILICATE SCH: 5 POWDER, FOR SUSPENSION ORAL at 16:39

## 2022-08-25 RX ADMIN — PIPERACILLIN SODIUM AND TAZOBACTAM SODIUM SCH MLS/HR: .25; 2 INJECTION, POWDER, LYOPHILIZED, FOR SOLUTION INTRAVENOUS at 02:47

## 2022-08-25 RX ADMIN — INSULIN ASPART SCH UNITS: 100 INJECTION, SOLUTION INTRAVENOUS; SUBCUTANEOUS at 12:25

## 2022-08-25 RX ADMIN — PIPERACILLIN SODIUM AND TAZOBACTAM SODIUM SCH MLS/HR: .25; 2 INJECTION, POWDER, LYOPHILIZED, FOR SOLUTION INTRAVENOUS at 18:09

## 2022-08-25 RX ADMIN — HEPARIN SODIUM SCH UNIT: 5000 INJECTION, SOLUTION INTRAVENOUS; SUBCUTANEOUS at 07:12

## 2022-08-25 RX ADMIN — Medication SCH MLS/HR: at 19:48

## 2022-08-25 RX ADMIN — INSULIN ASPART SCH UNITS: 100 INJECTION, SOLUTION INTRAVENOUS; SUBCUTANEOUS at 21:21

## 2022-08-25 RX ADMIN — HEPARIN SODIUM SCH UNIT: 5000 INJECTION, SOLUTION INTRAVENOUS; SUBCUTANEOUS at 13:39

## 2022-08-25 RX ADMIN — HEPARIN SODIUM SCH UNIT: 5000 INJECTION, SOLUTION INTRAVENOUS; SUBCUTANEOUS at 21:15

## 2022-08-25 RX ADMIN — Medication SCH: at 02:49

## 2022-08-25 RX ADMIN — PIPERACILLIN SODIUM AND TAZOBACTAM SODIUM SCH MLS/HR: .25; 2 INJECTION, POWDER, LYOPHILIZED, FOR SOLUTION INTRAVENOUS at 09:39

## 2022-08-25 RX ADMIN — FENTANYL CITRATE SCH: 0.05 INJECTION, SOLUTION INTRAMUSCULAR; INTRAVENOUS at 16:43

## 2022-08-25 RX ADMIN — NOREPINEPHRINE BITARTRATE SCH: 1 INJECTION, SOLUTION, CONCENTRATE INTRAVENOUS at 16:42

## 2022-08-25 RX ADMIN — SODIUM CHLORIDE SCH MLS/HR: 4.5 INJECTION, SOLUTION INTRAVENOUS at 21:10

## 2022-08-25 RX ADMIN — INSULIN ASPART SCH UNITS: 100 INJECTION, SOLUTION INTRAVENOUS; SUBCUTANEOUS at 18:08

## 2022-08-25 RX ADMIN — INSULIN ASPART SCH UNITS: 100 INJECTION, SOLUTION INTRAVENOUS; SUBCUTANEOUS at 06:33

## 2022-08-25 RX ADMIN — LATANOPROST SCH DROP: 50 SOLUTION OPHTHALMIC at 21:10

## 2022-08-25 RX ADMIN — INSULIN DETEMIR SCH UNITS: 100 INJECTION, SOLUTION SUBCUTANEOUS at 21:22

## 2022-08-25 RX ADMIN — ACETAMINOPHEN PRN MG: 10 INJECTION, SOLUTION INTRAVENOUS at 06:48

## 2022-08-26 LAB
ALBUMIN SERPL-MCNC: 1.9 G/DL (ref 3.4–5)
ALP SERPL-CCNC: 88 U/L (ref 45–117)
ALT SERPL-CCNC: 32 U/L (ref 13–61)
ANION GAP SERPL CALC-SCNC: 10 MMOL/L (ref 8–16)
ANION GAP SERPL CALC-SCNC: 10 MMOL/L (ref 8–16)
ARTERIAL PATENCY WRIST A: POSITIVE
AST SERPL-CCNC: 51 U/L (ref 15–37)
BASE EXCESS BLDA CALC-SCNC: -9.9 MMOL/L (ref -2–2)
BASOPHILS # BLD: 0.3 % (ref 0–2)
BILIRUB SERPL-MCNC: 0.4 MG/DL (ref 0.2–1)
BREATHS.MECHANICAL ON VENT: 20
BUN SERPL-MCNC: 73.5 MG/DL (ref 7–18)
BUN SERPL-MCNC: 90.6 MG/DL (ref 7–18)
CALCIUM SERPL-MCNC: 7.4 MG/DL (ref 8.5–10.1)
CALCIUM SERPL-MCNC: 7.4 MG/DL (ref 8.5–10.1)
CHLORIDE SERPL-SCNC: 104 MMOL/L (ref 98–107)
CHLORIDE SERPL-SCNC: 108 MMOL/L (ref 98–107)
CO2 SERPL-SCNC: 20 MMOL/L (ref 21–32)
CO2 SERPL-SCNC: 24 MMOL/L (ref 21–32)
CREAT SERPL-MCNC: 6.7 MG/DL (ref 0.55–1.3)
CREAT SERPL-MCNC: 8 MG/DL (ref 0.55–1.3)
DEPRECATED RDW RBC AUTO: 14.9 % (ref 11.9–15.9)
EOSINOPHIL # BLD: 1.9 % (ref 0–4.5)
GLUCOSE SERPL-MCNC: 137 MG/DL (ref 74–106)
GLUCOSE SERPL-MCNC: 90 MG/DL (ref 74–106)
HCT VFR BLD CALC: 32 % (ref 35.4–49)
HCT VFR BLDV CALC: 34 % (ref 35.4–49)
HGB BLD-MCNC: 10.1 GM/DL (ref 11.7–16.9)
LYMPHOCYTES # BLD: 4.2 % (ref 8–40)
MAGNESIUM SERPL-MCNC: 2.2 MG/DL (ref 1.8–2.4)
MCH RBC QN AUTO: 30.7 PG (ref 25.7–33.7)
MCHC RBC AUTO-ENTMCNC: 31.6 G/DL (ref 32–35.9)
MCV RBC: 97.2 FL (ref 80–96)
MONOCYTES # BLD AUTO: 6.2 % (ref 3.8–10.2)
NEUTROPHILS # BLD: 87.4 % (ref 42.8–82.8)
PCO2 BLDA: 40.6 MMHG (ref 35–45)
PHOSPHATE SERPL-MCNC: 8.8 MG/DL (ref 2.5–4.9)
PLATELET # BLD AUTO: 199 10^3/UL (ref 134–434)
PMV BLD: 9.4 FL (ref 7.5–11.1)
PO2 BLDA: 69.6 MMHG (ref 80–100)
PROT SERPL-MCNC: 5.4 G/DL (ref 6.4–8.2)
RBC # BLD AUTO: 3.29 M/MM3 (ref 4–5.6)
SAO2 % BLDA: 91 % (ref 95–98)
SODIUM SERPL-SCNC: 137 MMOL/L (ref 136–145)
SODIUM SERPL-SCNC: 138 MMOL/L (ref 136–145)
VENTILATION MODE VENT: (no result)
WBC # BLD AUTO: 15.2 K/MM3 (ref 4–10)

## 2022-08-26 PROCEDURE — 5A1D70Z PERFORMANCE OF URINARY FILTRATION, INTERMITTENT, LESS THAN 6 HOURS PER DAY: ICD-10-PCS | Performed by: INTERNAL MEDICINE

## 2022-08-26 PROCEDURE — B544ZZA ULTRASONOGRAPHY OF LEFT JUGULAR VEINS, GUIDANCE: ICD-10-PCS | Performed by: INTERNAL MEDICINE

## 2022-08-26 PROCEDURE — 05HN33Z INSERTION OF INFUSION DEVICE INTO LEFT INTERNAL JUGULAR VEIN, PERCUTANEOUS APPROACH: ICD-10-PCS | Performed by: INTERNAL MEDICINE

## 2022-08-26 RX ADMIN — PIPERACILLIN SODIUM AND TAZOBACTAM SODIUM SCH MLS/HR: .25; 2 INJECTION, POWDER, LYOPHILIZED, FOR SOLUTION INTRAVENOUS at 01:01

## 2022-08-26 RX ADMIN — FENTANYL CITRATE SCH MLS/HR: 0.05 INJECTION, SOLUTION INTRAMUSCULAR; INTRAVENOUS at 17:45

## 2022-08-26 RX ADMIN — HEPARIN SODIUM SCH UNIT: 5000 INJECTION, SOLUTION INTRAVENOUS; SUBCUTANEOUS at 13:20

## 2022-08-26 RX ADMIN — NOREPINEPHRINE BITARTRATE SCH MLS/HR: 1 INJECTION, SOLUTION, CONCENTRATE INTRAVENOUS at 18:43

## 2022-08-26 RX ADMIN — PANTOPRAZOLE SODIUM SCH MG: 40 INJECTION, POWDER, FOR SOLUTION INTRAVENOUS at 09:55

## 2022-08-26 RX ADMIN — SODIUM CHLORIDE SCH MLS/HR: 4.5 INJECTION, SOLUTION INTRAVENOUS at 21:32

## 2022-08-26 RX ADMIN — PIPERACILLIN SODIUM AND TAZOBACTAM SODIUM SCH MLS/HR: .25; 2 INJECTION, POWDER, LYOPHILIZED, FOR SOLUTION INTRAVENOUS at 17:46

## 2022-08-26 RX ADMIN — PIPERACILLIN SODIUM AND TAZOBACTAM SODIUM SCH MLS/HR: .25; 2 INJECTION, POWDER, LYOPHILIZED, FOR SOLUTION INTRAVENOUS at 09:55

## 2022-08-26 RX ADMIN — INSULIN ASPART SCH UNITS: 100 INJECTION, SOLUTION INTRAVENOUS; SUBCUTANEOUS at 21:40

## 2022-08-26 RX ADMIN — LATANOPROST SCH DROP: 50 SOLUTION OPHTHALMIC at 21:32

## 2022-08-26 RX ADMIN — FENTANYL CITRATE SCH MLS/HR: 0.05 INJECTION, SOLUTION INTRAMUSCULAR; INTRAVENOUS at 13:20

## 2022-08-26 RX ADMIN — HEPARIN SODIUM SCH UNIT: 5000 INJECTION, SOLUTION INTRAVENOUS; SUBCUTANEOUS at 06:32

## 2022-08-26 RX ADMIN — HEPARIN SODIUM SCH UNIT: 5000 INJECTION, SOLUTION INTRAVENOUS; SUBCUTANEOUS at 21:32

## 2022-08-26 RX ADMIN — INSULIN ASPART SCH UNITS: 100 INJECTION, SOLUTION INTRAVENOUS; SUBCUTANEOUS at 17:45

## 2022-08-26 RX ADMIN — FENTANYL CITRATE SCH MLS/HR: 0.05 INJECTION, SOLUTION INTRAMUSCULAR; INTRAVENOUS at 07:00

## 2022-08-26 RX ADMIN — Medication SCH MLS/HR: at 13:21

## 2022-08-26 RX ADMIN — SODIUM CHLORIDE SCH MLS/HR: 4.5 INJECTION, SOLUTION INTRAVENOUS at 17:50

## 2022-08-26 RX ADMIN — INSULIN ASPART SCH: 100 INJECTION, SOLUTION INTRAVENOUS; SUBCUTANEOUS at 13:14

## 2022-08-26 RX ADMIN — INSULIN DETEMIR SCH UNITS: 100 INJECTION, SOLUTION SUBCUTANEOUS at 21:47

## 2022-08-26 RX ADMIN — INSULIN ASPART SCH: 100 INJECTION, SOLUTION INTRAVENOUS; SUBCUTANEOUS at 06:27

## 2022-08-27 LAB
ALBUMIN SERPL-MCNC: 1.6 G/DL (ref 3.4–5)
ALP SERPL-CCNC: 85 U/L (ref 45–117)
ALT SERPL-CCNC: 26 U/L (ref 13–61)
ANION GAP SERPL CALC-SCNC: 10 MMOL/L (ref 8–16)
AST SERPL-CCNC: 44 U/L (ref 15–37)
BASOPHILS # BLD: 0.2 % (ref 0–2)
BILIRUB SERPL-MCNC: 0.4 MG/DL (ref 0.2–1)
BUN SERPL-MCNC: 74.7 MG/DL (ref 7–18)
CALCIUM SERPL-MCNC: 7 MG/DL (ref 8.5–10.1)
CHLORIDE SERPL-SCNC: 101 MMOL/L (ref 98–107)
CO2 SERPL-SCNC: 25 MMOL/L (ref 21–32)
CREAT SERPL-MCNC: 7 MG/DL (ref 0.55–1.3)
DEPRECATED RDW RBC AUTO: 14.8 % (ref 11.9–15.9)
EOSINOPHIL # BLD: 3.3 % (ref 0–4.5)
GLUCOSE SERPL-MCNC: 195 MG/DL (ref 74–106)
HCT VFR BLD CALC: 27.4 % (ref 35.4–49)
HGB BLD-MCNC: 9.1 GM/DL (ref 11.7–16.9)
LYMPHOCYTES # BLD: 4.6 % (ref 8–40)
MAGNESIUM SERPL-MCNC: 2 MG/DL (ref 1.8–2.4)
MCH RBC QN AUTO: 32.1 PG (ref 25.7–33.7)
MCHC RBC AUTO-ENTMCNC: 33.1 G/DL (ref 32–35.9)
MCV RBC: 96.8 FL (ref 80–96)
MONOCYTES # BLD AUTO: 8.7 % (ref 3.8–10.2)
NEUTROPHILS # BLD: 83.2 % (ref 42.8–82.8)
PHOSPHATE SERPL-MCNC: 8.7 MG/DL (ref 2.5–4.9)
PLATELET # BLD AUTO: 159 10^3/UL (ref 134–434)
PMV BLD: 8.7 FL (ref 7.5–11.1)
PROT SERPL-MCNC: 4.8 G/DL (ref 6.4–8.2)
RBC # BLD AUTO: 2.83 M/MM3 (ref 4–5.6)
SODIUM SERPL-SCNC: 135 MMOL/L (ref 136–145)
WBC # BLD AUTO: 12.3 K/MM3 (ref 4–10)

## 2022-08-27 RX ADMIN — HEPARIN SODIUM SCH UNIT: 5000 INJECTION, SOLUTION INTRAVENOUS; SUBCUTANEOUS at 14:12

## 2022-08-27 RX ADMIN — LATANOPROST SCH DROP: 50 SOLUTION OPHTHALMIC at 21:19

## 2022-08-27 RX ADMIN — NOREPINEPHRINE BITARTRATE SCH: 1 INJECTION, SOLUTION, CONCENTRATE INTRAVENOUS at 11:45

## 2022-08-27 RX ADMIN — HEPARIN SODIUM SCH UNIT: 5000 INJECTION, SOLUTION INTRAVENOUS; SUBCUTANEOUS at 05:55

## 2022-08-27 RX ADMIN — NOREPINEPHRINE BITARTRATE SCH MLS/HR: 1 INJECTION, SOLUTION, CONCENTRATE INTRAVENOUS at 16:51

## 2022-08-27 RX ADMIN — INSULIN DETEMIR SCH UNITS: 100 INJECTION, SOLUTION SUBCUTANEOUS at 21:44

## 2022-08-27 RX ADMIN — INSULIN ASPART SCH UNITS: 100 INJECTION, SOLUTION INTRAVENOUS; SUBCUTANEOUS at 16:49

## 2022-08-27 RX ADMIN — INSULIN ASPART SCH UNITS: 100 INJECTION, SOLUTION INTRAVENOUS; SUBCUTANEOUS at 11:45

## 2022-08-27 RX ADMIN — PANTOPRAZOLE SODIUM SCH MG: 40 INJECTION, POWDER, FOR SOLUTION INTRAVENOUS at 09:34

## 2022-08-27 RX ADMIN — INSULIN ASPART SCH UNITS: 100 INJECTION, SOLUTION INTRAVENOUS; SUBCUTANEOUS at 06:14

## 2022-08-27 RX ADMIN — HEPARIN SODIUM SCH UNIT: 5000 INJECTION, SOLUTION INTRAVENOUS; SUBCUTANEOUS at 21:19

## 2022-08-27 RX ADMIN — Medication SCH MLS/HR: at 05:53

## 2022-08-27 RX ADMIN — Medication SCH MLS/HR: at 21:19

## 2022-08-27 RX ADMIN — PIPERACILLIN SODIUM AND TAZOBACTAM SODIUM SCH MLS/HR: .25; 2 INJECTION, POWDER, LYOPHILIZED, FOR SOLUTION INTRAVENOUS at 18:50

## 2022-08-27 RX ADMIN — FENTANYL CITRATE SCH MLS/HR: 0.05 INJECTION, SOLUTION INTRAMUSCULAR; INTRAVENOUS at 22:27

## 2022-08-27 RX ADMIN — PIPERACILLIN SODIUM AND TAZOBACTAM SODIUM SCH MLS/HR: .25; 2 INJECTION, POWDER, LYOPHILIZED, FOR SOLUTION INTRAVENOUS at 01:56

## 2022-08-27 RX ADMIN — PIPERACILLIN SODIUM AND TAZOBACTAM SODIUM SCH MLS/HR: .25; 2 INJECTION, POWDER, LYOPHILIZED, FOR SOLUTION INTRAVENOUS at 09:34

## 2022-08-27 RX ADMIN — FENTANYL CITRATE SCH MLS/HR: 0.05 INJECTION, SOLUTION INTRAMUSCULAR; INTRAVENOUS at 11:00

## 2022-08-27 RX ADMIN — INSULIN ASPART SCH UNITS: 100 INJECTION, SOLUTION INTRAVENOUS; SUBCUTANEOUS at 21:44

## 2022-08-27 RX ADMIN — FENTANYL CITRATE SCH MLS/HR: 0.05 INJECTION, SOLUTION INTRAMUSCULAR; INTRAVENOUS at 16:47

## 2022-08-27 RX ADMIN — SODIUM CHLORIDE SCH: 4.5 INJECTION, SOLUTION INTRAVENOUS at 22:27

## 2022-08-27 RX ADMIN — SODIUM CHLORIDE SCH MLS/HR: 4.5 INJECTION, SOLUTION INTRAVENOUS at 16:55

## 2022-08-28 LAB
ANION GAP SERPL CALC-SCNC: 10 MMOL/L (ref 8–16)
APPEARANCE UR: (no result)
BACTERIA # UR AUTO: 92 /UL (ref 0–1359)
BILIRUB UR STRIP.AUTO-MCNC: NEGATIVE MG/DL
BUN SERPL-MCNC: 66.4 MG/DL (ref 7–18)
CALCIUM SERPL-MCNC: 7.3 MG/DL (ref 8.5–10.1)
CASTS URNS QL MICRO: 6 /UL (ref 0–3.1)
CHLORIDE SERPL-SCNC: 100 MMOL/L (ref 98–107)
CO2 SERPL-SCNC: 27 MMOL/L (ref 21–32)
COLOR UR: YELLOW
CREAT SERPL-MCNC: 5.8 MG/DL (ref 0.55–1.3)
CRYSTALS URNS QL MICRO: (no result) /HPF
DEPRECATED RDW RBC AUTO: 15.2 % (ref 11.9–15.9)
EPITH CASTS URNS QL MICRO: >36 /UL (ref 0–25.1)
GLUCOSE SERPL-MCNC: 211 MG/DL (ref 74–106)
HCT VFR BLD CALC: 29 % (ref 35.4–49)
HGB BLD-MCNC: 9.3 GM/DL (ref 11.7–16.9)
KETONES UR QL STRIP: (no result)
LEUKOCYTE ESTERASE UR QL STRIP.AUTO: (no result)
MAGNESIUM SERPL-MCNC: 2.2 MG/DL (ref 1.8–2.4)
MCH RBC QN AUTO: 31.4 PG (ref 25.7–33.7)
MCHC RBC AUTO-ENTMCNC: 32 G/DL (ref 32–35.9)
MCV RBC: 98.1 FL (ref 80–96)
NITRITE UR QL STRIP: NEGATIVE
PH UR: 5 [PH] (ref 5–8)
PHOSPHATE SERPL-MCNC: 8.2 MG/DL (ref 2.5–4.9)
PLATELET # BLD AUTO: 161 10^3/UL (ref 134–434)
PMV BLD: 8.5 FL (ref 7.5–11.1)
PROT UR QL STRIP: (no result)
PROT UR QL STRIP: NEGATIVE
RBC # BLD AUTO: 2.96 M/MM3 (ref 4–5.6)
RBC # BLD AUTO: 82 /UL (ref 0–23.9)
SODIUM SERPL-SCNC: 136 MMOL/L (ref 136–145)
SP GR UR: 1.02 (ref 1.01–1.03)
UROBILINOGEN UR STRIP-MCNC: 1 MG/DL (ref 0.2–1)
WBC # BLD AUTO: 18.5 K/MM3 (ref 4–10)
WBC # UR AUTO: 253 /UL (ref 0–25.8)

## 2022-08-28 PROCEDURE — 4A133J1 MONITORING OF ARTERIAL PULSE, PERIPHERAL, PERCUTANEOUS APPROACH: ICD-10-PCS | Performed by: INTERNAL MEDICINE

## 2022-08-28 PROCEDURE — 03HY32Z INSERTION OF MONITORING DEVICE INTO UPPER ARTERY, PERCUTANEOUS APPROACH: ICD-10-PCS | Performed by: INTERNAL MEDICINE

## 2022-08-28 PROCEDURE — 4A133B1 MONITORING OF ARTERIAL PRESSURE, PERIPHERAL, PERCUTANEOUS APPROACH: ICD-10-PCS | Performed by: INTERNAL MEDICINE

## 2022-08-28 RX ADMIN — LATANOPROST SCH DROP: 50 SOLUTION OPHTHALMIC at 22:30

## 2022-08-28 RX ADMIN — Medication SCH: at 01:18

## 2022-08-28 RX ADMIN — PANTOPRAZOLE SODIUM SCH MG: 40 INJECTION, POWDER, FOR SOLUTION INTRAVENOUS at 09:21

## 2022-08-28 RX ADMIN — FENTANYL CITRATE SCH: 0.05 INJECTION, SOLUTION INTRAMUSCULAR; INTRAVENOUS at 16:42

## 2022-08-28 RX ADMIN — INSULIN ASPART SCH UNITS: 100 INJECTION, SOLUTION INTRAVENOUS; SUBCUTANEOUS at 10:30

## 2022-08-28 RX ADMIN — INSULIN DETEMIR SCH UNITS: 100 INJECTION, SOLUTION SUBCUTANEOUS at 22:36

## 2022-08-28 RX ADMIN — HEPARIN SODIUM SCH UNIT: 5000 INJECTION, SOLUTION INTRAVENOUS; SUBCUTANEOUS at 06:14

## 2022-08-28 RX ADMIN — NOREPINEPHRINE BITARTRATE SCH: 1 INJECTION, SOLUTION, CONCENTRATE INTRAVENOUS at 13:06

## 2022-08-28 RX ADMIN — INSULIN ASPART SCH UNITS: 100 INJECTION, SOLUTION INTRAVENOUS; SUBCUTANEOUS at 06:11

## 2022-08-28 RX ADMIN — FENTANYL CITRATE SCH MLS/HR: 0.05 INJECTION, SOLUTION INTRAMUSCULAR; INTRAVENOUS at 09:22

## 2022-08-28 RX ADMIN — HEPARIN SODIUM SCH UNIT: 5000 INJECTION, SOLUTION INTRAVENOUS; SUBCUTANEOUS at 22:11

## 2022-08-28 RX ADMIN — Medication SCH MLS/HR: at 02:26

## 2022-08-28 RX ADMIN — INSULIN ASPART SCH UNITS: 100 INJECTION, SOLUTION INTRAVENOUS; SUBCUTANEOUS at 22:36

## 2022-08-28 RX ADMIN — PIPERACILLIN SODIUM AND TAZOBACTAM SODIUM SCH MLS/HR: .25; 2 INJECTION, POWDER, LYOPHILIZED, FOR SOLUTION INTRAVENOUS at 01:24

## 2022-08-28 RX ADMIN — HEPARIN SODIUM SCH UNIT: 5000 INJECTION, SOLUTION INTRAVENOUS; SUBCUTANEOUS at 14:05

## 2022-08-28 RX ADMIN — FENTANYL CITRATE SCH MLS/HR: 0.05 INJECTION, SOLUTION INTRAMUSCULAR; INTRAVENOUS at 14:26

## 2022-08-28 RX ADMIN — SODIUM CHLORIDE SCH MLS/HR: 4.5 INJECTION, SOLUTION INTRAVENOUS at 22:11

## 2022-08-28 RX ADMIN — HYDROCORTISONE SODIUM SUCCINATE SCH MG: 100 INJECTION, POWDER, FOR SOLUTION INTRAMUSCULAR; INTRAVENOUS at 18:14

## 2022-08-28 RX ADMIN — INSULIN ASPART SCH UNITS: 100 INJECTION, SOLUTION INTRAVENOUS; SUBCUTANEOUS at 16:42

## 2022-08-28 RX ADMIN — NOREPINEPHRINE BITARTRATE SCH MLS/HR: 1 INJECTION, SOLUTION, CONCENTRATE INTRAVENOUS at 16:44

## 2022-08-28 RX ADMIN — PIPERACILLIN SODIUM AND TAZOBACTAM SODIUM SCH MLS/HR: .25; 2 INJECTION, POWDER, LYOPHILIZED, FOR SOLUTION INTRAVENOUS at 18:20

## 2022-08-28 RX ADMIN — PIPERACILLIN SODIUM AND TAZOBACTAM SODIUM SCH MLS/HR: .25; 2 INJECTION, POWDER, LYOPHILIZED, FOR SOLUTION INTRAVENOUS at 09:21

## 2022-08-29 LAB
ALBUMIN SERPL-MCNC: 1.6 G/DL (ref 3.4–5)
ALP SERPL-CCNC: 169 U/L (ref 45–117)
ALT SERPL-CCNC: 27 U/L (ref 13–61)
ANION GAP SERPL CALC-SCNC: 9 MMOL/L (ref 8–16)
ANISOCYTOSIS BLD QL: (no result)
ARTERIAL PATENCY WRIST A: (no result)
ARTERIAL PATENCY WRIST A: POSITIVE
AST SERPL-CCNC: 62 U/L (ref 15–37)
BASE EXCESS BLDA CALC-SCNC: -11 MMOL/L (ref -2–2)
BASE EXCESS BLDA CALC-SCNC: 0.7 MMOL/L (ref -2–2)
BILIRUB SERPL-MCNC: 0.3 MG/DL (ref 0.2–1)
BREATHS.MECHANICAL ON VENT: 14
BREATHS.MECHANICAL ON VENT: 26
BUN SERPL-MCNC: 80.2 MG/DL (ref 7–18)
CALCIUM SERPL-MCNC: 7.4 MG/DL (ref 8.5–10.1)
CHLORIDE SERPL-SCNC: 96 MMOL/L (ref 98–107)
CO2 SERPL-SCNC: 25 MMOL/L (ref 21–32)
CREAT SERPL-MCNC: 6.4 MG/DL (ref 0.55–1.3)
DACRYOCYTES BLD QL SMEAR: (no result)
DEPRECATED RDW RBC AUTO: 15.5 % (ref 11.9–15.9)
GLUCOSE SERPL-MCNC: 240 MG/DL (ref 74–106)
HCT VFR BLD CALC: 29.8 % (ref 35.4–49)
HCT VFR BLDV CALC: 29 % (ref 35.4–49)
HCT VFR BLDV CALC: 32 % (ref 35.4–49)
HGB BLD-MCNC: 9.5 GM/DL (ref 11.7–16.9)
MACROCYTES BLD QL: (no result)
MAGNESIUM SERPL-MCNC: 2.5 MG/DL (ref 1.8–2.4)
MCH RBC QN AUTO: 31.7 PG (ref 25.7–33.7)
MCHC RBC AUTO-ENTMCNC: 31.8 G/DL (ref 32–35.9)
MCV RBC: 99.5 FL (ref 80–96)
PCO2 BLDA: 59.9 MMHG (ref 35–45)
PCO2 BLDA: 81.2 MMHG (ref 35–45)
PHOSPHATE SERPL-MCNC: 10.2 MG/DL (ref 2.5–4.9)
PLATELET # BLD AUTO: 182 10^3/UL (ref 134–434)
PMV BLD: 8.5 FL (ref 7.5–11.1)
PO2 BLDA: 104.1 MMHG (ref 80–100)
PO2 BLDA: 74.3 MMHG (ref 80–100)
PROT SERPL-MCNC: 5.4 G/DL (ref 6.4–8.2)
RBC # BLD AUTO: 3 M/MM3 (ref 4–5.6)
SAO2 % BLDA: 85.9 % (ref 95–98)
SAO2 % BLDA: 97 % (ref 95–98)
SODIUM SERPL-SCNC: 131 MMOL/L (ref 136–145)
VENTILATION MODE VENT: (no result)
WBC # BLD AUTO: 24.1 K/MM3 (ref 4–10)

## 2022-08-29 RX ADMIN — INSULIN ASPART SCH UNITS: 100 INJECTION, SOLUTION INTRAVENOUS; SUBCUTANEOUS at 17:19

## 2022-08-29 RX ADMIN — HYDROCORTISONE SODIUM SUCCINATE SCH MG: 100 INJECTION, POWDER, FOR SOLUTION INTRAMUSCULAR; INTRAVENOUS at 09:30

## 2022-08-29 RX ADMIN — INSULIN DETEMIR SCH UNITS: 100 INJECTION, SOLUTION SUBCUTANEOUS at 21:38

## 2022-08-29 RX ADMIN — Medication SCH MLS/HR: at 21:39

## 2022-08-29 RX ADMIN — HEPARIN SODIUM SCH UNIT: 5000 INJECTION, SOLUTION INTRAVENOUS; SUBCUTANEOUS at 21:31

## 2022-08-29 RX ADMIN — HYDROCORTISONE SODIUM SUCCINATE SCH MG: 100 INJECTION, POWDER, FOR SOLUTION INTRAMUSCULAR; INTRAVENOUS at 17:22

## 2022-08-29 RX ADMIN — FENTANYL CITRATE SCH MLS/HR: 0.05 INJECTION, SOLUTION INTRAMUSCULAR; INTRAVENOUS at 12:46

## 2022-08-29 RX ADMIN — PIPERACILLIN SODIUM AND TAZOBACTAM SODIUM SCH MLS/HR: .25; 2 INJECTION, POWDER, LYOPHILIZED, FOR SOLUTION INTRAVENOUS at 17:22

## 2022-08-29 RX ADMIN — PANTOPRAZOLE SODIUM SCH MG: 40 INJECTION, POWDER, FOR SOLUTION INTRAVENOUS at 09:30

## 2022-08-29 RX ADMIN — PIPERACILLIN SODIUM AND TAZOBACTAM SODIUM SCH MLS/HR: .25; 2 INJECTION, POWDER, LYOPHILIZED, FOR SOLUTION INTRAVENOUS at 09:29

## 2022-08-29 RX ADMIN — Medication SCH MLS/HR: at 09:31

## 2022-08-29 RX ADMIN — SODIUM CHLORIDE SCH MLS/HR: 4.5 INJECTION, SOLUTION INTRAVENOUS at 21:30

## 2022-08-29 RX ADMIN — LATANOPROST SCH DROP: 50 SOLUTION OPHTHALMIC at 21:31

## 2022-08-29 RX ADMIN — HEPARIN SODIUM SCH UNIT: 5000 INJECTION, SOLUTION INTRAVENOUS; SUBCUTANEOUS at 06:03

## 2022-08-29 RX ADMIN — FENTANYL CITRATE SCH MLS/HR: 0.05 INJECTION, SOLUTION INTRAMUSCULAR; INTRAVENOUS at 17:21

## 2022-08-29 RX ADMIN — PIPERACILLIN SODIUM AND TAZOBACTAM SODIUM SCH MLS/HR: .25; 2 INJECTION, POWDER, LYOPHILIZED, FOR SOLUTION INTRAVENOUS at 02:24

## 2022-08-29 RX ADMIN — FENTANYL CITRATE SCH MLS/HR: 0.05 INJECTION, SOLUTION INTRAMUSCULAR; INTRAVENOUS at 07:40

## 2022-08-29 RX ADMIN — HYDROCORTISONE SODIUM SUCCINATE SCH MG: 100 INJECTION, POWDER, FOR SOLUTION INTRAMUSCULAR; INTRAVENOUS at 02:24

## 2022-08-29 RX ADMIN — NOREPINEPHRINE BITARTRATE SCH MLS/HR: 1 INJECTION, SOLUTION, CONCENTRATE INTRAVENOUS at 12:45

## 2022-08-29 RX ADMIN — INSULIN ASPART SCH UNITS: 100 INJECTION, SOLUTION INTRAVENOUS; SUBCUTANEOUS at 21:38

## 2022-08-29 RX ADMIN — Medication SCH MLS/HR: at 02:24

## 2022-08-29 RX ADMIN — INSULIN ASPART SCH UNITS: 100 INJECTION, SOLUTION INTRAVENOUS; SUBCUTANEOUS at 06:08

## 2022-08-29 RX ADMIN — HEPARIN SODIUM SCH UNIT: 5000 INJECTION, SOLUTION INTRAVENOUS; SUBCUTANEOUS at 14:37

## 2022-08-29 RX ADMIN — INSULIN ASPART SCH UNITS: 100 INJECTION, SOLUTION INTRAVENOUS; SUBCUTANEOUS at 12:38

## 2022-08-30 LAB
ALBUMIN SERPL-MCNC: 1.4 G/DL (ref 3.4–5)
ALP SERPL-CCNC: 307 U/L (ref 45–117)
ALT SERPL-CCNC: 27 U/L (ref 13–61)
ANION GAP SERPL CALC-SCNC: 9 MMOL/L (ref 8–16)
ARTERIAL PATENCY WRIST A: (no result)
AST SERPL-CCNC: 69 U/L (ref 15–37)
BASE EXCESS BLDA CALC-SCNC: -0.8 MMOL/L (ref -2–2)
BASE EXCESS BLDA CALC-SCNC: -2.1 MMOL/L (ref -2–2)
BILIRUB SERPL-MCNC: 0.2 MG/DL (ref 0.2–1)
BREATHS.MECHANICAL ON VENT: 16
BREATHS.MECHANICAL ON VENT: 20
BUN SERPL-MCNC: 73.9 MG/DL (ref 7–18)
CALCIUM SERPL-MCNC: 7.3 MG/DL (ref 8.5–10.1)
CHLORIDE SERPL-SCNC: 98 MMOL/L (ref 98–107)
CO2 SERPL-SCNC: 27 MMOL/L (ref 21–32)
CREAT SERPL-MCNC: 5.1 MG/DL (ref 0.55–1.3)
DEPRECATED RDW RBC AUTO: 15.1 % (ref 11.9–15.9)
GLUCOSE SERPL-MCNC: 209 MG/DL (ref 74–106)
HCT VFR BLD CALC: 26.2 % (ref 35.4–49)
HCT VFR BLDV CALC: 26 % (ref 35.4–49)
HCT VFR BLDV CALC: 37 % (ref 35.4–49)
HGB BLD-MCNC: 8.3 GM/DL (ref 11.7–16.9)
MAGNESIUM SERPL-MCNC: 2.3 MG/DL (ref 1.8–2.4)
MCH RBC QN AUTO: 30.7 PG (ref 25.7–33.7)
MCHC RBC AUTO-ENTMCNC: 31.7 G/DL (ref 32–35.9)
MCV RBC: 96.6 FL (ref 80–96)
PCO2 BLDA: 53.9 MMHG (ref 35–45)
PCO2 BLDA: 53.9 MMHG (ref 35–45)
PHOSPHATE SERPL-MCNC: 5.9 MG/DL (ref 2.5–4.9)
PLATELET # BLD AUTO: 213 10^3/UL (ref 134–434)
PMV BLD: 8.7 FL (ref 7.5–11.1)
PO2 BLDA: 69.7 MMHG (ref 80–100)
PO2 BLDA: 69.8 MMHG (ref 80–100)
PROT SERPL-MCNC: 4.9 G/DL (ref 6.4–8.2)
RBC # BLD AUTO: 2.72 M/MM3 (ref 4–5.6)
SAO2 % BLDA: 91.6 % (ref 95–98)
SAO2 % BLDA: 92.2 % (ref 95–98)
SODIUM SERPL-SCNC: 135 MMOL/L (ref 136–145)
VENTILATION MODE VENT: (no result)
VENTILATION MODE VENT: (no result)
WBC # BLD AUTO: 14.8 K/MM3 (ref 4–10)

## 2022-08-30 PROCEDURE — 02HV33Z INSERTION OF INFUSION DEVICE INTO SUPERIOR VENA CAVA, PERCUTANEOUS APPROACH: ICD-10-PCS | Performed by: INTERNAL MEDICINE

## 2022-08-30 PROCEDURE — B548ZZA ULTRASONOGRAPHY OF SUPERIOR VENA CAVA, GUIDANCE: ICD-10-PCS | Performed by: INTERNAL MEDICINE

## 2022-08-30 RX ADMIN — ALBUMIN (HUMAN) SCH: 0.25 INJECTION, SOLUTION INTRAVENOUS at 13:30

## 2022-08-30 RX ADMIN — HYDROCORTISONE SODIUM SUCCINATE SCH MG: 100 INJECTION, POWDER, FOR SOLUTION INTRAMUSCULAR; INTRAVENOUS at 16:53

## 2022-08-30 RX ADMIN — PIPERACILLIN SODIUM AND TAZOBACTAM SODIUM SCH MLS/HR: .25; 2 INJECTION, POWDER, LYOPHILIZED, FOR SOLUTION INTRAVENOUS at 17:43

## 2022-08-30 RX ADMIN — PIPERACILLIN SODIUM AND TAZOBACTAM SODIUM SCH MLS/HR: .25; 2 INJECTION, POWDER, LYOPHILIZED, FOR SOLUTION INTRAVENOUS at 10:18

## 2022-08-30 RX ADMIN — HEPARIN SODIUM SCH UNIT: 5000 INJECTION, SOLUTION INTRAVENOUS; SUBCUTANEOUS at 21:40

## 2022-08-30 RX ADMIN — LATANOPROST SCH DROP: 50 SOLUTION OPHTHALMIC at 22:23

## 2022-08-30 RX ADMIN — Medication SCH MLS/HR: at 06:15

## 2022-08-30 RX ADMIN — HEPARIN SODIUM SCH UNIT: 5000 INJECTION, SOLUTION INTRAVENOUS; SUBCUTANEOUS at 06:16

## 2022-08-30 RX ADMIN — HYDROCORTISONE SODIUM SUCCINATE SCH MG: 100 INJECTION, POWDER, FOR SOLUTION INTRAMUSCULAR; INTRAVENOUS at 10:18

## 2022-08-30 RX ADMIN — ALBUMIN (HUMAN) SCH: 0.25 INJECTION, SOLUTION INTRAVENOUS at 12:30

## 2022-08-30 RX ADMIN — PANTOPRAZOLE SODIUM SCH MG: 40 INJECTION, POWDER, FOR SOLUTION INTRAVENOUS at 10:18

## 2022-08-30 RX ADMIN — INSULIN DETEMIR SCH UNITS: 100 INJECTION, SOLUTION SUBCUTANEOUS at 21:42

## 2022-08-30 RX ADMIN — ALBUMIN (HUMAN) SCH: 0.25 INJECTION, SOLUTION INTRAVENOUS at 13:00

## 2022-08-30 RX ADMIN — INSULIN ASPART SCH UNITS: 100 INJECTION, SOLUTION INTRAVENOUS; SUBCUTANEOUS at 07:42

## 2022-08-30 RX ADMIN — HYDROCORTISONE SODIUM SUCCINATE SCH MG: 100 INJECTION, POWDER, FOR SOLUTION INTRAMUSCULAR; INTRAVENOUS at 02:16

## 2022-08-30 RX ADMIN — ALBUMIN (HUMAN) SCH: 0.25 INJECTION, SOLUTION INTRAVENOUS at 14:00

## 2022-08-30 RX ADMIN — NOREPINEPHRINE BITARTRATE SCH: 1 INJECTION, SOLUTION, CONCENTRATE INTRAVENOUS at 19:31

## 2022-08-30 RX ADMIN — PIPERACILLIN SODIUM AND TAZOBACTAM SODIUM SCH MLS/HR: .25; 2 INJECTION, POWDER, LYOPHILIZED, FOR SOLUTION INTRAVENOUS at 02:16

## 2022-08-30 RX ADMIN — INSULIN ASPART SCH UNITS: 100 INJECTION, SOLUTION INTRAVENOUS; SUBCUTANEOUS at 21:46

## 2022-08-30 RX ADMIN — FENTANYL CITRATE SCH MLS/HR: 0.05 INJECTION, SOLUTION INTRAMUSCULAR; INTRAVENOUS at 12:21

## 2022-08-30 RX ADMIN — INSULIN ASPART SCH UNITS: 100 INJECTION, SOLUTION INTRAVENOUS; SUBCUTANEOUS at 16:52

## 2022-08-30 RX ADMIN — HEPARIN SODIUM SCH UNIT: 5000 INJECTION, SOLUTION INTRAVENOUS; SUBCUTANEOUS at 16:52

## 2022-08-30 RX ADMIN — INSULIN ASPART SCH: 100 INJECTION, SOLUTION INTRAVENOUS; SUBCUTANEOUS at 13:51

## 2022-08-30 RX ADMIN — FENTANYL CITRATE SCH MLS/HR: 0.05 INJECTION, SOLUTION INTRAMUSCULAR; INTRAVENOUS at 17:47

## 2022-08-30 RX ADMIN — FENTANYL CITRATE SCH MLS/HR: 0.05 INJECTION, SOLUTION INTRAMUSCULAR; INTRAVENOUS at 23:48

## 2022-08-31 LAB
ALBUMIN SERPL-MCNC: 1.5 G/DL (ref 3.4–5)
ALP SERPL-CCNC: 273 U/L (ref 45–117)
ALT SERPL-CCNC: 27 U/L (ref 13–61)
ANION GAP SERPL CALC-SCNC: 12 MMOL/L (ref 8–16)
APPEARANCE UR: (no result)
AST SERPL-CCNC: 59 U/L (ref 15–37)
BACTERIA # UR AUTO: 17 /UL (ref 0–1359)
BASE EXCESS BLDA CALC-SCNC: -3.3 MMOL/L (ref -2–2)
BILIRUB SERPL-MCNC: 0.4 MG/DL (ref 0.2–1)
BILIRUB UR STRIP.AUTO-MCNC: NEGATIVE MG/DL
BREATHS.MECHANICAL ON VENT: 25
BUN SERPL-MCNC: 67.9 MG/DL (ref 7–18)
CALCIUM SERPL-MCNC: 7.5 MG/DL (ref 8.5–10.1)
CASTS URNS QL MICRO: 4 /UL (ref 0–3.1)
CHLORIDE SERPL-SCNC: 98 MMOL/L (ref 98–107)
CO2 SERPL-SCNC: 27 MMOL/L (ref 21–32)
COLOR UR: YELLOW
CREAT SERPL-MCNC: 4.4 MG/DL (ref 0.55–1.3)
DEPRECATED RDW RBC AUTO: 14.6 % (ref 11.9–15.9)
EPITH CASTS URNS QL MICRO: 33 /UL (ref 0–25.1)
GLUCOSE SERPL-MCNC: 230 MG/DL (ref 74–106)
HCT VFR BLD CALC: 26 % (ref 35.4–49)
HCT VFR BLDV CALC: 32 % (ref 35.4–49)
HGB BLD-MCNC: 8.2 GM/DL (ref 11.7–16.9)
KETONES UR QL STRIP: (no result)
LEUKOCYTE ESTERASE UR QL STRIP.AUTO: (no result)
MAGNESIUM SERPL-MCNC: 2.4 MG/DL (ref 1.8–2.4)
MCH RBC QN AUTO: 30.4 PG (ref 25.7–33.7)
MCHC RBC AUTO-ENTMCNC: 31.7 G/DL (ref 32–35.9)
MCV RBC: 96 FL (ref 80–96)
NITRITE UR QL STRIP: NEGATIVE
PCO2 BLDA: 44.7 MMHG (ref 35–45)
PH UR: 5 [PH] (ref 5–8)
PHOSPHATE SERPL-MCNC: 5.7 MG/DL (ref 2.5–4.9)
PLATELET # BLD AUTO: 248 10^3/UL (ref 134–434)
PMV BLD: 8.6 FL (ref 7.5–11.1)
PO2 BLDA: 77.7 MMHG (ref 80–100)
PROT SERPL-MCNC: 5 G/DL (ref 6.4–8.2)
PROT UR QL STRIP: (no result)
PROT UR QL STRIP: NEGATIVE
RBC # BLD AUTO: 2.71 M/MM3 (ref 4–5.6)
RBC # BLD AUTO: 78 /UL (ref 0–23.9)
SAO2 % BLDA: 94.5 % (ref 95–98)
SODIUM SERPL-SCNC: 137 MMOL/L (ref 136–145)
SP GR UR: 1.02 (ref 1.01–1.03)
UROBILINOGEN UR STRIP-MCNC: 0.2 MG/DL (ref 0.2–1)
VENTILATION MODE VENT: (no result)
WBC # BLD AUTO: 12.9 K/MM3 (ref 4–10)
WBC # UR AUTO: 117 /UL (ref 0–25.8)

## 2022-08-31 RX ADMIN — HEPARIN SODIUM SCH UNIT: 5000 INJECTION, SOLUTION INTRAVENOUS; SUBCUTANEOUS at 21:35

## 2022-08-31 RX ADMIN — PANTOPRAZOLE SODIUM SCH MG: 40 INJECTION, POWDER, FOR SOLUTION INTRAVENOUS at 09:19

## 2022-08-31 RX ADMIN — Medication SCH: at 00:57

## 2022-08-31 RX ADMIN — FENTANYL CITRATE SCH MLS/HR: 0.05 INJECTION, SOLUTION INTRAMUSCULAR; INTRAVENOUS at 04:50

## 2022-08-31 RX ADMIN — HYDROCORTISONE SODIUM SUCCINATE SCH MG: 100 INJECTION, POWDER, FOR SOLUTION INTRAMUSCULAR; INTRAVENOUS at 09:20

## 2022-08-31 RX ADMIN — INSULIN ASPART SCH UNITS: 100 INJECTION, SOLUTION INTRAVENOUS; SUBCUTANEOUS at 17:28

## 2022-08-31 RX ADMIN — PIPERACILLIN SODIUM AND TAZOBACTAM SODIUM SCH MLS/HR: .25; 2 INJECTION, POWDER, LYOPHILIZED, FOR SOLUTION INTRAVENOUS at 17:29

## 2022-08-31 RX ADMIN — INSULIN DETEMIR SCH UNITS: 100 INJECTION, SOLUTION SUBCUTANEOUS at 21:36

## 2022-08-31 RX ADMIN — LATANOPROST SCH DROP: 50 SOLUTION OPHTHALMIC at 21:36

## 2022-08-31 RX ADMIN — HEPARIN SODIUM SCH UNIT: 5000 INJECTION, SOLUTION INTRAVENOUS; SUBCUTANEOUS at 06:37

## 2022-08-31 RX ADMIN — FENTANYL CITRATE SCH MLS/HR: 0.05 INJECTION, SOLUTION INTRAMUSCULAR; INTRAVENOUS at 21:34

## 2022-08-31 RX ADMIN — NOREPINEPHRINE BITARTRATE SCH MLS/HR: 1 INJECTION, SOLUTION, CONCENTRATE INTRAVENOUS at 21:33

## 2022-08-31 RX ADMIN — PIPERACILLIN SODIUM AND TAZOBACTAM SODIUM SCH MLS/HR: .25; 2 INJECTION, POWDER, LYOPHILIZED, FOR SOLUTION INTRAVENOUS at 01:01

## 2022-08-31 RX ADMIN — HEPARIN SODIUM SCH UNIT: 5000 INJECTION, SOLUTION INTRAVENOUS; SUBCUTANEOUS at 13:29

## 2022-08-31 RX ADMIN — INSULIN ASPART SCH UNITS: 100 INJECTION, SOLUTION INTRAVENOUS; SUBCUTANEOUS at 12:00

## 2022-08-31 RX ADMIN — HYDROCORTISONE SODIUM SUCCINATE SCH MG: 100 INJECTION, POWDER, FOR SOLUTION INTRAMUSCULAR; INTRAVENOUS at 00:57

## 2022-08-31 RX ADMIN — HYDROCORTISONE SODIUM SUCCINATE SCH MG: 100 INJECTION, POWDER, FOR SOLUTION INTRAMUSCULAR; INTRAVENOUS at 17:29

## 2022-08-31 RX ADMIN — PIPERACILLIN SODIUM AND TAZOBACTAM SODIUM SCH MLS/HR: .25; 2 INJECTION, POWDER, LYOPHILIZED, FOR SOLUTION INTRAVENOUS at 09:20

## 2022-08-31 RX ADMIN — INSULIN ASPART SCH UNITS: 100 INJECTION, SOLUTION INTRAVENOUS; SUBCUTANEOUS at 06:37

## 2022-08-31 RX ADMIN — INSULIN ASPART SCH UNITS: 100 INJECTION, SOLUTION INTRAVENOUS; SUBCUTANEOUS at 21:44

## 2022-09-01 LAB
ALBUMIN SERPL-MCNC: 1.4 G/DL (ref 3.4–5)
ALP SERPL-CCNC: 192 U/L (ref 45–117)
ALT SERPL-CCNC: 23 U/L (ref 13–61)
ANION GAP SERPL CALC-SCNC: 13 MMOL/L (ref 8–16)
AST SERPL-CCNC: 28 U/L (ref 15–37)
BILIRUB SERPL-MCNC: 0.7 MG/DL (ref 0.2–1)
BUN SERPL-MCNC: 108.5 MG/DL (ref 7–18)
CALCIUM SERPL-MCNC: 7.3 MG/DL (ref 8.5–10.1)
CHLORIDE SERPL-SCNC: 95 MMOL/L (ref 98–107)
CO2 SERPL-SCNC: 25 MMOL/L (ref 21–32)
CREAT SERPL-MCNC: 5.4 MG/DL (ref 0.55–1.3)
DEPRECATED RDW RBC AUTO: 14.9 % (ref 11.9–15.9)
GLUCOSE SERPL-MCNC: 370 MG/DL (ref 74–106)
HCT VFR BLD CALC: 24.2 % (ref 35.4–49)
HGB BLD-MCNC: 7.8 GM/DL (ref 11.7–16.9)
MAGNESIUM SERPL-MCNC: 2.5 MG/DL (ref 1.8–2.4)
MCH RBC QN AUTO: 31.2 PG (ref 25.7–33.7)
MCHC RBC AUTO-ENTMCNC: 32.1 G/DL (ref 32–35.9)
MCV RBC: 97.3 FL (ref 80–96)
PHOSPHATE SERPL-MCNC: 8.4 MG/DL (ref 2.5–4.9)
PLATELET # BLD AUTO: 276 10^3/UL (ref 134–434)
PMV BLD: 9 FL (ref 7.5–11.1)
PROT SERPL-MCNC: 4.6 G/DL (ref 6.4–8.2)
RBC # BLD AUTO: 2.49 M/MM3 (ref 4–5.6)
SODIUM SERPL-SCNC: 133 MMOL/L (ref 136–145)
WBC # BLD AUTO: 10.6 K/MM3 (ref 4–10)

## 2022-09-01 RX ADMIN — INSULIN ASPART SCH UNITS: 100 INJECTION, SOLUTION INTRAVENOUS; SUBCUTANEOUS at 19:15

## 2022-09-01 RX ADMIN — INSULIN ASPART SCH UNITS: 100 INJECTION, SOLUTION INTRAVENOUS; SUBCUTANEOUS at 21:36

## 2022-09-01 RX ADMIN — HEPARIN SODIUM SCH UNIT: 5000 INJECTION, SOLUTION INTRAVENOUS; SUBCUTANEOUS at 21:42

## 2022-09-01 RX ADMIN — NOREPINEPHRINE BITARTRATE SCH: 1 INJECTION, SOLUTION, CONCENTRATE INTRAVENOUS at 21:21

## 2022-09-01 RX ADMIN — PANTOPRAZOLE SODIUM SCH MG: 40 INJECTION, POWDER, FOR SOLUTION INTRAVENOUS at 10:14

## 2022-09-01 RX ADMIN — LATANOPROST SCH DROP: 50 SOLUTION OPHTHALMIC at 21:23

## 2022-09-01 RX ADMIN — PIPERACILLIN SODIUM AND TAZOBACTAM SODIUM SCH MLS/HR: .25; 2 INJECTION, POWDER, LYOPHILIZED, FOR SOLUTION INTRAVENOUS at 02:19

## 2022-09-01 RX ADMIN — HEPARIN SODIUM SCH UNIT: 5000 INJECTION, SOLUTION INTRAVENOUS; SUBCUTANEOUS at 16:24

## 2022-09-01 RX ADMIN — PIPERACILLIN SODIUM AND TAZOBACTAM SODIUM SCH MLS/HR: .25; 2 INJECTION, POWDER, LYOPHILIZED, FOR SOLUTION INTRAVENOUS at 10:14

## 2022-09-01 RX ADMIN — INSULIN ASPART SCH UNITS: 100 INJECTION, SOLUTION INTRAVENOUS; SUBCUTANEOUS at 06:40

## 2022-09-01 RX ADMIN — HYDROCORTISONE SODIUM SUCCINATE SCH MG: 100 INJECTION, POWDER, FOR SOLUTION INTRAMUSCULAR; INTRAVENOUS at 21:23

## 2022-09-01 RX ADMIN — HYDROCORTISONE SODIUM SUCCINATE SCH MG: 100 INJECTION, POWDER, FOR SOLUTION INTRAMUSCULAR; INTRAVENOUS at 02:19

## 2022-09-01 RX ADMIN — HEPARIN SODIUM SCH UNIT: 5000 INJECTION, SOLUTION INTRAVENOUS; SUBCUTANEOUS at 06:19

## 2022-09-01 RX ADMIN — INSULIN DETEMIR SCH UNITS: 100 INJECTION, SOLUTION SUBCUTANEOUS at 21:38

## 2022-09-01 RX ADMIN — INSULIN ASPART SCH UNITS: 100 INJECTION, SOLUTION INTRAVENOUS; SUBCUTANEOUS at 12:00

## 2022-09-01 RX ADMIN — HYDROCORTISONE SODIUM SUCCINATE SCH MG: 100 INJECTION, POWDER, FOR SOLUTION INTRAMUSCULAR; INTRAVENOUS at 10:14

## 2022-09-01 RX ADMIN — Medication SCH MLS/HR: at 01:30

## 2022-09-01 RX ADMIN — HYDROCORTISONE SODIUM SUCCINATE SCH: 100 INJECTION, POWDER, FOR SOLUTION INTRAMUSCULAR; INTRAVENOUS at 11:14

## 2022-09-01 RX ADMIN — FENTANYL CITRATE SCH MLS/HR: 0.05 INJECTION, SOLUTION INTRAMUSCULAR; INTRAVENOUS at 21:22

## 2022-09-01 RX ADMIN — PIPERACILLIN SODIUM AND TAZOBACTAM SODIUM SCH MLS/HR: .25; 2 INJECTION, POWDER, LYOPHILIZED, FOR SOLUTION INTRAVENOUS at 17:45

## 2022-09-02 LAB
ALBUMIN SERPL-MCNC: 1.6 G/DL (ref 3.4–5)
ALP SERPL-CCNC: 172 U/L (ref 45–117)
ALT SERPL-CCNC: 19 U/L (ref 13–61)
ANION GAP SERPL CALC-SCNC: 14 MMOL/L (ref 8–16)
AST SERPL-CCNC: 20 U/L (ref 15–37)
BILIRUB SERPL-MCNC: 0.4 MG/DL (ref 0.2–1)
BUN SERPL-MCNC: 136.4 MG/DL (ref 7–18)
CALCIUM SERPL-MCNC: 7.2 MG/DL (ref 8.5–10.1)
CHLORIDE SERPL-SCNC: 97 MMOL/L (ref 98–107)
CO2 SERPL-SCNC: 22 MMOL/L (ref 21–32)
CREAT SERPL-MCNC: 6.2 MG/DL (ref 0.55–1.3)
DEPRECATED RDW RBC AUTO: 14.7 % (ref 11.9–15.9)
GLUCOSE SERPL-MCNC: 324 MG/DL (ref 74–106)
HCT VFR BLD CALC: 24.3 % (ref 35.4–49)
HGB BLD-MCNC: 7.9 GM/DL (ref 11.7–16.9)
MAGNESIUM SERPL-MCNC: 2.6 MG/DL (ref 1.8–2.4)
MCH RBC QN AUTO: 30.9 PG (ref 25.7–33.7)
MCHC RBC AUTO-ENTMCNC: 32.5 G/DL (ref 32–35.9)
MCV RBC: 95.3 FL (ref 80–96)
PHOSPHATE SERPL-MCNC: 8.5 MG/DL (ref 2.5–4.9)
PLATELET # BLD AUTO: 339 10^3/UL (ref 134–434)
PMV BLD: 9.2 FL (ref 7.5–11.1)
PROT SERPL-MCNC: 4.7 G/DL (ref 6.4–8.2)
RBC # BLD AUTO: 2.55 M/MM3 (ref 4–5.6)
SODIUM SERPL-SCNC: 133 MMOL/L (ref 136–145)
WBC # BLD AUTO: 14 K/MM3 (ref 4–10)

## 2022-09-02 PROCEDURE — B548ZZA ULTRASONOGRAPHY OF SUPERIOR VENA CAVA, GUIDANCE: ICD-10-PCS | Performed by: INTERNAL MEDICINE

## 2022-09-02 PROCEDURE — 02HV33Z INSERTION OF INFUSION DEVICE INTO SUPERIOR VENA CAVA, PERCUTANEOUS APPROACH: ICD-10-PCS | Performed by: INTERNAL MEDICINE

## 2022-09-02 RX ADMIN — ALBUMIN (HUMAN) SCH GM: 0.25 INJECTION, SOLUTION INTRAVENOUS at 16:55

## 2022-09-02 RX ADMIN — ALBUMIN (HUMAN) SCH: 0.25 INJECTION, SOLUTION INTRAVENOUS at 17:43

## 2022-09-02 RX ADMIN — PROPOFOL SCH MLS/HR: 10 INJECTION, EMULSION INTRAVENOUS at 12:47

## 2022-09-02 RX ADMIN — HEPARIN SODIUM SCH UNIT: 5000 INJECTION, SOLUTION INTRAVENOUS; SUBCUTANEOUS at 06:35

## 2022-09-02 RX ADMIN — ALBUMIN (HUMAN) SCH: 0.25 INJECTION, SOLUTION INTRAVENOUS at 17:42

## 2022-09-02 RX ADMIN — PIPERACILLIN SODIUM AND TAZOBACTAM SODIUM SCH MLS/HR: .25; 2 INJECTION, POWDER, LYOPHILIZED, FOR SOLUTION INTRAVENOUS at 01:16

## 2022-09-02 RX ADMIN — INSULIN ASPART SCH UNITS: 100 INJECTION, SOLUTION INTRAVENOUS; SUBCUTANEOUS at 06:35

## 2022-09-02 RX ADMIN — HEPARIN SODIUM SCH UNIT: 5000 INJECTION, SOLUTION INTRAVENOUS; SUBCUTANEOUS at 21:56

## 2022-09-02 RX ADMIN — NOREPINEPHRINE BITARTRATE SCH: 1 INJECTION, SOLUTION, CONCENTRATE INTRAVENOUS at 11:12

## 2022-09-02 RX ADMIN — INSULIN ASPART SCH UNITS: 100 INJECTION, SOLUTION INTRAVENOUS; SUBCUTANEOUS at 10:48

## 2022-09-02 RX ADMIN — HEPARIN SODIUM SCH UNIT: 5000 INJECTION, SOLUTION INTRAVENOUS; SUBCUTANEOUS at 13:58

## 2022-09-02 RX ADMIN — ALBUMIN (HUMAN) SCH: 0.25 INJECTION, SOLUTION INTRAVENOUS at 17:44

## 2022-09-02 RX ADMIN — FENTANYL CITRATE SCH MLS/HR: 0.05 INJECTION, SOLUTION INTRAMUSCULAR; INTRAVENOUS at 16:55

## 2022-09-02 RX ADMIN — INSULIN ASPART SCH UNITS: 100 INJECTION, SOLUTION INTRAVENOUS; SUBCUTANEOUS at 15:53

## 2022-09-02 RX ADMIN — NOREPINEPHRINE BITARTRATE SCH MLS/HR: 1 INJECTION, SOLUTION, CONCENTRATE INTRAVENOUS at 16:51

## 2022-09-02 RX ADMIN — INSULIN ASPART SCH UNITS: 100 INJECTION, SOLUTION INTRAVENOUS; SUBCUTANEOUS at 21:56

## 2022-09-02 RX ADMIN — Medication SCH MLS/HR: at 16:56

## 2022-09-02 RX ADMIN — INSULIN DETEMIR SCH UNITS: 100 INJECTION, SOLUTION SUBCUTANEOUS at 21:58

## 2022-09-02 RX ADMIN — HYDROCORTISONE SODIUM SUCCINATE SCH MG: 100 INJECTION, POWDER, FOR SOLUTION INTRAMUSCULAR; INTRAVENOUS at 21:57

## 2022-09-02 RX ADMIN — PIPERACILLIN SODIUM AND TAZOBACTAM SODIUM SCH MLS/HR: .25; 2 INJECTION, POWDER, LYOPHILIZED, FOR SOLUTION INTRAVENOUS at 18:45

## 2022-09-02 RX ADMIN — PIPERACILLIN SODIUM AND TAZOBACTAM SODIUM SCH MLS/HR: .25; 2 INJECTION, POWDER, LYOPHILIZED, FOR SOLUTION INTRAVENOUS at 09:30

## 2022-09-02 RX ADMIN — LATANOPROST SCH DROP: 50 SOLUTION OPHTHALMIC at 21:56

## 2022-09-02 RX ADMIN — FENTANYL CITRATE SCH MLS/HR: 0.05 INJECTION, SOLUTION INTRAMUSCULAR; INTRAVENOUS at 08:44

## 2022-09-02 RX ADMIN — HYDROCORTISONE SODIUM SUCCINATE SCH MG: 100 INJECTION, POWDER, FOR SOLUTION INTRAMUSCULAR; INTRAVENOUS at 09:30

## 2022-09-02 RX ADMIN — Medication SCH MLS/HR: at 01:16

## 2022-09-02 RX ADMIN — PANTOPRAZOLE SODIUM SCH MG: 40 INJECTION, POWDER, FOR SOLUTION INTRAVENOUS at 09:30

## 2022-09-03 LAB
ALBUMIN SERPL-MCNC: 1.8 G/DL (ref 3.4–5)
ALP SERPL-CCNC: 139 U/L (ref 45–117)
ALT SERPL-CCNC: 21 U/L (ref 13–61)
ANION GAP SERPL CALC-SCNC: 10 MMOL/L (ref 8–16)
ARTERIAL PATENCY WRIST A: POSITIVE
AST SERPL-CCNC: 20 U/L (ref 15–37)
BASE EXCESS BLDA CALC-SCNC: 2.8 MMOL/L (ref -2–2)
BILIRUB SERPL-MCNC: 0.4 MG/DL (ref 0.2–1)
BREATHS.MECHANICAL ON VENT: 18
BUN SERPL-MCNC: 84.8 MG/DL (ref 7–18)
CALCIUM SERPL-MCNC: 7.3 MG/DL (ref 8.5–10.1)
CHLORIDE SERPL-SCNC: 103 MMOL/L (ref 98–107)
CO2 SERPL-SCNC: 29 MMOL/L (ref 21–32)
CREAT SERPL-MCNC: 3.9 MG/DL (ref 0.55–1.3)
DEPRECATED RDW RBC AUTO: 14.3 % (ref 11.9–15.9)
GLUCOSE SERPL-MCNC: 208 MG/DL (ref 74–106)
HCT VFR BLD CALC: 24.4 % (ref 35.4–49)
HCT VFR BLDV CALC: 25 % (ref 35.4–49)
HGB BLD-MCNC: 8.1 GM/DL (ref 11.7–16.9)
MAGNESIUM SERPL-MCNC: 2.3 MG/DL (ref 1.8–2.4)
MCH RBC QN AUTO: 31.4 PG (ref 25.7–33.7)
MCHC RBC AUTO-ENTMCNC: 33.2 G/DL (ref 32–35.9)
MCV RBC: 94.4 FL (ref 80–96)
PCO2 BLDA: 46.5 MMHG (ref 35–45)
PHOSPHATE SERPL-MCNC: 6.2 MG/DL (ref 2.5–4.9)
PLATELET # BLD AUTO: 385 10^3/UL (ref 134–434)
PMV BLD: 8.5 FL (ref 7.5–11.1)
PO2 BLDA: 103.8 MMHG (ref 80–100)
PROT SERPL-MCNC: 5 G/DL (ref 6.4–8.2)
RBC # BLD AUTO: 2.59 M/MM3 (ref 4–5.6)
SAO2 % BLDA: 97.7 % (ref 95–98)
SODIUM SERPL-SCNC: 142 MMOL/L (ref 136–145)
VENTILATION MODE VENT: (no result)
WBC # BLD AUTO: 16.6 K/MM3 (ref 4–10)

## 2022-09-03 RX ADMIN — PIPERACILLIN SODIUM AND TAZOBACTAM SODIUM SCH MLS/HR: .25; 2 INJECTION, POWDER, LYOPHILIZED, FOR SOLUTION INTRAVENOUS at 02:49

## 2022-09-03 RX ADMIN — INSULIN DETEMIR SCH UNITS: 100 INJECTION, SOLUTION SUBCUTANEOUS at 06:57

## 2022-09-03 RX ADMIN — PIPERACILLIN SODIUM AND TAZOBACTAM SODIUM SCH MLS/HR: .25; 2 INJECTION, POWDER, LYOPHILIZED, FOR SOLUTION INTRAVENOUS at 17:17

## 2022-09-03 RX ADMIN — INSULIN ASPART SCH: 100 INJECTION, SOLUTION INTRAVENOUS; SUBCUTANEOUS at 22:18

## 2022-09-03 RX ADMIN — PIPERACILLIN SODIUM AND TAZOBACTAM SODIUM SCH MLS/HR: .25; 2 INJECTION, POWDER, LYOPHILIZED, FOR SOLUTION INTRAVENOUS at 10:45

## 2022-09-03 RX ADMIN — FENTANYL CITRATE SCH MLS/HR: 0.05 INJECTION, SOLUTION INTRAMUSCULAR; INTRAVENOUS at 09:00

## 2022-09-03 RX ADMIN — HYDROCORTISONE SODIUM SUCCINATE SCH MG: 100 INJECTION, POWDER, FOR SOLUTION INTRAMUSCULAR; INTRAVENOUS at 22:19

## 2022-09-03 RX ADMIN — Medication SCH MLS/HR: at 15:00

## 2022-09-03 RX ADMIN — INSULIN ASPART SCH UNITS: 100 INJECTION, SOLUTION INTRAVENOUS; SUBCUTANEOUS at 06:58

## 2022-09-03 RX ADMIN — INSULIN ASPART SCH: 100 INJECTION, SOLUTION INTRAVENOUS; SUBCUTANEOUS at 17:18

## 2022-09-03 RX ADMIN — INSULIN ASPART SCH: 100 INJECTION, SOLUTION INTRAVENOUS; SUBCUTANEOUS at 11:20

## 2022-09-03 RX ADMIN — Medication SCH MLS/HR: at 06:50

## 2022-09-03 RX ADMIN — INSULIN DETEMIR SCH UNITS: 100 INJECTION, SOLUTION SUBCUTANEOUS at 22:18

## 2022-09-03 RX ADMIN — NOREPINEPHRINE BITARTRATE SCH: 1 INJECTION, SOLUTION, CONCENTRATE INTRAVENOUS at 14:59

## 2022-09-03 RX ADMIN — FENTANYL CITRATE SCH: 0.05 INJECTION, SOLUTION INTRAMUSCULAR; INTRAVENOUS at 17:18

## 2022-09-03 RX ADMIN — PROPOFOL SCH: 10 INJECTION, EMULSION INTRAVENOUS at 14:59

## 2022-09-03 RX ADMIN — HEPARIN SODIUM SCH UNIT: 5000 INJECTION, SOLUTION INTRAVENOUS; SUBCUTANEOUS at 14:58

## 2022-09-03 RX ADMIN — LATANOPROST SCH DROP: 50 SOLUTION OPHTHALMIC at 22:19

## 2022-09-03 RX ADMIN — HEPARIN SODIUM SCH UNIT: 5000 INJECTION, SOLUTION INTRAVENOUS; SUBCUTANEOUS at 22:18

## 2022-09-03 RX ADMIN — HYDROCORTISONE SODIUM SUCCINATE SCH MG: 100 INJECTION, POWDER, FOR SOLUTION INTRAMUSCULAR; INTRAVENOUS at 11:15

## 2022-09-03 RX ADMIN — DEXMEDETOMIDINE HYDROCHLORIDE SCH MLS/HR: 4 INJECTION, SOLUTION INTRAVENOUS at 23:08

## 2022-09-03 RX ADMIN — PANTOPRAZOLE SODIUM SCH MG: 40 INJECTION, POWDER, FOR SOLUTION INTRAVENOUS at 10:40

## 2022-09-03 RX ADMIN — HEPARIN SODIUM SCH UNIT: 5000 INJECTION, SOLUTION INTRAVENOUS; SUBCUTANEOUS at 06:49

## 2022-09-04 LAB
ANION GAP SERPL CALC-SCNC: 13 MMOL/L (ref 8–16)
ARTERIAL PATENCY WRIST A: (no result)
BASE EXCESS BLDA CALC-SCNC: -0.8 MMOL/L (ref -2–2)
BREATHS.MECHANICAL ON VENT: 18
BUN SERPL-MCNC: 92.6 MG/DL (ref 7–18)
CALCIUM SERPL-MCNC: 7.2 MG/DL (ref 8.5–10.1)
CHLORIDE SERPL-SCNC: 102 MMOL/L (ref 98–107)
CO2 SERPL-SCNC: 27 MMOL/L (ref 21–32)
CREAT SERPL-MCNC: 4.5 MG/DL (ref 0.55–1.3)
DEPRECATED RDW RBC AUTO: 14.7 % (ref 11.9–15.9)
GLUCOSE SERPL-MCNC: 131 MG/DL (ref 74–106)
HCT VFR BLD CALC: 26 % (ref 35.4–49)
HCT VFR BLDV CALC: 25 % (ref 35.4–49)
HGB BLD-MCNC: 8.7 GM/DL (ref 11.7–16.9)
MAGNESIUM SERPL-MCNC: 2.3 MG/DL (ref 1.8–2.4)
MCH RBC QN AUTO: 31.6 PG (ref 25.7–33.7)
MCHC RBC AUTO-ENTMCNC: 33.3 G/DL (ref 32–35.9)
MCV RBC: 94.9 FL (ref 80–96)
PCO2 BLDA: 37.6 MMHG (ref 35–45)
PHOSPHATE SERPL-MCNC: 7.2 MG/DL (ref 2.5–4.9)
PLATELET # BLD AUTO: 455 10^3/UL (ref 134–434)
PMV BLD: 9.2 FL (ref 7.5–11.1)
PO2 BLDA: 130.9 MMHG (ref 80–100)
RBC # BLD AUTO: 2.74 M/MM3 (ref 4–5.6)
SAO2 % BLDA: 98.7 % (ref 95–98)
SODIUM SERPL-SCNC: 141 MMOL/L (ref 136–145)
VENTILATION MODE VENT: (no result)
WBC # BLD AUTO: 16.6 K/MM3 (ref 4–10)

## 2022-09-04 RX ADMIN — PROPOFOL SCH: 10 INJECTION, EMULSION INTRAVENOUS at 13:51

## 2022-09-04 RX ADMIN — PIPERACILLIN SODIUM AND TAZOBACTAM SODIUM SCH MLS/HR: .25; 2 INJECTION, POWDER, LYOPHILIZED, FOR SOLUTION INTRAVENOUS at 09:44

## 2022-09-04 RX ADMIN — PANTOPRAZOLE SODIUM SCH MG: 40 INJECTION, POWDER, FOR SOLUTION INTRAVENOUS at 09:44

## 2022-09-04 RX ADMIN — PIPERACILLIN SODIUM AND TAZOBACTAM SODIUM SCH MLS/HR: .25; 2 INJECTION, POWDER, LYOPHILIZED, FOR SOLUTION INTRAVENOUS at 01:23

## 2022-09-04 RX ADMIN — INSULIN ASPART SCH: 100 INJECTION, SOLUTION INTRAVENOUS; SUBCUTANEOUS at 06:08

## 2022-09-04 RX ADMIN — HYDROCORTISONE SODIUM SUCCINATE SCH MG: 100 INJECTION, POWDER, FOR SOLUTION INTRAMUSCULAR; INTRAVENOUS at 09:44

## 2022-09-04 RX ADMIN — INSULIN ASPART SCH: 100 INJECTION, SOLUTION INTRAVENOUS; SUBCUTANEOUS at 11:30

## 2022-09-04 RX ADMIN — HEPARIN SODIUM SCH UNIT: 5000 INJECTION, SOLUTION INTRAVENOUS; SUBCUTANEOUS at 06:07

## 2022-09-04 RX ADMIN — FENTANYL CITRATE SCH: 0.05 INJECTION, SOLUTION INTRAMUSCULAR; INTRAVENOUS at 16:58

## 2022-09-04 RX ADMIN — LATANOPROST SCH DROP: 50 SOLUTION OPHTHALMIC at 21:56

## 2022-09-04 RX ADMIN — DEXMEDETOMIDINE HYDROCHLORIDE SCH MLS/HR: 4 INJECTION, SOLUTION INTRAVENOUS at 08:00

## 2022-09-04 RX ADMIN — DEXMEDETOMIDINE HYDROCHLORIDE SCH MLS/HR: 4 INJECTION, SOLUTION INTRAVENOUS at 21:56

## 2022-09-04 RX ADMIN — Medication SCH MLS/HR: at 00:43

## 2022-09-04 RX ADMIN — HEPARIN SODIUM SCH UNIT: 5000 INJECTION, SOLUTION INTRAVENOUS; SUBCUTANEOUS at 13:51

## 2022-09-04 RX ADMIN — INSULIN DETEMIR SCH UNITS: 100 INJECTION, SOLUTION SUBCUTANEOUS at 06:07

## 2022-09-04 RX ADMIN — HEPARIN SODIUM SCH UNIT: 5000 INJECTION, SOLUTION INTRAVENOUS; SUBCUTANEOUS at 21:56

## 2022-09-04 RX ADMIN — PIPERACILLIN SODIUM AND TAZOBACTAM SODIUM SCH MLS/HR: .25; 2 INJECTION, POWDER, LYOPHILIZED, FOR SOLUTION INTRAVENOUS at 16:59

## 2022-09-04 RX ADMIN — INSULIN ASPART SCH UNITS: 100 INJECTION, SOLUTION INTRAVENOUS; SUBCUTANEOUS at 22:02

## 2022-09-04 RX ADMIN — INSULIN ASPART SCH: 100 INJECTION, SOLUTION INTRAVENOUS; SUBCUTANEOUS at 16:58

## 2022-09-04 RX ADMIN — DEXMEDETOMIDINE HYDROCHLORIDE SCH MLS/HR: 4 INJECTION, SOLUTION INTRAVENOUS at 03:29

## 2022-09-04 RX ADMIN — INSULIN DETEMIR SCH UNITS: 100 INJECTION, SOLUTION SUBCUTANEOUS at 21:58

## 2022-09-05 LAB
ALBUMIN SERPL-MCNC: 1.8 G/DL (ref 3.4–5)
ALBUMIN SERPL-MCNC: 2.1 G/DL (ref 3.4–5)
ALP SERPL-CCNC: 124 U/L (ref 45–117)
ALP SERPL-CCNC: 155 U/L (ref 45–117)
ALT SERPL-CCNC: 23 U/L (ref 13–61)
ALT SERPL-CCNC: 27 U/L (ref 13–61)
ANION GAP SERPL CALC-SCNC: 13 MMOL/L (ref 8–16)
ANION GAP SERPL CALC-SCNC: 14 MMOL/L (ref 8–16)
ANISOCYTOSIS BLD QL: (no result)
ANISOCYTOSIS BLD QL: (no result)
ARTERIAL PATENCY WRIST A: (no result)
ARTERIAL PATENCY WRIST A: (no result)
AST SERPL-CCNC: 28 U/L (ref 15–37)
AST SERPL-CCNC: 34 U/L (ref 15–37)
BASE EXCESS BLDA CALC-SCNC: -0.1 MMOL/L (ref -2–2)
BASE EXCESS BLDA CALC-SCNC: -4 MMOL/L (ref -2–2)
BASO STIPL BLD QL SMEAR: (no result)
BASO STIPL BLD QL SMEAR: (no result)
BILIRUB SERPL-MCNC: 0.3 MG/DL (ref 0.2–1)
BILIRUB SERPL-MCNC: 0.4 MG/DL (ref 0.2–1)
BREATHS.MECHANICAL ON VENT: 12
BREATHS.MECHANICAL ON VENT: 18
BUN SERPL-MCNC: 110 MG/DL (ref 7–18)
BUN SERPL-MCNC: 112.8 MG/DL (ref 7–18)
CALCIUM SERPL-MCNC: 7.5 MG/DL (ref 8.5–10.1)
CALCIUM SERPL-MCNC: 7.8 MG/DL (ref 8.5–10.1)
CHLORIDE SERPL-SCNC: 104 MMOL/L (ref 98–107)
CHLORIDE SERPL-SCNC: 99 MMOL/L (ref 98–107)
CO2 SERPL-SCNC: 25 MMOL/L (ref 21–32)
CO2 SERPL-SCNC: 25 MMOL/L (ref 21–32)
CREAT SERPL-MCNC: 5.1 MG/DL (ref 0.55–1.3)
CREAT SERPL-MCNC: 5.3 MG/DL (ref 0.55–1.3)
DEPRECATED RDW RBC AUTO: 14.5 % (ref 11.9–15.9)
DEPRECATED RDW RBC AUTO: 14.6 % (ref 11.9–15.9)
GLUCOSE SERPL-MCNC: 192 MG/DL (ref 74–106)
GLUCOSE SERPL-MCNC: 217 MG/DL (ref 74–106)
HCT VFR BLD CALC: 26.2 % (ref 35.4–49)
HCT VFR BLD CALC: 29.3 % (ref 35.4–49)
HCT VFR BLDV CALC: 29 % (ref 35.4–49)
HCT VFR BLDV CALC: 32 % (ref 35.4–49)
HGB BLD-MCNC: 8.6 GM/DL (ref 11.7–16.9)
HGB BLD-MCNC: 9.7 GM/DL (ref 11.7–16.9)
MACROCYTES BLD QL: (no result)
MACROCYTES BLD QL: 0
MAGNESIUM SERPL-MCNC: 2.5 MG/DL (ref 1.8–2.4)
MAGNESIUM SERPL-MCNC: 2.7 MG/DL (ref 1.8–2.4)
MCH RBC QN AUTO: 31.2 PG (ref 25.7–33.7)
MCH RBC QN AUTO: 31.2 PG (ref 25.7–33.7)
MCHC RBC AUTO-ENTMCNC: 32.8 G/DL (ref 32–35.9)
MCHC RBC AUTO-ENTMCNC: 33.1 G/DL (ref 32–35.9)
MCV RBC: 94.4 FL (ref 80–96)
MCV RBC: 95 FL (ref 80–96)
OVALOCYTES BLD QL SMEAR: (no result)
PCO2 BLDA: 37.7 MMHG (ref 35–45)
PCO2 BLDA: 41.8 MMHG (ref 35–45)
PHOSPHATE SERPL-MCNC: 7 MG/DL (ref 2.5–4.9)
PHOSPHATE SERPL-MCNC: 8 MG/DL (ref 2.5–4.9)
PLATELET # BLD AUTO: 533 10^3/UL (ref 134–434)
PLATELET # BLD AUTO: 703 10^3/UL (ref 134–434)
PMV BLD: 8.8 FL (ref 7.5–11.1)
PMV BLD: 8.9 FL (ref 7.5–11.1)
PO2 BLDA: 171.6 MMHG (ref 80–100)
PO2 BLDA: 87 MMHG (ref 80–100)
PROT SERPL-MCNC: 5.1 G/DL (ref 6.4–8.2)
PROT SERPL-MCNC: 6.1 G/DL (ref 6.4–8.2)
RBC # BLD AUTO: 2.76 M/MM3 (ref 4–5.6)
RBC # BLD AUTO: 3.1 M/MM3 (ref 4–5.6)
SAO2 % BLDA: 96.5 % (ref 95–98)
SAO2 % BLDA: 99.1 % (ref 95–98)
SODIUM SERPL-SCNC: 139 MMOL/L (ref 136–145)
SODIUM SERPL-SCNC: 141 MMOL/L (ref 136–145)
VENTILATION MODE VENT: (no result)
VENTILATION MODE VENT: (no result)
WBC # BLD AUTO: 16.7 K/MM3 (ref 4–10)
WBC # BLD AUTO: 24.3 K/MM3 (ref 4–10)

## 2022-09-05 RX ADMIN — HEPARIN SODIUM SCH UNIT: 5000 INJECTION, SOLUTION INTRAVENOUS; SUBCUTANEOUS at 21:48

## 2022-09-05 RX ADMIN — Medication SCH MLS/HR: at 00:50

## 2022-09-05 RX ADMIN — INSULIN ASPART SCH UNITS: 100 INJECTION, SOLUTION INTRAVENOUS; SUBCUTANEOUS at 21:55

## 2022-09-05 RX ADMIN — INSULIN ASPART SCH: 100 INJECTION, SOLUTION INTRAVENOUS; SUBCUTANEOUS at 17:07

## 2022-09-05 RX ADMIN — HEPARIN SODIUM SCH UNIT: 5000 INJECTION, SOLUTION INTRAVENOUS; SUBCUTANEOUS at 06:26

## 2022-09-05 RX ADMIN — INSULIN ASPART SCH: 100 INJECTION, SOLUTION INTRAVENOUS; SUBCUTANEOUS at 12:51

## 2022-09-05 RX ADMIN — PIPERACILLIN SODIUM AND TAZOBACTAM SODIUM SCH MLS/HR: .25; 2 INJECTION, POWDER, LYOPHILIZED, FOR SOLUTION INTRAVENOUS at 01:40

## 2022-09-05 RX ADMIN — LATANOPROST SCH DROP: 50 SOLUTION OPHTHALMIC at 21:49

## 2022-09-05 RX ADMIN — INSULIN DETEMIR SCH UNITS: 100 INJECTION, SOLUTION SUBCUTANEOUS at 06:26

## 2022-09-05 RX ADMIN — DEXMEDETOMIDINE HYDROCHLORIDE SCH MLS/HR: 4 INJECTION, SOLUTION INTRAVENOUS at 21:48

## 2022-09-05 RX ADMIN — PANTOPRAZOLE SODIUM SCH MG: 40 INJECTION, POWDER, FOR SOLUTION INTRAVENOUS at 10:49

## 2022-09-05 RX ADMIN — HYDROCORTISONE SODIUM SUCCINATE SCH MG: 100 INJECTION, POWDER, FOR SOLUTION INTRAMUSCULAR; INTRAVENOUS at 10:50

## 2022-09-05 RX ADMIN — PIPERACILLIN SODIUM AND TAZOBACTAM SODIUM SCH MLS/HR: .25; 2 INJECTION, POWDER, LYOPHILIZED, FOR SOLUTION INTRAVENOUS at 17:07

## 2022-09-05 RX ADMIN — INSULIN ASPART SCH UNITS: 100 INJECTION, SOLUTION INTRAVENOUS; SUBCUTANEOUS at 06:27

## 2022-09-05 RX ADMIN — HEPARIN SODIUM SCH UNIT: 5000 INJECTION, SOLUTION INTRAVENOUS; SUBCUTANEOUS at 14:07

## 2022-09-05 RX ADMIN — PIPERACILLIN SODIUM AND TAZOBACTAM SODIUM SCH MLS/HR: .25; 2 INJECTION, POWDER, LYOPHILIZED, FOR SOLUTION INTRAVENOUS at 10:50

## 2022-09-05 RX ADMIN — INSULIN DETEMIR SCH UNITS: 100 INJECTION, SOLUTION SUBCUTANEOUS at 21:55

## 2022-09-05 RX ADMIN — FENTANYL CITRATE SCH MLS/HR: 0.05 INJECTION, SOLUTION INTRAMUSCULAR; INTRAVENOUS at 07:00

## 2022-09-06 LAB
ALBUMIN SERPL-MCNC: 2.1 G/DL (ref 3.4–5)
ALP SERPL-CCNC: 116 U/L (ref 45–117)
ALT SERPL-CCNC: 23 U/L (ref 13–61)
ANION GAP SERPL CALC-SCNC: 10 MMOL/L (ref 8–16)
AST SERPL-CCNC: 32 U/L (ref 15–37)
BILIRUB SERPL-MCNC: 0.4 MG/DL (ref 0.2–1)
BUN SERPL-MCNC: 65.3 MG/DL (ref 7–18)
CALCIUM SERPL-MCNC: 7.7 MG/DL (ref 8.5–10.1)
CHLORIDE SERPL-SCNC: 103 MMOL/L (ref 98–107)
CO2 SERPL-SCNC: 29 MMOL/L (ref 21–32)
CREAT SERPL-MCNC: 3.5 MG/DL (ref 0.55–1.3)
DEPRECATED RDW RBC AUTO: 14.8 % (ref 11.9–15.9)
GLUCOSE SERPL-MCNC: 125 MG/DL (ref 74–106)
HCT VFR BLD CALC: 26.3 % (ref 35.4–49)
HGB BLD-MCNC: 8.4 GM/DL (ref 11.7–16.9)
MAGNESIUM SERPL-MCNC: 2.1 MG/DL (ref 1.8–2.4)
MCH RBC QN AUTO: 30.4 PG (ref 25.7–33.7)
MCHC RBC AUTO-ENTMCNC: 32 G/DL (ref 32–35.9)
MCV RBC: 95 FL (ref 80–96)
PHOSPHATE SERPL-MCNC: 4.6 MG/DL (ref 2.5–4.9)
PLATELET # BLD AUTO: 648 10^3/UL (ref 134–434)
PMV BLD: 9 FL (ref 7.5–11.1)
PROT SERPL-MCNC: 5.5 G/DL (ref 6.4–8.2)
RBC # BLD AUTO: 2.77 M/MM3 (ref 4–5.6)
SODIUM SERPL-SCNC: 142 MMOL/L (ref 136–145)
WBC # BLD AUTO: 20 K/MM3 (ref 4–10)

## 2022-09-06 RX ADMIN — DEXMEDETOMIDINE HYDROCHLORIDE SCH MLS/HR: 4 INJECTION, SOLUTION INTRAVENOUS at 16:51

## 2022-09-06 RX ADMIN — HEPARIN SODIUM SCH UNIT: 5000 INJECTION, SOLUTION INTRAVENOUS; SUBCUTANEOUS at 05:16

## 2022-09-06 RX ADMIN — INSULIN ASPART SCH: 100 INJECTION, SOLUTION INTRAVENOUS; SUBCUTANEOUS at 16:09

## 2022-09-06 RX ADMIN — LATANOPROST SCH DROP: 50 SOLUTION OPHTHALMIC at 21:45

## 2022-09-06 RX ADMIN — PROPOFOL SCH MLS/HR: 10 INJECTION, EMULSION INTRAVENOUS at 10:52

## 2022-09-06 RX ADMIN — INSULIN DETEMIR SCH UNITS: 100 INJECTION, SOLUTION SUBCUTANEOUS at 06:45

## 2022-09-06 RX ADMIN — INSULIN ASPART SCH: 100 INJECTION, SOLUTION INTRAVENOUS; SUBCUTANEOUS at 11:55

## 2022-09-06 RX ADMIN — INSULIN ASPART SCH UNITS: 100 INJECTION, SOLUTION INTRAVENOUS; SUBCUTANEOUS at 21:49

## 2022-09-06 RX ADMIN — HEPARIN SODIUM SCH UNIT: 5000 INJECTION, SOLUTION INTRAVENOUS; SUBCUTANEOUS at 13:39

## 2022-09-06 RX ADMIN — INSULIN DETEMIR SCH UNITS: 100 INJECTION, SOLUTION SUBCUTANEOUS at 21:44

## 2022-09-06 RX ADMIN — HEPARIN SODIUM SCH UNIT: 5000 INJECTION, SOLUTION INTRAVENOUS; SUBCUTANEOUS at 21:44

## 2022-09-06 RX ADMIN — PIPERACILLIN SODIUM AND TAZOBACTAM SODIUM SCH MLS/HR: .25; 2 INJECTION, POWDER, LYOPHILIZED, FOR SOLUTION INTRAVENOUS at 01:29

## 2022-09-06 RX ADMIN — PANTOPRAZOLE SODIUM SCH MG: 40 INJECTION, POWDER, FOR SOLUTION INTRAVENOUS at 09:49

## 2022-09-06 RX ADMIN — PIPERACILLIN SODIUM AND TAZOBACTAM SODIUM SCH MLS/HR: .25; 2 INJECTION, POWDER, LYOPHILIZED, FOR SOLUTION INTRAVENOUS at 09:49

## 2022-09-06 RX ADMIN — HYDROCORTISONE SODIUM SUCCINATE SCH MG: 100 INJECTION, POWDER, FOR SOLUTION INTRAMUSCULAR; INTRAVENOUS at 09:49

## 2022-09-06 RX ADMIN — INSULIN ASPART SCH: 100 INJECTION, SOLUTION INTRAVENOUS; SUBCUTANEOUS at 06:45

## 2022-09-07 LAB
ALBUMIN SERPL-MCNC: 1.9 G/DL (ref 3.4–5)
ALP SERPL-CCNC: 99 U/L (ref 45–117)
ALT SERPL-CCNC: 21 U/L (ref 13–61)
ANION GAP SERPL CALC-SCNC: 10 MMOL/L (ref 8–16)
AST SERPL-CCNC: 29 U/L (ref 15–37)
BILIRUB SERPL-MCNC: 0.4 MG/DL (ref 0.2–1)
BUN SERPL-MCNC: 71.6 MG/DL (ref 7–18)
CALCIUM SERPL-MCNC: 7.5 MG/DL (ref 8.5–10.1)
CHLORIDE SERPL-SCNC: 106 MMOL/L (ref 98–107)
CO2 SERPL-SCNC: 27 MMOL/L (ref 21–32)
CREAT SERPL-MCNC: 4.1 MG/DL (ref 0.55–1.3)
DEPRECATED RDW RBC AUTO: 14.9 % (ref 11.9–15.9)
GLUCOSE SERPL-MCNC: 140 MG/DL (ref 74–106)
HCT VFR BLD CALC: 23.4 % (ref 35.4–49)
HGB BLD-MCNC: 7.6 GM/DL (ref 11.7–16.9)
MAGNESIUM SERPL-MCNC: 2.1 MG/DL (ref 1.8–2.4)
MCH RBC QN AUTO: 31.2 PG (ref 25.7–33.7)
MCHC RBC AUTO-ENTMCNC: 32.7 G/DL (ref 32–35.9)
MCV RBC: 95.4 FL (ref 80–96)
PHOSPHATE SERPL-MCNC: 5.8 MG/DL (ref 2.5–4.9)
PLATELET # BLD AUTO: 575 10^3/UL (ref 134–434)
PMV BLD: 9 FL (ref 7.5–11.1)
PROT SERPL-MCNC: 5.1 G/DL (ref 6.4–8.2)
RBC # BLD AUTO: 2.45 M/MM3 (ref 4–5.6)
SODIUM SERPL-SCNC: 144 MMOL/L (ref 136–145)
WBC # BLD AUTO: 15.2 K/MM3 (ref 4–10)

## 2022-09-07 RX ADMIN — LATANOPROST SCH DROP: 50 SOLUTION OPHTHALMIC at 21:36

## 2022-09-07 RX ADMIN — PANTOPRAZOLE SODIUM SCH MG: 40 INJECTION, POWDER, FOR SOLUTION INTRAVENOUS at 09:37

## 2022-09-07 RX ADMIN — INSULIN ASPART SCH: 100 INJECTION, SOLUTION INTRAVENOUS; SUBCUTANEOUS at 21:41

## 2022-09-07 RX ADMIN — HEPARIN SODIUM SCH UNIT: 5000 INJECTION, SOLUTION INTRAVENOUS; SUBCUTANEOUS at 05:57

## 2022-09-07 RX ADMIN — HYDROCORTISONE SODIUM SUCCINATE SCH MG: 100 INJECTION, POWDER, FOR SOLUTION INTRAMUSCULAR; INTRAVENOUS at 09:37

## 2022-09-07 RX ADMIN — ALBUMIN (HUMAN) SCH: 0.25 INJECTION, SOLUTION INTRAVENOUS at 17:30

## 2022-09-07 RX ADMIN — ALBUMIN (HUMAN) SCH: 0.25 INJECTION, SOLUTION INTRAVENOUS at 17:32

## 2022-09-07 RX ADMIN — INSULIN ASPART SCH: 100 INJECTION, SOLUTION INTRAVENOUS; SUBCUTANEOUS at 10:35

## 2022-09-07 RX ADMIN — INSULIN ASPART SCH UNITS: 100 INJECTION, SOLUTION INTRAVENOUS; SUBCUTANEOUS at 06:26

## 2022-09-07 RX ADMIN — DEXMEDETOMIDINE HYDROCHLORIDE SCH MLS/HR: 4 INJECTION, SOLUTION INTRAVENOUS at 01:48

## 2022-09-07 RX ADMIN — INSULIN DETEMIR SCH UNITS: 100 INJECTION, SOLUTION SUBCUTANEOUS at 21:41

## 2022-09-07 RX ADMIN — DEXMEDETOMIDINE HYDROCHLORIDE SCH MLS/HR: 4 INJECTION, SOLUTION INTRAVENOUS at 21:42

## 2022-09-07 RX ADMIN — POTASSIUM CHLORIDE SCH MLS/HR: 7.46 INJECTION, SOLUTION INTRAVENOUS at 09:36

## 2022-09-07 RX ADMIN — DEXMEDETOMIDINE HYDROCHLORIDE SCH MLS/HR: 4 INJECTION, SOLUTION INTRAVENOUS at 15:31

## 2022-09-07 RX ADMIN — INSULIN ASPART SCH: 100 INJECTION, SOLUTION INTRAVENOUS; SUBCUTANEOUS at 15:57

## 2022-09-07 RX ADMIN — PROPOFOL SCH: 10 INJECTION, EMULSION INTRAVENOUS at 10:35

## 2022-09-07 RX ADMIN — POTASSIUM CHLORIDE SCH MLS/HR: 7.46 INJECTION, SOLUTION INTRAVENOUS at 10:36

## 2022-09-07 RX ADMIN — ALBUMIN (HUMAN) SCH: 0.25 INJECTION, SOLUTION INTRAVENOUS at 17:31

## 2022-09-07 RX ADMIN — INSULIN DETEMIR SCH UNITS: 100 INJECTION, SOLUTION SUBCUTANEOUS at 06:26

## 2022-09-07 RX ADMIN — HEPARIN SODIUM SCH UNIT: 5000 INJECTION, SOLUTION INTRAVENOUS; SUBCUTANEOUS at 21:36

## 2022-09-07 RX ADMIN — HEPARIN SODIUM SCH UNIT: 5000 INJECTION, SOLUTION INTRAVENOUS; SUBCUTANEOUS at 13:40

## 2022-09-08 LAB
ALBUMIN SERPL-MCNC: 2 G/DL (ref 3.4–5)
ALP SERPL-CCNC: 91 U/L (ref 45–117)
ALT SERPL-CCNC: 20 U/L (ref 13–61)
ANION GAP SERPL CALC-SCNC: 7 MMOL/L (ref 8–16)
AST SERPL-CCNC: 25 U/L (ref 15–37)
BILIRUB SERPL-MCNC: 0.3 MG/DL (ref 0.2–1)
BUN SERPL-MCNC: 38.7 MG/DL (ref 7–18)
CALCIUM SERPL-MCNC: 7.9 MG/DL (ref 8.5–10.1)
CHLORIDE SERPL-SCNC: 108 MMOL/L (ref 98–107)
CO2 SERPL-SCNC: 32 MMOL/L (ref 21–32)
CREAT SERPL-MCNC: 2.8 MG/DL (ref 0.55–1.3)
DEPRECATED RDW RBC AUTO: 14.9 % (ref 11.9–15.9)
GLUCOSE SERPL-MCNC: 81 MG/DL (ref 74–106)
HCT VFR BLD CALC: 24.3 % (ref 35.4–49)
HGB BLD-MCNC: 8 GM/DL (ref 11.7–16.9)
MAGNESIUM SERPL-MCNC: 1.9 MG/DL (ref 1.8–2.4)
MCH RBC QN AUTO: 31.3 PG (ref 25.7–33.7)
MCHC RBC AUTO-ENTMCNC: 32.9 G/DL (ref 32–35.9)
MCV RBC: 95 FL (ref 80–96)
PHOSPHATE SERPL-MCNC: 4.5 MG/DL (ref 2.5–4.9)
PLATELET # BLD AUTO: 561 10^3/UL (ref 134–434)
PMV BLD: 8.9 FL (ref 7.5–11.1)
PROT SERPL-MCNC: 5.5 G/DL (ref 6.4–8.2)
RBC # BLD AUTO: 2.56 M/MM3 (ref 4–5.6)
SODIUM SERPL-SCNC: 147 MMOL/L (ref 136–145)
WBC # BLD AUTO: 13.5 K/MM3 (ref 4–10)

## 2022-09-08 RX ADMIN — DEXMEDETOMIDINE HYDROCHLORIDE SCH MLS/HR: 4 INJECTION, SOLUTION INTRAVENOUS at 21:25

## 2022-09-08 RX ADMIN — PANTOPRAZOLE SODIUM SCH MG: 40 INJECTION, POWDER, FOR SOLUTION INTRAVENOUS at 09:08

## 2022-09-08 RX ADMIN — INSULIN DETEMIR SCH: 100 INJECTION, SOLUTION SUBCUTANEOUS at 06:19

## 2022-09-08 RX ADMIN — HEPARIN SODIUM SCH UNIT: 5000 INJECTION, SOLUTION INTRAVENOUS; SUBCUTANEOUS at 21:25

## 2022-09-08 RX ADMIN — Medication SCH ML: at 09:07

## 2022-09-08 RX ADMIN — LATANOPROST SCH DROP: 50 SOLUTION OPHTHALMIC at 21:25

## 2022-09-08 RX ADMIN — PROPOFOL SCH MLS/HR: 10 INJECTION, EMULSION INTRAVENOUS at 14:50

## 2022-09-08 RX ADMIN — HYDROCORTISONE SODIUM SUCCINATE SCH MG: 100 INJECTION, POWDER, FOR SOLUTION INTRAMUSCULAR; INTRAVENOUS at 09:08

## 2022-09-08 RX ADMIN — HEPARIN SODIUM SCH UNIT: 5000 INJECTION, SOLUTION INTRAVENOUS; SUBCUTANEOUS at 06:19

## 2022-09-08 RX ADMIN — INSULIN DETEMIR SCH UNITS: 100 INJECTION, SOLUTION SUBCUTANEOUS at 21:10

## 2022-09-08 RX ADMIN — PROPOFOL SCH MLS/HR: 10 INJECTION, EMULSION INTRAVENOUS at 09:08

## 2022-09-08 RX ADMIN — INSULIN ASPART SCH: 100 INJECTION, SOLUTION INTRAVENOUS; SUBCUTANEOUS at 06:19

## 2022-09-08 RX ADMIN — INSULIN ASPART SCH: 100 INJECTION, SOLUTION INTRAVENOUS; SUBCUTANEOUS at 21:10

## 2022-09-08 RX ADMIN — Medication SCH TABLET: at 09:08

## 2022-09-08 RX ADMIN — INSULIN ASPART SCH: 100 INJECTION, SOLUTION INTRAVENOUS; SUBCUTANEOUS at 11:20

## 2022-09-08 RX ADMIN — HEPARIN SODIUM SCH UNIT: 5000 INJECTION, SOLUTION INTRAVENOUS; SUBCUTANEOUS at 14:50

## 2022-09-08 RX ADMIN — INSULIN ASPART SCH: 100 INJECTION, SOLUTION INTRAVENOUS; SUBCUTANEOUS at 16:28

## 2022-09-08 RX ADMIN — DEXMEDETOMIDINE HYDROCHLORIDE SCH MLS/HR: 4 INJECTION, SOLUTION INTRAVENOUS at 17:37

## 2022-09-09 LAB
ALBUMIN SERPL-MCNC: 2.2 G/DL (ref 3.4–5)
ALP SERPL-CCNC: 97 U/L (ref 45–117)
ALT SERPL-CCNC: 20 U/L (ref 13–61)
ANION GAP SERPL CALC-SCNC: 8 MMOL/L (ref 8–16)
AST SERPL-CCNC: 21 U/L (ref 15–37)
BILIRUB SERPL-MCNC: 0.4 MG/DL (ref 0.2–1)
BUN SERPL-MCNC: 49.1 MG/DL (ref 7–18)
CALCIUM SERPL-MCNC: 7.9 MG/DL (ref 8.5–10.1)
CHLORIDE SERPL-SCNC: 105 MMOL/L (ref 98–107)
CO2 SERPL-SCNC: 29 MMOL/L (ref 21–32)
CREAT SERPL-MCNC: 3.3 MG/DL (ref 0.55–1.3)
DEPRECATED RDW RBC AUTO: 15.2 % (ref 11.9–15.9)
GLUCOSE SERPL-MCNC: 217 MG/DL (ref 74–106)
HCT VFR BLD CALC: 27.1 % (ref 35.4–49)
HGB BLD-MCNC: 8.6 GM/DL (ref 11.7–16.9)
MAGNESIUM SERPL-MCNC: 2 MG/DL (ref 1.8–2.4)
MCH RBC QN AUTO: 30.2 PG (ref 25.7–33.7)
MCHC RBC AUTO-ENTMCNC: 31.5 G/DL (ref 32–35.9)
MCV RBC: 95.8 FL (ref 80–96)
PHOSPHATE SERPL-MCNC: 5.8 MG/DL (ref 2.5–4.9)
PLATELET # BLD AUTO: 573 10^3/UL (ref 134–434)
PMV BLD: 9.2 FL (ref 7.5–11.1)
PROT SERPL-MCNC: 5.9 G/DL (ref 6.4–8.2)
RBC # BLD AUTO: 2.83 M/MM3 (ref 4–5.6)
SODIUM SERPL-SCNC: 143 MMOL/L (ref 136–145)
WBC # BLD AUTO: 11.5 K/MM3 (ref 4–10)

## 2022-09-09 RX ADMIN — Medication SCH ML: at 08:36

## 2022-09-09 RX ADMIN — HYDROCORTISONE SODIUM SUCCINATE SCH MG: 100 INJECTION, POWDER, FOR SOLUTION INTRAMUSCULAR; INTRAVENOUS at 10:05

## 2022-09-09 RX ADMIN — PANTOPRAZOLE SODIUM SCH MG: 40 INJECTION, POWDER, FOR SOLUTION INTRAVENOUS at 10:05

## 2022-09-09 RX ADMIN — LEUCINE, PHENYLALANINE, LYSINE, METHIONINE, ISOLEUCINE, VALINE, HISTIDINE, THREONINE, TRYPTOPHAN, ALANINE, GLYCINE, ARGININE, PROLINE, SERINE, TYROSINE, DEXTROSE SCH MLS/HR: 311; 238; 247; 170; 255; 247; 204; 179; 77; 880; 438; 489; 289; 213; 17; 5 INJECTION INTRAVENOUS at 18:40

## 2022-09-09 RX ADMIN — LATANOPROST SCH DROP: 50 SOLUTION OPHTHALMIC at 21:06

## 2022-09-09 RX ADMIN — HEPARIN SODIUM SCH UNIT: 5000 INJECTION, SOLUTION INTRAVENOUS; SUBCUTANEOUS at 21:05

## 2022-09-09 RX ADMIN — INSULIN ASPART SCH UNITS: 100 INJECTION, SOLUTION INTRAVENOUS; SUBCUTANEOUS at 10:27

## 2022-09-09 RX ADMIN — INSULIN ASPART SCH UNITS: 100 INJECTION, SOLUTION INTRAVENOUS; SUBCUTANEOUS at 16:55

## 2022-09-09 RX ADMIN — Medication SCH TABLET: at 10:04

## 2022-09-09 RX ADMIN — INSULIN ASPART SCH: 100 INJECTION, SOLUTION INTRAVENOUS; SUBCUTANEOUS at 22:32

## 2022-09-09 RX ADMIN — FENTANYL CITRATE SCH MLS/HR: 0.05 INJECTION, SOLUTION INTRAMUSCULAR; INTRAVENOUS at 23:50

## 2022-09-09 RX ADMIN — INSULIN ASPART SCH UNITS: 100 INJECTION, SOLUTION INTRAVENOUS; SUBCUTANEOUS at 06:18

## 2022-09-09 RX ADMIN — HEPARIN SODIUM SCH UNIT: 5000 INJECTION, SOLUTION INTRAVENOUS; SUBCUTANEOUS at 05:52

## 2022-09-09 RX ADMIN — PROPOFOL SCH: 10 INJECTION, EMULSION INTRAVENOUS at 11:13

## 2022-09-09 RX ADMIN — INSULIN DETEMIR SCH UNITS: 100 INJECTION, SOLUTION SUBCUTANEOUS at 06:18

## 2022-09-09 RX ADMIN — HEPARIN SODIUM SCH UNIT: 5000 INJECTION, SOLUTION INTRAVENOUS; SUBCUTANEOUS at 13:59

## 2022-09-09 RX ADMIN — INSULIN DETEMIR SCH: 100 INJECTION, SOLUTION SUBCUTANEOUS at 22:32

## 2022-09-10 LAB
ALBUMIN SERPL-MCNC: 2 G/DL (ref 3.4–5)
ALP SERPL-CCNC: 89 U/L (ref 45–117)
ALT SERPL-CCNC: 17 U/L (ref 13–61)
ANION GAP SERPL CALC-SCNC: 9 MMOL/L (ref 8–16)
AST SERPL-CCNC: 20 U/L (ref 15–37)
BILIRUB SERPL-MCNC: 0.4 MG/DL (ref 0.2–1)
BUN SERPL-MCNC: 58.5 MG/DL (ref 7–18)
CALCIUM SERPL-MCNC: 7.4 MG/DL (ref 8.5–10.1)
CHLORIDE SERPL-SCNC: 105 MMOL/L (ref 98–107)
CO2 SERPL-SCNC: 28 MMOL/L (ref 21–32)
CREAT SERPL-MCNC: 3.6 MG/DL (ref 0.55–1.3)
DEPRECATED RDW RBC AUTO: 15.3 % (ref 11.9–15.9)
GLUCOSE SERPL-MCNC: 207 MG/DL (ref 74–106)
HCT VFR BLD CALC: 22.9 % (ref 35.4–49)
HGB BLD-MCNC: 7.4 GM/DL (ref 11.7–16.9)
MAGNESIUM SERPL-MCNC: 1.9 MG/DL (ref 1.8–2.4)
MCH RBC QN AUTO: 30.9 PG (ref 25.7–33.7)
MCHC RBC AUTO-ENTMCNC: 32.2 G/DL (ref 32–35.9)
MCV RBC: 95.9 FL (ref 80–96)
PHOSPHATE SERPL-MCNC: 5.9 MG/DL (ref 2.5–4.9)
PLATELET # BLD AUTO: 509 10^3/UL (ref 134–434)
PMV BLD: 9 FL (ref 7.5–11.1)
PROT SERPL-MCNC: 5.5 G/DL (ref 6.4–8.2)
RBC # BLD AUTO: 2.38 M/MM3 (ref 4–5.6)
SODIUM SERPL-SCNC: 142 MMOL/L (ref 136–145)
WBC # BLD AUTO: 9.9 K/MM3 (ref 4–10)

## 2022-09-10 RX ADMIN — HEPARIN SODIUM SCH UNIT: 5000 INJECTION, SOLUTION INTRAVENOUS; SUBCUTANEOUS at 21:28

## 2022-09-10 RX ADMIN — PROPOFOL SCH: 10 INJECTION, EMULSION INTRAVENOUS at 11:14

## 2022-09-10 RX ADMIN — INSULIN ASPART SCH UNITS: 100 INJECTION, SOLUTION INTRAVENOUS; SUBCUTANEOUS at 16:18

## 2022-09-10 RX ADMIN — FUROSEMIDE SCH MG: 10 INJECTION, SOLUTION INTRAVENOUS at 10:33

## 2022-09-10 RX ADMIN — HEPARIN SODIUM SCH UNIT: 5000 INJECTION, SOLUTION INTRAVENOUS; SUBCUTANEOUS at 15:24

## 2022-09-10 RX ADMIN — PANTOPRAZOLE SODIUM SCH MG: 40 INJECTION, POWDER, FOR SOLUTION INTRAVENOUS at 10:34

## 2022-09-10 RX ADMIN — INSULIN DETEMIR SCH: 100 INJECTION, SOLUTION SUBCUTANEOUS at 07:00

## 2022-09-10 RX ADMIN — LEUCINE, PHENYLALANINE, LYSINE, METHIONINE, ISOLEUCINE, VALINE, HISTIDINE, THREONINE, TRYPTOPHAN, ALANINE, GLYCINE, ARGININE, PROLINE, SERINE, TYROSINE, DEXTROSE SCH MLS/HR: 311; 238; 247; 170; 255; 247; 204; 179; 77; 880; 438; 489; 289; 213; 17; 5 INJECTION INTRAVENOUS at 17:44

## 2022-09-10 RX ADMIN — INSULIN DETEMIR SCH UNITS: 100 INJECTION, SOLUTION SUBCUTANEOUS at 21:29

## 2022-09-10 RX ADMIN — HEPARIN SODIUM SCH UNIT: 5000 INJECTION, SOLUTION INTRAVENOUS; SUBCUTANEOUS at 07:00

## 2022-09-10 RX ADMIN — FENTANYL CITRATE SCH MLS/HR: 0.05 INJECTION, SOLUTION INTRAMUSCULAR; INTRAVENOUS at 10:34

## 2022-09-10 RX ADMIN — LATANOPROST SCH DROP: 50 SOLUTION OPHTHALMIC at 21:29

## 2022-09-10 RX ADMIN — INSULIN ASPART SCH: 100 INJECTION, SOLUTION INTRAVENOUS; SUBCUTANEOUS at 07:01

## 2022-09-10 RX ADMIN — PROPOFOL SCH MLS/HR: 10 INJECTION, EMULSION INTRAVENOUS at 10:34

## 2022-09-10 RX ADMIN — INSULIN ASPART SCH UNITS: 100 INJECTION, SOLUTION INTRAVENOUS; SUBCUTANEOUS at 21:30

## 2022-09-10 RX ADMIN — Medication SCH: at 10:34

## 2022-09-10 RX ADMIN — INSULIN ASPART SCH UNITS: 100 INJECTION, SOLUTION INTRAVENOUS; SUBCUTANEOUS at 11:14

## 2022-09-11 LAB
ALBUMIN SERPL-MCNC: 2.1 G/DL (ref 3.4–5)
ALP SERPL-CCNC: 92 U/L (ref 45–117)
ALT SERPL-CCNC: 17 U/L (ref 13–61)
ANION GAP SERPL CALC-SCNC: 11 MMOL/L (ref 8–16)
AST SERPL-CCNC: 14 U/L (ref 15–37)
BASOPHILS # BLD: 1.1 % (ref 0–2)
BILIRUB SERPL-MCNC: 0.4 MG/DL (ref 0.2–1)
BUN SERPL-MCNC: 69.4 MG/DL (ref 7–18)
CALCIUM SERPL-MCNC: 7.7 MG/DL (ref 8.5–10.1)
CHLORIDE SERPL-SCNC: 102 MMOL/L (ref 98–107)
CO2 SERPL-SCNC: 27 MMOL/L (ref 21–32)
CREAT SERPL-MCNC: 3.9 MG/DL (ref 0.55–1.3)
DEPRECATED RDW RBC AUTO: 15.6 % (ref 11.9–15.9)
EOSINOPHIL # BLD: 4.7 % (ref 0–4.5)
GLUCOSE SERPL-MCNC: 234 MG/DL (ref 74–106)
HCT VFR BLD CALC: 24.2 % (ref 35.4–49)
HGB BLD-MCNC: 7.8 GM/DL (ref 11.7–16.9)
LYMPHOCYTES # BLD: 9.4 % (ref 8–40)
MAGNESIUM SERPL-MCNC: 1.8 MG/DL (ref 1.8–2.4)
MCH RBC QN AUTO: 31.1 PG (ref 25.7–33.7)
MCHC RBC AUTO-ENTMCNC: 32.2 G/DL (ref 32–35.9)
MCV RBC: 96.7 FL (ref 80–96)
MONOCYTES # BLD AUTO: 8.6 % (ref 3.8–10.2)
NEUTROPHILS # BLD: 76.2 % (ref 42.8–82.8)
PHOSPHATE SERPL-MCNC: 6.8 MG/DL (ref 2.5–4.9)
PLATELET # BLD AUTO: 528 10^3/UL (ref 134–434)
PMV BLD: 8.8 FL (ref 7.5–11.1)
PROT SERPL-MCNC: 5.9 G/DL (ref 6.4–8.2)
RBC # BLD AUTO: 2.5 M/MM3 (ref 4–5.6)
SODIUM SERPL-SCNC: 140 MMOL/L (ref 136–145)
WBC # BLD AUTO: 10 K/MM3 (ref 4–10)

## 2022-09-11 RX ADMIN — FENTANYL CITRATE SCH MLS/HR: 0.05 INJECTION, SOLUTION INTRAMUSCULAR; INTRAVENOUS at 06:33

## 2022-09-11 RX ADMIN — INSULIN DETEMIR SCH UNITS: 100 INJECTION, SOLUTION SUBCUTANEOUS at 06:35

## 2022-09-11 RX ADMIN — HEPARIN SODIUM SCH UNIT: 5000 INJECTION, SOLUTION INTRAVENOUS; SUBCUTANEOUS at 13:41

## 2022-09-11 RX ADMIN — HEPARIN SODIUM SCH UNIT: 5000 INJECTION, SOLUTION INTRAVENOUS; SUBCUTANEOUS at 21:31

## 2022-09-11 RX ADMIN — FUROSEMIDE SCH MG: 10 INJECTION, SOLUTION INTRAVENOUS at 10:07

## 2022-09-11 RX ADMIN — INSULIN ASPART SCH UNITS: 100 INJECTION, SOLUTION INTRAVENOUS; SUBCUTANEOUS at 06:35

## 2022-09-11 RX ADMIN — INSULIN ASPART SCH: 100 INJECTION, SOLUTION INTRAVENOUS; SUBCUTANEOUS at 17:05

## 2022-09-11 RX ADMIN — INSULIN DETEMIR SCH: 100 INJECTION, SOLUTION SUBCUTANEOUS at 21:39

## 2022-09-11 RX ADMIN — FENTANYL CITRATE SCH MLS/HR: 0.05 INJECTION, SOLUTION INTRAMUSCULAR; INTRAVENOUS at 13:45

## 2022-09-11 RX ADMIN — LATANOPROST SCH DROP: 50 SOLUTION OPHTHALMIC at 22:28

## 2022-09-11 RX ADMIN — FENTANYL CITRATE SCH MLS/HR: 0.05 INJECTION, SOLUTION INTRAMUSCULAR; INTRAVENOUS at 10:08

## 2022-09-11 RX ADMIN — PROPOFOL SCH: 10 INJECTION, EMULSION INTRAVENOUS at 14:00

## 2022-09-11 RX ADMIN — HEPARIN SODIUM SCH UNIT: 5000 INJECTION, SOLUTION INTRAVENOUS; SUBCUTANEOUS at 06:34

## 2022-09-11 RX ADMIN — Medication SCH: at 10:08

## 2022-09-11 RX ADMIN — PANTOPRAZOLE SODIUM SCH MG: 40 INJECTION, POWDER, FOR SOLUTION INTRAVENOUS at 10:08

## 2022-09-11 RX ADMIN — PROPOFOL SCH MLS/HR: 10 INJECTION, EMULSION INTRAVENOUS at 10:08

## 2022-09-11 RX ADMIN — PROPOFOL SCH MLS/HR: 10 INJECTION, EMULSION INTRAVENOUS at 13:45

## 2022-09-11 RX ADMIN — INSULIN ASPART SCH: 100 INJECTION, SOLUTION INTRAVENOUS; SUBCUTANEOUS at 21:31

## 2022-09-11 RX ADMIN — LEUCINE, PHENYLALANINE, LYSINE, METHIONINE, ISOLEUCINE, VALINE, HISTIDINE, THREONINE, TRYPTOPHAN, ALANINE, GLYCINE, ARGININE, PROLINE, SERINE, TYROSINE, DEXTROSE SCH MLS/HR: 311; 238; 247; 170; 255; 247; 204; 179; 77; 880; 438; 489; 289; 213; 17; 5 INJECTION INTRAVENOUS at 17:05

## 2022-09-11 RX ADMIN — FENTANYL CITRATE SCH MLS/HR: 0.05 INJECTION, SOLUTION INTRAMUSCULAR; INTRAVENOUS at 18:22

## 2022-09-11 RX ADMIN — INSULIN ASPART SCH UNITS: 100 INJECTION, SOLUTION INTRAVENOUS; SUBCUTANEOUS at 10:42

## 2022-09-12 LAB
ANION GAP SERPL CALC-SCNC: 12 MMOL/L (ref 8–16)
BUN SERPL-MCNC: 76.6 MG/DL (ref 7–18)
CALCIUM SERPL-MCNC: 7.7 MG/DL (ref 8.5–10.1)
CHLORIDE SERPL-SCNC: 101 MMOL/L (ref 98–107)
CO2 SERPL-SCNC: 25 MMOL/L (ref 21–32)
CREAT SERPL-MCNC: 4.1 MG/DL (ref 0.55–1.3)
DEPRECATED RDW RBC AUTO: 15.7 % (ref 11.9–15.9)
GLUCOSE SERPL-MCNC: 210 MG/DL (ref 74–106)
HCT VFR BLD CALC: 26.2 % (ref 35.4–49)
HGB BLD-MCNC: 8.4 GM/DL (ref 11.7–16.9)
MAGNESIUM SERPL-MCNC: 1.6 MG/DL (ref 1.8–2.4)
MCH RBC QN AUTO: 31.2 PG (ref 25.7–33.7)
MCHC RBC AUTO-ENTMCNC: 32.3 G/DL (ref 32–35.9)
MCV RBC: 96.7 FL (ref 80–96)
PHOSPHATE SERPL-MCNC: 6.7 MG/DL (ref 2.5–4.9)
PLATELET # BLD AUTO: 489 10^3/UL (ref 134–434)
PMV BLD: 8.8 FL (ref 7.5–11.1)
RBC # BLD AUTO: 2.71 M/MM3 (ref 4–5.6)
SODIUM SERPL-SCNC: 138 MMOL/L (ref 136–145)
WBC # BLD AUTO: 7.5 K/MM3 (ref 4–10)

## 2022-09-12 RX ADMIN — POLYETHYLENE GLYCOL 3350 SCH GM: 17 POWDER, FOR SOLUTION ORAL at 10:07

## 2022-09-12 RX ADMIN — INSULIN ASPART SCH UNITS: 100 INJECTION, SOLUTION INTRAVENOUS; SUBCUTANEOUS at 07:13

## 2022-09-12 RX ADMIN — INSULIN DETEMIR SCH UNITS: 100 INJECTION, SOLUTION SUBCUTANEOUS at 07:13

## 2022-09-12 RX ADMIN — FENTANYL CITRATE SCH MLS/HR: 0.05 INJECTION, SOLUTION INTRAMUSCULAR; INTRAVENOUS at 05:24

## 2022-09-12 RX ADMIN — Medication SCH TABLET: at 10:07

## 2022-09-12 RX ADMIN — FUROSEMIDE SCH MG: 10 INJECTION, SOLUTION INTRAVENOUS at 14:02

## 2022-09-12 RX ADMIN — INSULIN ASPART SCH UNITS: 100 INJECTION, SOLUTION INTRAVENOUS; SUBCUTANEOUS at 17:49

## 2022-09-12 RX ADMIN — HEPARIN SODIUM SCH UNIT: 5000 INJECTION, SOLUTION INTRAVENOUS; SUBCUTANEOUS at 05:24

## 2022-09-12 RX ADMIN — INSULIN ASPART SCH UNITS: 100 INJECTION, SOLUTION INTRAVENOUS; SUBCUTANEOUS at 11:14

## 2022-09-12 RX ADMIN — HEPARIN SODIUM SCH UNIT: 5000 INJECTION, SOLUTION INTRAVENOUS; SUBCUTANEOUS at 22:26

## 2022-09-12 RX ADMIN — FENTANYL CITRATE SCH MLS/HR: 0.05 INJECTION, SOLUTION INTRAMUSCULAR; INTRAVENOUS at 18:32

## 2022-09-12 RX ADMIN — INSULIN ASPART SCH UNITS: 100 INJECTION, SOLUTION INTRAVENOUS; SUBCUTANEOUS at 22:27

## 2022-09-12 RX ADMIN — HEPARIN SODIUM SCH UNIT: 5000 INJECTION, SOLUTION INTRAVENOUS; SUBCUTANEOUS at 14:02

## 2022-09-12 RX ADMIN — FENTANYL CITRATE SCH MLS/HR: 0.05 INJECTION, SOLUTION INTRAMUSCULAR; INTRAVENOUS at 12:03

## 2022-09-12 RX ADMIN — LEUCINE, PHENYLALANINE, LYSINE, METHIONINE, ISOLEUCINE, VALINE, HISTIDINE, THREONINE, TRYPTOPHAN, ALANINE, GLYCINE, ARGININE, PROLINE, SERINE, TYROSINE, DEXTROSE SCH MLS/HR: 311; 238; 247; 170; 255; 247; 204; 179; 77; 880; 438; 489; 289; 213; 17; 5 INJECTION INTRAVENOUS at 17:48

## 2022-09-12 RX ADMIN — INSULIN DETEMIR SCH UNITS: 100 INJECTION, SOLUTION SUBCUTANEOUS at 22:26

## 2022-09-12 RX ADMIN — FUROSEMIDE SCH MG: 10 INJECTION, SOLUTION INTRAVENOUS at 10:07

## 2022-09-12 RX ADMIN — SENNOSIDES SCH: 8.6 TABLET, FILM COATED ORAL at 22:27

## 2022-09-12 RX ADMIN — PROPOFOL SCH MLS/HR: 10 INJECTION, EMULSION INTRAVENOUS at 14:03

## 2022-09-12 RX ADMIN — PANTOPRAZOLE SODIUM SCH MG: 40 INJECTION, POWDER, FOR SOLUTION INTRAVENOUS at 10:08

## 2022-09-12 RX ADMIN — SENNOSIDES SCH TAB: 8.6 TABLET, FILM COATED ORAL at 10:08

## 2022-09-12 RX ADMIN — PROPOFOL SCH MLS/HR: 10 INJECTION, EMULSION INTRAVENOUS at 10:08

## 2022-09-12 RX ADMIN — LATANOPROST SCH DROP: 50 SOLUTION OPHTHALMIC at 22:27

## 2022-09-13 LAB
ALBUMIN SERPL-MCNC: 1.8 G/DL (ref 3.4–5)
ALP SERPL-CCNC: 84 U/L (ref 45–117)
ALT SERPL-CCNC: 13 U/L (ref 13–61)
ANION GAP SERPL CALC-SCNC: 11 MMOL/L (ref 8–16)
AST SERPL-CCNC: 15 U/L (ref 15–37)
BILIRUB SERPL-MCNC: 0.3 MG/DL (ref 0.2–1)
BUN SERPL-MCNC: 82.1 MG/DL (ref 7–18)
CALCIUM SERPL-MCNC: 7.7 MG/DL (ref 8.5–10.1)
CHLORIDE SERPL-SCNC: 102 MMOL/L (ref 98–107)
CO2 SERPL-SCNC: 24 MMOL/L (ref 21–32)
CREAT SERPL-MCNC: 4.1 MG/DL (ref 0.55–1.3)
DEPRECATED RDW RBC AUTO: 15.2 % (ref 11.9–15.9)
GLUCOSE SERPL-MCNC: 120 MG/DL (ref 74–106)
HCT VFR BLD CALC: 22.3 % (ref 35.4–49)
HGB BLD-MCNC: 7.3 GM/DL (ref 11.7–16.9)
MAGNESIUM SERPL-MCNC: 1.8 MG/DL (ref 1.8–2.4)
MCH RBC QN AUTO: 31.7 PG (ref 25.7–33.7)
MCHC RBC AUTO-ENTMCNC: 32.9 G/DL (ref 32–35.9)
MCV RBC: 96.2 FL (ref 80–96)
PHOSPHATE SERPL-MCNC: 6.9 MG/DL (ref 2.5–4.9)
PLATELET # BLD AUTO: 391 10^3/UL (ref 134–434)
PMV BLD: 8.4 FL (ref 7.5–11.1)
PROT SERPL-MCNC: 5.4 G/DL (ref 6.4–8.2)
RBC # BLD AUTO: 2.32 M/MM3 (ref 4–5.6)
SODIUM SERPL-SCNC: 137 MMOL/L (ref 136–145)
WBC # BLD AUTO: 7.2 K/MM3 (ref 4–10)

## 2022-09-13 RX ADMIN — FENTANYL CITRATE SCH MLS/HR: 0.05 INJECTION, SOLUTION INTRAMUSCULAR; INTRAVENOUS at 09:06

## 2022-09-13 RX ADMIN — PANTOPRAZOLE SODIUM SCH MG: 40 INJECTION, POWDER, FOR SOLUTION INTRAVENOUS at 09:06

## 2022-09-13 RX ADMIN — HEPARIN SODIUM SCH UNIT: 5000 INJECTION, SOLUTION INTRAVENOUS; SUBCUTANEOUS at 13:53

## 2022-09-13 RX ADMIN — LEUCINE, PHENYLALANINE, LYSINE, METHIONINE, ISOLEUCINE, VALINE, HISTIDINE, THREONINE, TRYPTOPHAN, ALANINE, GLYCINE, ARGININE, PROLINE, SERINE, TYROSINE, DEXTROSE SCH MLS/HR: 311; 238; 247; 170; 255; 247; 204; 179; 77; 880; 438; 489; 289; 213; 17; 5 INJECTION INTRAVENOUS at 17:29

## 2022-09-13 RX ADMIN — INSULIN ASPART SCH: 100 INJECTION, SOLUTION INTRAVENOUS; SUBCUTANEOUS at 15:30

## 2022-09-13 RX ADMIN — LATANOPROST SCH DROP: 50 SOLUTION OPHTHALMIC at 22:27

## 2022-09-13 RX ADMIN — PROPOFOL SCH: 10 INJECTION, EMULSION INTRAVENOUS at 10:50

## 2022-09-13 RX ADMIN — Medication SCH TABLET: at 09:05

## 2022-09-13 RX ADMIN — INSULIN DETEMIR SCH: 100 INJECTION, SOLUTION SUBCUTANEOUS at 22:26

## 2022-09-13 RX ADMIN — HEPARIN SODIUM SCH UNIT: 5000 INJECTION, SOLUTION INTRAVENOUS; SUBCUTANEOUS at 07:04

## 2022-09-13 RX ADMIN — HEPARIN SODIUM SCH UNIT: 5000 INJECTION, SOLUTION INTRAVENOUS; SUBCUTANEOUS at 22:26

## 2022-09-13 RX ADMIN — FENTANYL CITRATE SCH MLS/HR: 0.05 INJECTION, SOLUTION INTRAMUSCULAR; INTRAVENOUS at 00:15

## 2022-09-13 RX ADMIN — PROPOFOL SCH MLS/HR: 10 INJECTION, EMULSION INTRAVENOUS at 09:06

## 2022-09-13 RX ADMIN — INSULIN ASPART SCH: 100 INJECTION, SOLUTION INTRAVENOUS; SUBCUTANEOUS at 06:36

## 2022-09-13 RX ADMIN — PROPOFOL SCH MLS/HR: 10 INJECTION, EMULSION INTRAVENOUS at 13:53

## 2022-09-13 RX ADMIN — POLYETHYLENE GLYCOL 3350 SCH GM: 17 POWDER, FOR SOLUTION ORAL at 09:05

## 2022-09-13 RX ADMIN — INSULIN ASPART SCH: 100 INJECTION, SOLUTION INTRAVENOUS; SUBCUTANEOUS at 22:26

## 2022-09-13 RX ADMIN — INSULIN ASPART SCH: 100 INJECTION, SOLUTION INTRAVENOUS; SUBCUTANEOUS at 11:48

## 2022-09-13 RX ADMIN — FUROSEMIDE SCH MG: 10 INJECTION, SOLUTION INTRAVENOUS at 13:53

## 2022-09-13 RX ADMIN — FUROSEMIDE SCH MG: 10 INJECTION, SOLUTION INTRAVENOUS at 07:05

## 2022-09-13 RX ADMIN — INSULIN DETEMIR SCH UNITS: 100 INJECTION, SOLUTION SUBCUTANEOUS at 07:05

## 2022-09-13 RX ADMIN — SENNOSIDES SCH TAB: 8.6 TABLET, FILM COATED ORAL at 09:05

## 2022-09-14 LAB
ALBUMIN SERPL-MCNC: 1.9 G/DL (ref 3.4–5)
ALP SERPL-CCNC: 96 U/L (ref 45–117)
ALT SERPL-CCNC: 15 U/L (ref 13–61)
ANION GAP SERPL CALC-SCNC: 11 MMOL/L (ref 8–16)
AST SERPL-CCNC: 21 U/L (ref 15–37)
BILIRUB SERPL-MCNC: 0.4 MG/DL (ref 0.2–1)
BUN SERPL-MCNC: 84 MG/DL (ref 7–18)
CALCIUM SERPL-MCNC: 8.1 MG/DL (ref 8.5–10.1)
CHLORIDE SERPL-SCNC: 101 MMOL/L (ref 98–107)
CO2 SERPL-SCNC: 25 MMOL/L (ref 21–32)
CREAT SERPL-MCNC: 4.3 MG/DL (ref 0.55–1.3)
DEPRECATED RDW RBC AUTO: 15.2 % (ref 11.9–15.9)
GLUCOSE SERPL-MCNC: 139 MG/DL (ref 74–106)
HCT VFR BLD CALC: 25.5 % (ref 35.4–49)
HGB BLD-MCNC: 8.2 GM/DL (ref 11.7–16.9)
MAGNESIUM SERPL-MCNC: 1.8 MG/DL (ref 1.8–2.4)
MCH RBC QN AUTO: 30.8 PG (ref 25.7–33.7)
MCHC RBC AUTO-ENTMCNC: 32.3 G/DL (ref 32–35.9)
MCV RBC: 95.3 FL (ref 80–96)
PHOSPHATE SERPL-MCNC: 7.1 MG/DL (ref 2.5–4.9)
PLATELET # BLD AUTO: 401 10^3/UL (ref 134–434)
PMV BLD: 8.7 FL (ref 7.5–11.1)
PROT SERPL-MCNC: 6.1 G/DL (ref 6.4–8.2)
RBC # BLD AUTO: 2.68 M/MM3 (ref 4–5.6)
SODIUM SERPL-SCNC: 137 MMOL/L (ref 136–145)
WBC # BLD AUTO: 7.6 K/MM3 (ref 4–10)

## 2022-09-14 PROCEDURE — 0BJ08ZZ INSPECTION OF TRACHEOBRONCHIAL TREE, VIA NATURAL OR ARTIFICIAL OPENING ENDOSCOPIC: ICD-10-PCS | Performed by: INTERNAL MEDICINE

## 2022-09-14 PROCEDURE — 0B113F4 BYPASS TRACHEA TO CUTANEOUS WITH TRACHEOSTOMY DEVICE, PERCUTANEOUS APPROACH: ICD-10-PCS | Performed by: SURGERY

## 2022-09-14 RX ADMIN — INSULIN DETEMIR SCH UNITS: 100 INJECTION, SOLUTION SUBCUTANEOUS at 22:35

## 2022-09-14 RX ADMIN — PANTOPRAZOLE SODIUM SCH MG: 40 INJECTION, POWDER, FOR SOLUTION INTRAVENOUS at 10:09

## 2022-09-14 RX ADMIN — INSULIN ASPART SCH UNITS: 100 INJECTION, SOLUTION INTRAVENOUS; SUBCUTANEOUS at 17:43

## 2022-09-14 RX ADMIN — COLLAGENASE SANTYL SCH: 250 OINTMENT TOPICAL at 19:15

## 2022-09-14 RX ADMIN — FUROSEMIDE SCH MG: 10 INJECTION, SOLUTION INTRAVENOUS at 06:42

## 2022-09-14 RX ADMIN — METOCLOPRAMIDE SCH MG: 5 INJECTION, SOLUTION INTRAMUSCULAR; INTRAVENOUS at 14:43

## 2022-09-14 RX ADMIN — FENTANYL CITRATE SCH MLS/HR: 0.05 INJECTION, SOLUTION INTRAMUSCULAR; INTRAVENOUS at 00:20

## 2022-09-14 RX ADMIN — POLYETHYLENE GLYCOL 3350 SCH: 17 POWDER, FOR SOLUTION ORAL at 13:13

## 2022-09-14 RX ADMIN — PROPOFOL SCH MLS/HR: 10 INJECTION, EMULSION INTRAVENOUS at 10:45

## 2022-09-14 RX ADMIN — METOCLOPRAMIDE SCH MG: 5 INJECTION, SOLUTION INTRAMUSCULAR; INTRAVENOUS at 22:35

## 2022-09-14 RX ADMIN — METOCLOPRAMIDE SCH: 5 INJECTION, SOLUTION INTRAMUSCULAR; INTRAVENOUS at 13:12

## 2022-09-14 RX ADMIN — FUROSEMIDE SCH MG: 10 INJECTION, SOLUTION INTRAVENOUS at 13:49

## 2022-09-14 RX ADMIN — FENTANYL CITRATE SCH MLS/HR: 0.05 INJECTION, SOLUTION INTRAMUSCULAR; INTRAVENOUS at 10:18

## 2022-09-14 RX ADMIN — INSULIN ASPART SCH: 100 INJECTION, SOLUTION INTRAVENOUS; SUBCUTANEOUS at 13:14

## 2022-09-14 RX ADMIN — INSULIN ASPART SCH: 100 INJECTION, SOLUTION INTRAVENOUS; SUBCUTANEOUS at 06:44

## 2022-09-14 RX ADMIN — Medication SCH: at 13:13

## 2022-09-14 RX ADMIN — INSULIN ASPART SCH UNITS: 100 INJECTION, SOLUTION INTRAVENOUS; SUBCUTANEOUS at 22:35

## 2022-09-14 RX ADMIN — INSULIN DETEMIR SCH: 100 INJECTION, SOLUTION SUBCUTANEOUS at 06:44

## 2022-09-14 RX ADMIN — HEPARIN SODIUM SCH UNIT: 5000 INJECTION, SOLUTION INTRAVENOUS; SUBCUTANEOUS at 22:35

## 2022-09-14 RX ADMIN — LATANOPROST SCH DROP: 50 SOLUTION OPHTHALMIC at 22:35

## 2022-09-15 LAB
ALBUMIN SERPL-MCNC: 2 G/DL (ref 3.4–5)
ALP SERPL-CCNC: 96 U/L (ref 45–117)
ALT SERPL-CCNC: 15 U/L (ref 13–61)
ANION GAP SERPL CALC-SCNC: 12 MMOL/L (ref 8–16)
AST SERPL-CCNC: 20 U/L (ref 15–37)
BILIRUB SERPL-MCNC: 0.4 MG/DL (ref 0.2–1)
BUN SERPL-MCNC: 89.9 MG/DL (ref 7–18)
CALCIUM SERPL-MCNC: 8.2 MG/DL (ref 8.5–10.1)
CHLORIDE SERPL-SCNC: 100 MMOL/L (ref 98–107)
CO2 SERPL-SCNC: 24 MMOL/L (ref 21–32)
CREAT SERPL-MCNC: 4.3 MG/DL (ref 0.55–1.3)
DEPRECATED RDW RBC AUTO: 15.6 % (ref 11.9–15.9)
GLUCOSE SERPL-MCNC: 129 MG/DL (ref 74–106)
HCT VFR BLD CALC: 26 % (ref 35.4–49)
HGB BLD-MCNC: 8.4 GM/DL (ref 11.7–16.9)
MAGNESIUM SERPL-MCNC: 1.6 MG/DL (ref 1.8–2.4)
MCH RBC QN AUTO: 31.3 PG (ref 25.7–33.7)
MCHC RBC AUTO-ENTMCNC: 32.5 G/DL (ref 32–35.9)
MCV RBC: 96.3 FL (ref 80–96)
PHOSPHATE SERPL-MCNC: 6.2 MG/DL (ref 2.5–4.9)
PLATELET # BLD AUTO: 174 10^3/UL (ref 134–434)
PMV BLD: 8.6 FL (ref 7.5–11.1)
PROT SERPL-MCNC: 6.5 G/DL (ref 6.4–8.2)
RBC # BLD AUTO: 2.7 M/MM3 (ref 4–5.6)
SODIUM SERPL-SCNC: 137 MMOL/L (ref 136–145)
WBC # BLD AUTO: 8.7 K/MM3 (ref 4–10)

## 2022-09-15 RX ADMIN — METOCLOPRAMIDE SCH MG: 5 INJECTION, SOLUTION INTRAMUSCULAR; INTRAVENOUS at 21:38

## 2022-09-15 RX ADMIN — INSULIN DETEMIR SCH UNITS: 100 INJECTION, SOLUTION SUBCUTANEOUS at 21:39

## 2022-09-15 RX ADMIN — HEPARIN SODIUM SCH UNIT: 5000 INJECTION, SOLUTION INTRAVENOUS; SUBCUTANEOUS at 14:19

## 2022-09-15 RX ADMIN — PANTOPRAZOLE SODIUM SCH MG: 40 INJECTION, POWDER, FOR SOLUTION INTRAVENOUS at 09:29

## 2022-09-15 RX ADMIN — INSULIN DETEMIR SCH: 100 INJECTION, SOLUTION SUBCUTANEOUS at 07:44

## 2022-09-15 RX ADMIN — METOCLOPRAMIDE SCH MG: 5 INJECTION, SOLUTION INTRAMUSCULAR; INTRAVENOUS at 09:29

## 2022-09-15 RX ADMIN — COLLAGENASE SANTYL SCH APPLIC: 250 OINTMENT TOPICAL at 09:29

## 2022-09-15 RX ADMIN — LEUCINE, PHENYLALANINE, LYSINE, METHIONINE, ISOLEUCINE, VALINE, HISTIDINE, THREONINE, TRYPTOPHAN, ALANINE, GLYCINE, ARGININE, PROLINE, SERINE, TYROSINE, DEXTROSE SCH MLS/HR: 311; 238; 247; 170; 255; 247; 204; 179; 77; 880; 438; 489; 289; 213; 17; 5 INJECTION INTRAVENOUS at 00:30

## 2022-09-15 RX ADMIN — INSULIN ASPART SCH: 100 INJECTION, SOLUTION INTRAVENOUS; SUBCUTANEOUS at 10:30

## 2022-09-15 RX ADMIN — METOCLOPRAMIDE SCH MG: 5 INJECTION, SOLUTION INTRAMUSCULAR; INTRAVENOUS at 03:50

## 2022-09-15 RX ADMIN — INSULIN ASPART SCH UNITS: 100 INJECTION, SOLUTION INTRAVENOUS; SUBCUTANEOUS at 21:41

## 2022-09-15 RX ADMIN — LATANOPROST SCH DROP: 50 SOLUTION OPHTHALMIC at 21:39

## 2022-09-15 RX ADMIN — INSULIN ASPART SCH: 100 INJECTION, SOLUTION INTRAVENOUS; SUBCUTANEOUS at 07:44

## 2022-09-15 RX ADMIN — METOCLOPRAMIDE SCH MG: 5 INJECTION, SOLUTION INTRAMUSCULAR; INTRAVENOUS at 14:20

## 2022-09-15 RX ADMIN — HEPARIN SODIUM SCH UNIT: 5000 INJECTION, SOLUTION INTRAVENOUS; SUBCUTANEOUS at 07:43

## 2022-09-15 RX ADMIN — PROPOFOL SCH: 10 INJECTION, EMULSION INTRAVENOUS at 18:14

## 2022-09-15 RX ADMIN — POLYETHYLENE GLYCOL 3350 SCH: 17 POWDER, FOR SOLUTION ORAL at 09:30

## 2022-09-15 RX ADMIN — HEPARIN SODIUM SCH UNIT: 5000 INJECTION, SOLUTION INTRAVENOUS; SUBCUTANEOUS at 21:38

## 2022-09-15 RX ADMIN — Medication SCH: at 09:30

## 2022-09-15 RX ADMIN — INSULIN ASPART SCH UNITS: 100 INJECTION, SOLUTION INTRAVENOUS; SUBCUTANEOUS at 17:02

## 2022-09-15 RX ADMIN — LEUCINE, PHENYLALANINE, LYSINE, METHIONINE, ISOLEUCINE, VALINE, HISTIDINE, THREONINE, TRYPTOPHAN, ALANINE, GLYCINE, ARGININE, PROLINE, SERINE, TYROSINE, DEXTROSE SCH MLS/HR: 311; 238; 247; 170; 255; 247; 204; 179; 77; 880; 438; 489; 289; 213; 17; 5 INJECTION INTRAVENOUS at 21:49

## 2022-09-16 LAB
ALBUMIN SERPL-MCNC: 2 G/DL (ref 3.4–5)
ALP SERPL-CCNC: 88 U/L (ref 45–117)
ALT SERPL-CCNC: 13 U/L (ref 13–61)
ANION GAP SERPL CALC-SCNC: 11 MMOL/L (ref 8–16)
AST SERPL-CCNC: 20 U/L (ref 15–37)
BILIRUB SERPL-MCNC: 0.4 MG/DL (ref 0.2–1)
BUN SERPL-MCNC: 92.3 MG/DL (ref 7–18)
CALCIUM SERPL-MCNC: 8.4 MG/DL (ref 8.5–10.1)
CHLORIDE SERPL-SCNC: 102 MMOL/L (ref 98–107)
CO2 SERPL-SCNC: 26 MMOL/L (ref 21–32)
CREAT SERPL-MCNC: 4.4 MG/DL (ref 0.55–1.3)
DEPRECATED RDW RBC AUTO: 15.2 % (ref 11.9–15.9)
GLUCOSE SERPL-MCNC: 197 MG/DL (ref 74–106)
HCT VFR BLD CALC: 23.8 % (ref 35.4–49)
HGB BLD-MCNC: 7.8 GM/DL (ref 11.7–16.9)
MAGNESIUM SERPL-MCNC: 2.1 MG/DL (ref 1.8–2.4)
MCH RBC QN AUTO: 31 PG (ref 25.7–33.7)
MCHC RBC AUTO-ENTMCNC: 32.8 G/DL (ref 32–35.9)
MCV RBC: 94.7 FL (ref 80–96)
PHOSPHATE SERPL-MCNC: 5.3 MG/DL (ref 2.5–4.9)
PLATELET # BLD AUTO: 342 10^3/UL (ref 134–434)
PMV BLD: 8.5 FL (ref 7.5–11.1)
PROT SERPL-MCNC: 6.3 G/DL (ref 6.4–8.2)
RBC # BLD AUTO: 2.52 M/MM3 (ref 4–5.6)
SODIUM SERPL-SCNC: 139 MMOL/L (ref 136–145)
WBC # BLD AUTO: 8.6 K/MM3 (ref 4–10)

## 2022-09-16 RX ADMIN — METOCLOPRAMIDE SCH MG: 5 INJECTION, SOLUTION INTRAMUSCULAR; INTRAVENOUS at 22:15

## 2022-09-16 RX ADMIN — POLYETHYLENE GLYCOL 3350 SCH GM: 17 POWDER, FOR SOLUTION ORAL at 09:44

## 2022-09-16 RX ADMIN — HEPARIN SODIUM SCH UNIT: 5000 INJECTION, SOLUTION INTRAVENOUS; SUBCUTANEOUS at 18:41

## 2022-09-16 RX ADMIN — INSULIN ASPART SCH UNIT: 100 INJECTION, SOLUTION INTRAVENOUS; SUBCUTANEOUS at 22:17

## 2022-09-16 RX ADMIN — INSULIN DETEMIR SCH UNITS: 100 INJECTION, SOLUTION SUBCUTANEOUS at 06:42

## 2022-09-16 RX ADMIN — METOCLOPRAMIDE SCH MG: 5 INJECTION, SOLUTION INTRAMUSCULAR; INTRAVENOUS at 18:41

## 2022-09-16 RX ADMIN — HEPARIN SODIUM SCH UNIT: 5000 INJECTION, SOLUTION INTRAVENOUS; SUBCUTANEOUS at 22:16

## 2022-09-16 RX ADMIN — INSULIN ASPART SCH: 100 INJECTION, SOLUTION INTRAVENOUS; SUBCUTANEOUS at 06:43

## 2022-09-16 RX ADMIN — LATANOPROST SCH DROP: 50 SOLUTION OPHTHALMIC at 22:20

## 2022-09-16 RX ADMIN — METOCLOPRAMIDE SCH MG: 5 INJECTION, SOLUTION INTRAMUSCULAR; INTRAVENOUS at 09:43

## 2022-09-16 RX ADMIN — FUROSEMIDE SCH: 10 INJECTION, SOLUTION INTRAVENOUS at 20:16

## 2022-09-16 RX ADMIN — INSULIN ASPART SCH: 100 INJECTION, SOLUTION INTRAVENOUS; SUBCUTANEOUS at 18:21

## 2022-09-16 RX ADMIN — Medication SCH TABLET: at 09:44

## 2022-09-16 RX ADMIN — FUROSEMIDE SCH MG: 10 INJECTION, SOLUTION INTRAVENOUS at 06:42

## 2022-09-16 RX ADMIN — METOCLOPRAMIDE SCH MG: 5 INJECTION, SOLUTION INTRAMUSCULAR; INTRAVENOUS at 03:40

## 2022-09-16 RX ADMIN — HEPARIN SODIUM SCH UNIT: 5000 INJECTION, SOLUTION INTRAVENOUS; SUBCUTANEOUS at 06:42

## 2022-09-16 RX ADMIN — INSULIN DETEMIR SCH UNITS: 100 INJECTION, SOLUTION SUBCUTANEOUS at 22:17

## 2022-09-16 RX ADMIN — INSULIN ASPART SCH UNIT: 100 INJECTION, SOLUTION INTRAVENOUS; SUBCUTANEOUS at 12:19

## 2022-09-16 RX ADMIN — Medication SCH ML: at 09:43

## 2022-09-16 RX ADMIN — COLLAGENASE SANTYL SCH APPLIC: 250 OINTMENT TOPICAL at 09:45

## 2022-09-17 LAB
ALBUMIN SERPL-MCNC: 2.1 G/DL (ref 3.4–5)
ALP SERPL-CCNC: 83 U/L (ref 45–117)
ALT SERPL-CCNC: 12 U/L (ref 13–61)
ANION GAP SERPL CALC-SCNC: 8 MMOL/L (ref 8–16)
AST SERPL-CCNC: 13 U/L (ref 15–37)
BASOPHILS # BLD: 1 % (ref 0–2)
BILIRUB SERPL-MCNC: 0.6 MG/DL (ref 0.2–1)
BUN SERPL-MCNC: 98.1 MG/DL (ref 7–18)
CALCIUM SERPL-MCNC: 8.6 MG/DL (ref 8.5–10.1)
CHLORIDE SERPL-SCNC: 103 MMOL/L (ref 98–107)
CO2 SERPL-SCNC: 27 MMOL/L (ref 21–32)
CREAT SERPL-MCNC: 4.2 MG/DL (ref 0.55–1.3)
DEPRECATED RDW RBC AUTO: 14.9 % (ref 11.9–15.9)
EOSINOPHIL # BLD: 3.6 % (ref 0–4.5)
GLUCOSE SERPL-MCNC: 206 MG/DL (ref 74–106)
HCT VFR BLD CALC: 23.6 % (ref 35.4–49)
HGB BLD-MCNC: 7.9 GM/DL (ref 11.7–16.9)
LYMPHOCYTES # BLD: 13 % (ref 8–40)
MCH RBC QN AUTO: 31.7 PG (ref 25.7–33.7)
MCHC RBC AUTO-ENTMCNC: 33.5 G/DL (ref 32–35.9)
MCV RBC: 94.5 FL (ref 80–96)
MONOCYTES # BLD AUTO: 9.6 % (ref 3.8–10.2)
NEUTROPHILS # BLD: 72.8 % (ref 42.8–82.8)
PLATELET # BLD AUTO: 283 10^3/UL (ref 134–434)
PMV BLD: 8 FL (ref 7.5–11.1)
PROT SERPL-MCNC: 6.7 G/DL (ref 6.4–8.2)
RBC # BLD AUTO: 2.49 M/MM3 (ref 4–5.6)
SODIUM SERPL-SCNC: 138 MMOL/L (ref 136–145)
WBC # BLD AUTO: 8.8 K/MM3 (ref 4–10)

## 2022-09-17 RX ADMIN — INSULIN DETEMIR SCH UNITS: 100 INJECTION, SOLUTION SUBCUTANEOUS at 21:49

## 2022-09-17 RX ADMIN — COLLAGENASE SANTYL SCH APPLIC: 250 OINTMENT TOPICAL at 11:00

## 2022-09-17 RX ADMIN — Medication SCH ML: at 08:40

## 2022-09-17 RX ADMIN — INSULIN ASPART SCH: 100 INJECTION, SOLUTION INTRAVENOUS; SUBCUTANEOUS at 16:58

## 2022-09-17 RX ADMIN — LATANOPROST SCH DROP: 50 SOLUTION OPHTHALMIC at 21:51

## 2022-09-17 RX ADMIN — PANTOPRAZOLE SODIUM SCH: 20 TABLET, DELAYED RELEASE ORAL at 10:59

## 2022-09-17 RX ADMIN — Medication SCH TABLET: at 10:58

## 2022-09-17 RX ADMIN — METOCLOPRAMIDE SCH: 5 INJECTION, SOLUTION INTRAMUSCULAR; INTRAVENOUS at 03:18

## 2022-09-17 RX ADMIN — POLYETHYLENE GLYCOL 3350 SCH GM: 17 POWDER, FOR SOLUTION ORAL at 10:58

## 2022-09-17 RX ADMIN — FAMOTIDINE SCH MG: 40 POWDER, FOR SUSPENSION ORAL at 21:50

## 2022-09-17 RX ADMIN — HEPARIN SODIUM SCH UNIT: 5000 INJECTION, SOLUTION INTRAVENOUS; SUBCUTANEOUS at 07:11

## 2022-09-17 RX ADMIN — FAMOTIDINE SCH MG: 40 POWDER, FOR SUSPENSION ORAL at 16:04

## 2022-09-17 RX ADMIN — FUROSEMIDE SCH MG: 40 TABLET ORAL at 10:58

## 2022-09-17 RX ADMIN — METOCLOPRAMIDE SCH: 5 INJECTION, SOLUTION INTRAMUSCULAR; INTRAVENOUS at 10:10

## 2022-09-17 RX ADMIN — INSULIN ASPART SCH UNIT: 100 INJECTION, SOLUTION INTRAVENOUS; SUBCUTANEOUS at 07:12

## 2022-09-17 RX ADMIN — PANTOPRAZOLE SODIUM SCH: 20 TABLET, DELAYED RELEASE ORAL at 21:50

## 2022-09-17 RX ADMIN — INSULIN ASPART SCH UNIT: 100 INJECTION, SOLUTION INTRAVENOUS; SUBCUTANEOUS at 11:13

## 2022-09-17 RX ADMIN — INSULIN DETEMIR SCH UNITS: 100 INJECTION, SOLUTION SUBCUTANEOUS at 07:12

## 2022-09-17 RX ADMIN — INSULIN ASPART SCH UNIT: 100 INJECTION, SOLUTION INTRAVENOUS; SUBCUTANEOUS at 21:48

## 2022-09-18 LAB
ALBUMIN SERPL-MCNC: 2.2 G/DL (ref 3.4–5)
ALP SERPL-CCNC: 83 U/L (ref 45–117)
ALT SERPL-CCNC: 13 U/L (ref 13–61)
ANION GAP SERPL CALC-SCNC: 10 MMOL/L (ref 8–16)
ANISOCYTOSIS BLD QL: 0
AST SERPL-CCNC: 14 U/L (ref 15–37)
BASO STIPL BLD QL SMEAR: 0
BILIRUB SERPL-MCNC: 0.4 MG/DL (ref 0.2–1)
BUN SERPL-MCNC: 101.8 MG/DL (ref 7–18)
CALCIUM SERPL-MCNC: 8.8 MG/DL (ref 8.5–10.1)
CHLORIDE SERPL-SCNC: 104 MMOL/L (ref 98–107)
CO2 SERPL-SCNC: 28 MMOL/L (ref 21–32)
CREAT SERPL-MCNC: 4.1 MG/DL (ref 0.55–1.3)
DACRYOCYTES BLD QL SMEAR: 0
DEPRECATED RDW RBC AUTO: 15.2 % (ref 11.9–15.9)
DOHLE BOD BLD QL SMEAR: 0
GLUCOSE SERPL-MCNC: 152 MG/DL (ref 74–106)
HCT VFR BLD CALC: 25.8 % (ref 35.4–49)
HELMET CELLS BLD QL SMEAR: 0
HGB BLD-MCNC: 8.4 GM/DL (ref 11.7–16.9)
HOWELL-JOLLY BOD BLD QL SMEAR: 0
MACROCYTES BLD QL: 0
MCH RBC QN AUTO: 30.7 PG (ref 25.7–33.7)
MCHC RBC AUTO-ENTMCNC: 32.6 G/DL (ref 32–35.9)
MCV RBC: 94.1 FL (ref 80–96)
OVALOCYTES BLD QL SMEAR: 0
PLATELET # BLD AUTO: 282 10^3/UL (ref 134–434)
PMV BLD: 8.4 FL (ref 7.5–11.1)
PROT SERPL-MCNC: 7 G/DL (ref 6.4–8.2)
RBC # BLD AUTO: 2.74 M/MM3 (ref 4–5.6)
ROULEAUX BLD QL SMEAR: 0
SICKLE CELLS BLD QL SMEAR: 0
SODIUM SERPL-SCNC: 142 MMOL/L (ref 136–145)
TARGETS BLD QL SMEAR: 0
TOXIC GRANULES BLD QL SMEAR: 0
WBC # BLD AUTO: 6.4 K/MM3 (ref 4–10)

## 2022-09-18 RX ADMIN — Medication SCH TABLET: at 09:10

## 2022-09-18 RX ADMIN — POTASSIUM CHLORIDE SCH MEQ: 20 SOLUTION ORAL at 09:10

## 2022-09-18 RX ADMIN — POLYETHYLENE GLYCOL 3350 SCH: 17 POWDER, FOR SOLUTION ORAL at 09:07

## 2022-09-18 RX ADMIN — INSULIN ASPART SCH UNIT: 100 INJECTION, SOLUTION INTRAVENOUS; SUBCUTANEOUS at 06:09

## 2022-09-18 RX ADMIN — FUROSEMIDE SCH MG: 40 TABLET ORAL at 09:10

## 2022-09-18 RX ADMIN — PANTOPRAZOLE SODIUM SCH: 20 TABLET, DELAYED RELEASE ORAL at 09:11

## 2022-09-18 RX ADMIN — INSULIN DETEMIR SCH UNITS: 100 INJECTION, SOLUTION SUBCUTANEOUS at 06:10

## 2022-09-18 RX ADMIN — INSULIN ASPART SCH UNIT: 100 INJECTION, SOLUTION INTRAVENOUS; SUBCUTANEOUS at 16:04

## 2022-09-18 RX ADMIN — PANTOPRAZOLE SODIUM SCH: 20 TABLET, DELAYED RELEASE ORAL at 21:37

## 2022-09-18 RX ADMIN — INSULIN ASPART SCH UNIT: 100 INJECTION, SOLUTION INTRAVENOUS; SUBCUTANEOUS at 12:27

## 2022-09-18 RX ADMIN — Medication SCH ML: at 09:11

## 2022-09-18 RX ADMIN — COLLAGENASE SANTYL SCH APPLIC: 250 OINTMENT TOPICAL at 11:36

## 2022-09-18 RX ADMIN — INSULIN DETEMIR SCH UNITS: 100 INJECTION, SOLUTION SUBCUTANEOUS at 21:34

## 2022-09-18 RX ADMIN — FAMOTIDINE SCH MG: 40 POWDER, FOR SUSPENSION ORAL at 09:08

## 2022-09-18 RX ADMIN — FAMOTIDINE SCH MG: 40 POWDER, FOR SUSPENSION ORAL at 21:38

## 2022-09-18 RX ADMIN — LATANOPROST SCH DROP: 50 SOLUTION OPHTHALMIC at 21:38

## 2022-09-18 RX ADMIN — INSULIN ASPART SCH UNIT: 100 INJECTION, SOLUTION INTRAVENOUS; SUBCUTANEOUS at 21:35

## 2022-09-19 VITALS — DIASTOLIC BLOOD PRESSURE: 79 MMHG | RESPIRATION RATE: 21 BRPM | SYSTOLIC BLOOD PRESSURE: 170 MMHG | TEMPERATURE: 97.8 F

## 2022-09-19 VITALS — HEART RATE: 67 BPM

## 2022-09-19 LAB
ALBUMIN SERPL-MCNC: 2.4 G/DL (ref 3.4–5)
ALP SERPL-CCNC: 86 U/L (ref 45–117)
ALT SERPL-CCNC: 17 U/L (ref 13–61)
ANION GAP SERPL CALC-SCNC: 9 MMOL/L (ref 8–16)
ANISOCYTOSIS BLD QL: 0
AST SERPL-CCNC: 14 U/L (ref 15–37)
BILIRUB SERPL-MCNC: 0.4 MG/DL (ref 0.2–1)
BUN SERPL-MCNC: 106 MG/DL (ref 7–18)
CALCIUM SERPL-MCNC: 9.1 MG/DL (ref 8.5–10.1)
CHLORIDE SERPL-SCNC: 103 MMOL/L (ref 98–107)
CO2 SERPL-SCNC: 28 MMOL/L (ref 21–32)
CREAT SERPL-MCNC: 4.2 MG/DL (ref 0.55–1.3)
DEPRECATED RDW RBC AUTO: 15.3 % (ref 11.9–15.9)
GLUCOSE SERPL-MCNC: 271 MG/DL (ref 74–106)
HCT VFR BLD CALC: 26 % (ref 35.4–49)
HGB BLD-MCNC: 8.3 GM/DL (ref 11.7–16.9)
INR BLD: 1.04 (ref 0.83–1.09)
MACROCYTES BLD QL: (no result)
MAGNESIUM SERPL-MCNC: 2.5 MG/DL (ref 1.8–2.4)
MCH RBC QN AUTO: 30.6 PG (ref 25.7–33.7)
MCHC RBC AUTO-ENTMCNC: 32.1 G/DL (ref 32–35.9)
MCV RBC: 95.3 FL (ref 80–96)
PLATELET # BLD AUTO: 278 10^3/UL (ref 134–434)
PMV BLD: 8.4 FL (ref 7.5–11.1)
PROT SERPL-MCNC: 7.2 G/DL (ref 6.4–8.2)
PT PNL PPP: 12 SEC (ref 9.7–13)
RBC # BLD AUTO: 2.73 M/MM3 (ref 4–5.6)
SODIUM SERPL-SCNC: 140 MMOL/L (ref 136–145)
WBC # BLD AUTO: 8 K/MM3 (ref 4–10)

## 2022-09-19 RX ADMIN — POTASSIUM CHLORIDE SCH MEQ: 20 SOLUTION ORAL at 11:16

## 2022-09-19 RX ADMIN — COLLAGENASE SANTYL SCH APPLIC: 250 OINTMENT TOPICAL at 11:17

## 2022-09-19 RX ADMIN — INSULIN ASPART SCH UNIT: 100 INJECTION, SOLUTION INTRAVENOUS; SUBCUTANEOUS at 11:30

## 2022-09-19 RX ADMIN — POLYETHYLENE GLYCOL 3350 SCH: 17 POWDER, FOR SOLUTION ORAL at 11:16

## 2022-09-19 RX ADMIN — INSULIN ASPART SCH UNIT: 100 INJECTION, SOLUTION INTRAVENOUS; SUBCUTANEOUS at 18:58

## 2022-09-19 RX ADMIN — Medication SCH TABLET: at 11:16

## 2022-09-19 RX ADMIN — INSULIN DETEMIR SCH UNITS: 100 INJECTION, SOLUTION SUBCUTANEOUS at 06:42

## 2022-09-19 RX ADMIN — FAMOTIDINE SCH MG: 40 POWDER, FOR SUSPENSION ORAL at 14:30

## 2022-09-19 RX ADMIN — FUROSEMIDE SCH MG: 40 TABLET ORAL at 11:16

## 2022-09-19 RX ADMIN — INSULIN ASPART SCH UNIT: 100 INJECTION, SOLUTION INTRAVENOUS; SUBCUTANEOUS at 06:37

## 2022-09-19 RX ADMIN — Medication SCH ML: at 11:16
